# Patient Record
Sex: FEMALE | Race: WHITE | NOT HISPANIC OR LATINO | Employment: OTHER | URBAN - METROPOLITAN AREA
[De-identification: names, ages, dates, MRNs, and addresses within clinical notes are randomized per-mention and may not be internally consistent; named-entity substitution may affect disease eponyms.]

---

## 2017-01-03 ENCOUNTER — APPOINTMENT (OUTPATIENT)
Dept: PHYSICAL THERAPY | Facility: CLINIC | Age: 77
End: 2017-01-03
Payer: MEDICARE

## 2017-01-05 ENCOUNTER — APPOINTMENT (OUTPATIENT)
Dept: PHYSICAL THERAPY | Facility: CLINIC | Age: 77
End: 2017-01-05
Payer: MEDICARE

## 2017-01-10 ENCOUNTER — APPOINTMENT (OUTPATIENT)
Dept: PHYSICAL THERAPY | Facility: CLINIC | Age: 77
End: 2017-01-10
Payer: MEDICARE

## 2017-01-12 ENCOUNTER — APPOINTMENT (OUTPATIENT)
Dept: PHYSICAL THERAPY | Facility: CLINIC | Age: 77
End: 2017-01-12
Payer: MEDICARE

## 2017-01-12 PROCEDURE — 97110 THERAPEUTIC EXERCISES: CPT

## 2017-01-12 PROCEDURE — 97535 SELF CARE MNGMENT TRAINING: CPT

## 2017-01-12 PROCEDURE — 97140 MANUAL THERAPY 1/> REGIONS: CPT

## 2017-01-17 ENCOUNTER — APPOINTMENT (OUTPATIENT)
Dept: PHYSICAL THERAPY | Facility: CLINIC | Age: 77
End: 2017-01-17
Payer: MEDICARE

## 2017-01-17 PROCEDURE — 97014 ELECTRIC STIMULATION THERAPY: CPT

## 2017-01-17 PROCEDURE — 97140 MANUAL THERAPY 1/> REGIONS: CPT

## 2017-01-17 PROCEDURE — 97110 THERAPEUTIC EXERCISES: CPT

## 2017-01-19 ENCOUNTER — APPOINTMENT (OUTPATIENT)
Dept: PHYSICAL THERAPY | Facility: CLINIC | Age: 77
End: 2017-01-19
Payer: MEDICARE

## 2017-01-19 PROCEDURE — 97164 PT RE-EVAL EST PLAN CARE: CPT

## 2017-01-19 PROCEDURE — G8990 OTHER PT/OT CURRENT STATUS: HCPCS

## 2017-01-19 PROCEDURE — G8991 OTHER PT/OT GOAL STATUS: HCPCS

## 2017-01-19 PROCEDURE — 97140 MANUAL THERAPY 1/> REGIONS: CPT

## 2017-01-19 PROCEDURE — 97110 THERAPEUTIC EXERCISES: CPT

## 2017-01-19 PROCEDURE — 97014 ELECTRIC STIMULATION THERAPY: CPT

## 2017-01-24 ENCOUNTER — APPOINTMENT (OUTPATIENT)
Dept: PHYSICAL THERAPY | Facility: CLINIC | Age: 77
End: 2017-01-24
Payer: MEDICARE

## 2017-01-24 PROCEDURE — 97110 THERAPEUTIC EXERCISES: CPT

## 2017-01-24 PROCEDURE — 97140 MANUAL THERAPY 1/> REGIONS: CPT

## 2017-01-26 ENCOUNTER — APPOINTMENT (OUTPATIENT)
Dept: PHYSICAL THERAPY | Facility: CLINIC | Age: 77
End: 2017-01-26
Payer: MEDICARE

## 2017-01-26 PROCEDURE — 97140 MANUAL THERAPY 1/> REGIONS: CPT

## 2017-01-26 PROCEDURE — 97110 THERAPEUTIC EXERCISES: CPT

## 2017-01-31 ENCOUNTER — APPOINTMENT (OUTPATIENT)
Dept: PHYSICAL THERAPY | Facility: CLINIC | Age: 77
End: 2017-01-31
Payer: MEDICARE

## 2017-01-31 PROCEDURE — 97110 THERAPEUTIC EXERCISES: CPT

## 2017-01-31 PROCEDURE — 97140 MANUAL THERAPY 1/> REGIONS: CPT

## 2017-02-02 ENCOUNTER — APPOINTMENT (OUTPATIENT)
Dept: PHYSICAL THERAPY | Facility: CLINIC | Age: 77
End: 2017-02-02
Payer: MEDICARE

## 2017-02-02 PROCEDURE — 97110 THERAPEUTIC EXERCISES: CPT

## 2017-02-02 PROCEDURE — 97140 MANUAL THERAPY 1/> REGIONS: CPT

## 2017-02-02 PROCEDURE — 97014 ELECTRIC STIMULATION THERAPY: CPT

## 2017-02-07 ENCOUNTER — APPOINTMENT (OUTPATIENT)
Dept: PHYSICAL THERAPY | Facility: CLINIC | Age: 77
End: 2017-02-07
Payer: MEDICARE

## 2017-02-07 PROCEDURE — 97140 MANUAL THERAPY 1/> REGIONS: CPT

## 2017-02-07 PROCEDURE — 97014 ELECTRIC STIMULATION THERAPY: CPT

## 2017-02-07 PROCEDURE — 97110 THERAPEUTIC EXERCISES: CPT

## 2017-02-09 ENCOUNTER — APPOINTMENT (OUTPATIENT)
Dept: PHYSICAL THERAPY | Facility: CLINIC | Age: 77
End: 2017-02-09
Payer: MEDICARE

## 2017-02-10 ENCOUNTER — APPOINTMENT (OUTPATIENT)
Dept: PHYSICAL THERAPY | Facility: CLINIC | Age: 77
End: 2017-02-10
Payer: MEDICARE

## 2017-02-10 PROCEDURE — 97014 ELECTRIC STIMULATION THERAPY: CPT

## 2017-02-10 PROCEDURE — 97110 THERAPEUTIC EXERCISES: CPT

## 2017-02-10 PROCEDURE — 97140 MANUAL THERAPY 1/> REGIONS: CPT

## 2017-02-14 ENCOUNTER — APPOINTMENT (OUTPATIENT)
Dept: PHYSICAL THERAPY | Facility: CLINIC | Age: 77
End: 2017-02-14
Payer: MEDICARE

## 2017-02-14 PROCEDURE — 97110 THERAPEUTIC EXERCISES: CPT

## 2017-02-14 PROCEDURE — 97140 MANUAL THERAPY 1/> REGIONS: CPT

## 2017-02-16 ENCOUNTER — APPOINTMENT (OUTPATIENT)
Dept: PHYSICAL THERAPY | Facility: CLINIC | Age: 77
End: 2017-02-16
Payer: MEDICARE

## 2017-02-16 PROCEDURE — 97110 THERAPEUTIC EXERCISES: CPT

## 2017-02-16 PROCEDURE — 97140 MANUAL THERAPY 1/> REGIONS: CPT

## 2017-02-16 PROCEDURE — 97014 ELECTRIC STIMULATION THERAPY: CPT

## 2017-02-21 ENCOUNTER — APPOINTMENT (OUTPATIENT)
Dept: PHYSICAL THERAPY | Facility: CLINIC | Age: 77
End: 2017-02-21
Payer: MEDICARE

## 2017-02-23 ENCOUNTER — APPOINTMENT (OUTPATIENT)
Dept: PHYSICAL THERAPY | Facility: CLINIC | Age: 77
End: 2017-02-23
Payer: MEDICARE

## 2017-02-27 ENCOUNTER — APPOINTMENT (OUTPATIENT)
Dept: PHYSICAL THERAPY | Facility: CLINIC | Age: 77
End: 2017-02-27
Payer: MEDICARE

## 2017-02-27 PROCEDURE — G8991 OTHER PT/OT GOAL STATUS: HCPCS

## 2017-02-27 PROCEDURE — 97535 SELF CARE MNGMENT TRAINING: CPT

## 2017-02-27 PROCEDURE — 97110 THERAPEUTIC EXERCISES: CPT

## 2017-02-27 PROCEDURE — G8990 OTHER PT/OT CURRENT STATUS: HCPCS

## 2017-02-27 PROCEDURE — 97164 PT RE-EVAL EST PLAN CARE: CPT

## 2017-04-15 ENCOUNTER — HOSPITAL ENCOUNTER (OUTPATIENT)
Facility: HOSPITAL | Age: 77
Discharge: NURSING, REHAB OR LONG-TERM CARE | End: 2017-05-03
Attending: PHYSICAL MEDICINE & REHABILITATION | Admitting: PHYSICAL MEDICINE & REHABILITATION

## 2017-04-17 LAB
ALBUMIN SERPL BCP-MCNC: 2.1 G/DL (ref 3.5–5)
ALP SERPL-CCNC: 110 U/L (ref 46–116)
ALT SERPL W P-5'-P-CCNC: 25 U/L (ref 12–78)
ANION GAP SERPL CALCULATED.3IONS-SCNC: 9 MMOL/L (ref 4–13)
AST SERPL W P-5'-P-CCNC: 35 U/L (ref 5–45)
BASOPHILS # BLD AUTO: 0.04 THOUSANDS/ΜL (ref 0–0.1)
BASOPHILS NFR BLD AUTO: 1 % (ref 0–1)
BILIRUB SERPL-MCNC: 0.8 MG/DL (ref 0.2–1)
BUN SERPL-MCNC: 23 MG/DL (ref 5–25)
CALCIUM SERPL-MCNC: 8.2 MG/DL (ref 8.3–10.1)
CHLORIDE SERPL-SCNC: 108 MMOL/L (ref 100–108)
CO2 SERPL-SCNC: 23 MMOL/L (ref 21–32)
CREAT SERPL-MCNC: 1.35 MG/DL (ref 0.6–1.3)
EOSINOPHIL # BLD AUTO: 0.28 THOUSAND/ΜL (ref 0–0.61)
EOSINOPHIL NFR BLD AUTO: 4 % (ref 0–6)
ERYTHROCYTE [DISTWIDTH] IN BLOOD BY AUTOMATED COUNT: 15.5 % (ref 11.6–15.1)
GFR SERPL CREATININE-BSD FRML MDRD: 38.1 ML/MIN/1.73SQ M
GLUCOSE SERPL-MCNC: 105 MG/DL (ref 65–140)
HCT VFR BLD AUTO: 24.2 % (ref 34.8–46.1)
HGB BLD-MCNC: 7.9 G/DL (ref 11.5–15.4)
LYMPHOCYTES # BLD AUTO: 1.29 THOUSANDS/ΜL (ref 0.6–4.47)
LYMPHOCYTES NFR BLD AUTO: 18 % (ref 14–44)
MCH RBC QN AUTO: 31.9 PG (ref 26.8–34.3)
MCHC RBC AUTO-ENTMCNC: 32.6 G/DL (ref 31.4–37.4)
MCV RBC AUTO: 98 FL (ref 82–98)
MONOCYTES # BLD AUTO: 0.6 THOUSAND/ΜL (ref 0.17–1.22)
MONOCYTES NFR BLD AUTO: 8 % (ref 4–12)
NEUTROPHILS # BLD AUTO: 4.95 THOUSANDS/ΜL (ref 1.85–7.62)
NEUTS SEG NFR BLD AUTO: 69 % (ref 43–75)
NRBC BLD AUTO-RTO: 0 /100 WBCS
PLATELET # BLD AUTO: 307 THOUSANDS/UL (ref 149–390)
PMV BLD AUTO: 10.4 FL (ref 8.9–12.7)
POTASSIUM SERPL-SCNC: 3.9 MMOL/L (ref 3.5–5.3)
PREALB SERPL-MCNC: 9.4 MG/DL (ref 18–40)
PROT SERPL-MCNC: 5.3 G/DL (ref 6.4–8.2)
RBC # BLD AUTO: 2.48 MILLION/UL (ref 3.81–5.12)
SODIUM SERPL-SCNC: 140 MMOL/L (ref 136–145)
WBC # BLD AUTO: 7.22 THOUSAND/UL (ref 4.31–10.16)

## 2017-04-17 PROCEDURE — 80053 COMPREHEN METABOLIC PANEL: CPT | Performed by: PHYSICAL MEDICINE & REHABILITATION

## 2017-04-17 PROCEDURE — 85025 COMPLETE CBC W/AUTO DIFF WBC: CPT | Performed by: PHYSICAL MEDICINE & REHABILITATION

## 2017-04-17 PROCEDURE — 84134 ASSAY OF PREALBUMIN: CPT | Performed by: PHYSICAL MEDICINE & REHABILITATION

## 2017-04-18 LAB
BASOPHILS # BLD AUTO: 0.05 THOUSANDS/ΜL (ref 0–0.1)
BASOPHILS NFR BLD AUTO: 1 % (ref 0–1)
EOSINOPHIL # BLD AUTO: 0.34 THOUSAND/ΜL (ref 0–0.61)
EOSINOPHIL NFR BLD AUTO: 4 % (ref 0–6)
ERYTHROCYTE [DISTWIDTH] IN BLOOD BY AUTOMATED COUNT: 15.9 % (ref 11.6–15.1)
HCT VFR BLD AUTO: 27 % (ref 34.8–46.1)
HGB BLD-MCNC: 8.7 G/DL (ref 11.5–15.4)
LYMPHOCYTES # BLD AUTO: 2.09 THOUSANDS/ΜL (ref 0.6–4.47)
LYMPHOCYTES NFR BLD AUTO: 25 % (ref 14–44)
MCH RBC QN AUTO: 31.6 PG (ref 26.8–34.3)
MCHC RBC AUTO-ENTMCNC: 32.2 G/DL (ref 31.4–37.4)
MCV RBC AUTO: 98 FL (ref 82–98)
MONOCYTES # BLD AUTO: 0.56 THOUSAND/ΜL (ref 0.17–1.22)
MONOCYTES NFR BLD AUTO: 7 % (ref 4–12)
NEUTROPHILS # BLD AUTO: 5.25 THOUSANDS/ΜL (ref 1.85–7.62)
NEUTS SEG NFR BLD AUTO: 63 % (ref 43–75)
NRBC BLD AUTO-RTO: 0 /100 WBCS
PLATELET # BLD AUTO: 387 THOUSANDS/UL (ref 149–390)
PMV BLD AUTO: 10.1 FL (ref 8.9–12.7)
RBC # BLD AUTO: 2.75 MILLION/UL (ref 3.81–5.12)
WBC # BLD AUTO: 8.39 THOUSAND/UL (ref 4.31–10.16)

## 2017-04-18 PROCEDURE — 85025 COMPLETE CBC W/AUTO DIFF WBC: CPT | Performed by: NURSE PRACTITIONER

## 2017-04-21 LAB
ANION GAP SERPL CALCULATED.3IONS-SCNC: 9 MMOL/L (ref 4–13)
BASOPHILS # BLD AUTO: 0.04 THOUSANDS/ΜL (ref 0–0.1)
BASOPHILS NFR BLD AUTO: 1 % (ref 0–1)
BUN SERPL-MCNC: 26 MG/DL (ref 5–25)
CALCIUM SERPL-MCNC: 8.2 MG/DL (ref 8.3–10.1)
CHLORIDE SERPL-SCNC: 107 MMOL/L (ref 100–108)
CO2 SERPL-SCNC: 25 MMOL/L (ref 21–32)
CREAT SERPL-MCNC: 1.3 MG/DL (ref 0.6–1.3)
EOSINOPHIL # BLD AUTO: 0.31 THOUSAND/ΜL (ref 0–0.61)
EOSINOPHIL NFR BLD AUTO: 4 % (ref 0–6)
ERYTHROCYTE [DISTWIDTH] IN BLOOD BY AUTOMATED COUNT: 16.4 % (ref 11.6–15.1)
GFR SERPL CREATININE-BSD FRML MDRD: 39.8 ML/MIN/1.73SQ M
GLUCOSE SERPL-MCNC: 98 MG/DL (ref 65–140)
HCT VFR BLD AUTO: 25.4 % (ref 34.8–46.1)
HEMOCCULT STL QL: NEGATIVE
HGB BLD-MCNC: 8 G/DL (ref 11.5–15.4)
LYMPHOCYTES # BLD AUTO: 1.23 THOUSANDS/ΜL (ref 0.6–4.47)
LYMPHOCYTES NFR BLD AUTO: 17 % (ref 14–44)
MCH RBC QN AUTO: 31.3 PG (ref 26.8–34.3)
MCHC RBC AUTO-ENTMCNC: 31.5 G/DL (ref 31.4–37.4)
MCV RBC AUTO: 99 FL (ref 82–98)
MONOCYTES # BLD AUTO: 0.43 THOUSAND/ΜL (ref 0.17–1.22)
MONOCYTES NFR BLD AUTO: 6 % (ref 4–12)
NEUTROPHILS # BLD AUTO: 5.31 THOUSANDS/ΜL (ref 1.85–7.62)
NEUTS SEG NFR BLD AUTO: 72 % (ref 43–75)
NRBC BLD AUTO-RTO: 0 /100 WBCS
PLATELET # BLD AUTO: 468 THOUSANDS/UL (ref 149–390)
PMV BLD AUTO: 9.9 FL (ref 8.9–12.7)
POTASSIUM SERPL-SCNC: 3.9 MMOL/L (ref 3.5–5.3)
RBC # BLD AUTO: 2.56 MILLION/UL (ref 3.81–5.12)
SODIUM SERPL-SCNC: 141 MMOL/L (ref 136–145)
WBC # BLD AUTO: 7.39 THOUSAND/UL (ref 4.31–10.16)

## 2017-04-21 PROCEDURE — 82272 OCCULT BLD FECES 1-3 TESTS: CPT | Performed by: PHYSICAL MEDICINE & REHABILITATION

## 2017-04-21 PROCEDURE — 80048 BASIC METABOLIC PNL TOTAL CA: CPT | Performed by: PHYSICAL MEDICINE & REHABILITATION

## 2017-04-21 PROCEDURE — 85025 COMPLETE CBC W/AUTO DIFF WBC: CPT | Performed by: PHYSICAL MEDICINE & REHABILITATION

## 2017-04-23 LAB — HEMOCCULT STL QL: NEGATIVE

## 2017-04-23 PROCEDURE — 82272 OCCULT BLD FECES 1-3 TESTS: CPT | Performed by: PHYSICAL MEDICINE & REHABILITATION

## 2017-04-24 LAB
ANION GAP SERPL CALCULATED.3IONS-SCNC: 7 MMOL/L (ref 4–13)
BASOPHILS # BLD AUTO: 0.05 THOUSANDS/ΜL (ref 0–0.1)
BASOPHILS NFR BLD AUTO: 1 % (ref 0–1)
BUN SERPL-MCNC: 23 MG/DL (ref 5–25)
CALCIUM SERPL-MCNC: 8.4 MG/DL (ref 8.3–10.1)
CHLORIDE SERPL-SCNC: 108 MMOL/L (ref 100–108)
CO2 SERPL-SCNC: 27 MMOL/L (ref 21–32)
CREAT SERPL-MCNC: 1.32 MG/DL (ref 0.6–1.3)
EOSINOPHIL # BLD AUTO: 0.28 THOUSAND/ΜL (ref 0–0.61)
EOSINOPHIL NFR BLD AUTO: 5 % (ref 0–6)
ERYTHROCYTE [DISTWIDTH] IN BLOOD BY AUTOMATED COUNT: 16.5 % (ref 11.6–15.1)
GFR SERPL CREATININE-BSD FRML MDRD: 39.1 ML/MIN/1.73SQ M
GLUCOSE SERPL-MCNC: 91 MG/DL (ref 65–140)
HCT VFR BLD AUTO: 28 % (ref 34.8–46.1)
HGB BLD-MCNC: 8.8 G/DL (ref 11.5–15.4)
LYMPHOCYTES # BLD AUTO: 1.61 THOUSANDS/ΜL (ref 0.6–4.47)
LYMPHOCYTES NFR BLD AUTO: 27 % (ref 14–44)
MCH RBC QN AUTO: 31.9 PG (ref 26.8–34.3)
MCHC RBC AUTO-ENTMCNC: 31.4 G/DL (ref 31.4–37.4)
MCV RBC AUTO: 101 FL (ref 82–98)
MONOCYTES # BLD AUTO: 0.25 THOUSAND/ΜL (ref 0.17–1.22)
MONOCYTES NFR BLD AUTO: 4 % (ref 4–12)
NEUTROPHILS # BLD AUTO: 3.65 THOUSANDS/ΜL (ref 1.85–7.62)
NEUTS SEG NFR BLD AUTO: 62 % (ref 43–75)
NRBC BLD AUTO-RTO: 0 /100 WBCS
PLATELET # BLD AUTO: 552 THOUSANDS/UL (ref 149–390)
PMV BLD AUTO: 9.7 FL (ref 8.9–12.7)
POTASSIUM SERPL-SCNC: 4.2 MMOL/L (ref 3.5–5.3)
RBC # BLD AUTO: 2.76 MILLION/UL (ref 3.81–5.12)
SODIUM SERPL-SCNC: 142 MMOL/L (ref 136–145)
WBC # BLD AUTO: 5.87 THOUSAND/UL (ref 4.31–10.16)

## 2017-04-24 PROCEDURE — 80048 BASIC METABOLIC PNL TOTAL CA: CPT | Performed by: PHYSICAL MEDICINE & REHABILITATION

## 2017-04-24 PROCEDURE — 85025 COMPLETE CBC W/AUTO DIFF WBC: CPT | Performed by: PHYSICAL MEDICINE & REHABILITATION

## 2017-04-27 LAB
ANION GAP SERPL CALCULATED.3IONS-SCNC: 7 MMOL/L (ref 4–13)
BASOPHILS # BLD AUTO: 0.05 THOUSANDS/ΜL (ref 0–0.1)
BASOPHILS NFR BLD AUTO: 1 % (ref 0–1)
BUN SERPL-MCNC: 20 MG/DL (ref 5–25)
CALCIUM SERPL-MCNC: 8.2 MG/DL (ref 8.3–10.1)
CHLORIDE SERPL-SCNC: 107 MMOL/L (ref 100–108)
CO2 SERPL-SCNC: 28 MMOL/L (ref 21–32)
CREAT SERPL-MCNC: 1.3 MG/DL (ref 0.6–1.3)
EOSINOPHIL # BLD AUTO: 0.27 THOUSAND/ΜL (ref 0–0.61)
EOSINOPHIL NFR BLD AUTO: 5 % (ref 0–6)
ERYTHROCYTE [DISTWIDTH] IN BLOOD BY AUTOMATED COUNT: 16.2 % (ref 11.6–15.1)
GFR SERPL CREATININE-BSD FRML MDRD: 39.8 ML/MIN/1.73SQ M
GLUCOSE SERPL-MCNC: 89 MG/DL (ref 65–140)
HCT VFR BLD AUTO: 27.6 % (ref 34.8–46.1)
HGB BLD-MCNC: 8.6 G/DL (ref 11.5–15.4)
LYMPHOCYTES # BLD AUTO: 1.2 THOUSANDS/ΜL (ref 0.6–4.47)
LYMPHOCYTES NFR BLD AUTO: 22 % (ref 14–44)
MCH RBC QN AUTO: 31.4 PG (ref 26.8–34.3)
MCHC RBC AUTO-ENTMCNC: 31.2 G/DL (ref 31.4–37.4)
MCV RBC AUTO: 101 FL (ref 82–98)
MONOCYTES # BLD AUTO: 0.39 THOUSAND/ΜL (ref 0.17–1.22)
MONOCYTES NFR BLD AUTO: 7 % (ref 4–12)
NEUTROPHILS # BLD AUTO: 3.44 THOUSANDS/ΜL (ref 1.85–7.62)
NEUTS SEG NFR BLD AUTO: 64 % (ref 43–75)
NRBC BLD AUTO-RTO: 0 /100 WBCS
PLATELET # BLD AUTO: 486 THOUSANDS/UL (ref 149–390)
PMV BLD AUTO: 9.8 FL (ref 8.9–12.7)
POTASSIUM SERPL-SCNC: 4.2 MMOL/L (ref 3.5–5.3)
RBC # BLD AUTO: 2.74 MILLION/UL (ref 3.81–5.12)
SODIUM SERPL-SCNC: 142 MMOL/L (ref 136–145)
WBC # BLD AUTO: 5.37 THOUSAND/UL (ref 4.31–10.16)

## 2017-04-27 PROCEDURE — 85025 COMPLETE CBC W/AUTO DIFF WBC: CPT | Performed by: PHYSICAL MEDICINE & REHABILITATION

## 2017-04-27 PROCEDURE — 80048 BASIC METABOLIC PNL TOTAL CA: CPT | Performed by: PHYSICAL MEDICINE & REHABILITATION

## 2017-05-01 LAB
ANION GAP SERPL CALCULATED.3IONS-SCNC: 8 MMOL/L (ref 4–13)
BASOPHILS # BLD AUTO: 0.04 THOUSANDS/ΜL (ref 0–0.1)
BASOPHILS NFR BLD AUTO: 1 % (ref 0–1)
BUN SERPL-MCNC: 21 MG/DL (ref 5–25)
CALCIUM SERPL-MCNC: 8.4 MG/DL (ref 8.3–10.1)
CHLORIDE SERPL-SCNC: 108 MMOL/L (ref 100–108)
CO2 SERPL-SCNC: 27 MMOL/L (ref 21–32)
CREAT SERPL-MCNC: 1.31 MG/DL (ref 0.6–1.3)
EOSINOPHIL # BLD AUTO: 0.23 THOUSAND/ΜL (ref 0–0.61)
EOSINOPHIL NFR BLD AUTO: 4 % (ref 0–6)
ERYTHROCYTE [DISTWIDTH] IN BLOOD BY AUTOMATED COUNT: 15.9 % (ref 11.6–15.1)
GFR SERPL CREATININE-BSD FRML MDRD: 39.5 ML/MIN/1.73SQ M
GLUCOSE P FAST SERPL-MCNC: 98 MG/DL (ref 65–99)
GLUCOSE SERPL-MCNC: 98 MG/DL (ref 65–140)
HCT VFR BLD AUTO: 29.7 % (ref 34.8–46.1)
HGB BLD-MCNC: 9.4 G/DL (ref 11.5–15.4)
LYMPHOCYTES # BLD AUTO: 1.05 THOUSANDS/ΜL (ref 0.6–4.47)
LYMPHOCYTES NFR BLD AUTO: 20 % (ref 14–44)
MCH RBC QN AUTO: 31.6 PG (ref 26.8–34.3)
MCHC RBC AUTO-ENTMCNC: 31.6 G/DL (ref 31.4–37.4)
MCV RBC AUTO: 100 FL (ref 82–98)
MONOCYTES # BLD AUTO: 0.34 THOUSAND/ΜL (ref 0.17–1.22)
MONOCYTES NFR BLD AUTO: 7 % (ref 4–12)
NEUTROPHILS # BLD AUTO: 3.51 THOUSANDS/ΜL (ref 1.85–7.62)
NEUTS SEG NFR BLD AUTO: 68 % (ref 43–75)
NRBC BLD AUTO-RTO: 0 /100 WBCS
PLATELET # BLD AUTO: 372 THOUSANDS/UL (ref 149–390)
PMV BLD AUTO: 9.8 FL (ref 8.9–12.7)
POTASSIUM SERPL-SCNC: 3.8 MMOL/L (ref 3.5–5.3)
RBC # BLD AUTO: 2.97 MILLION/UL (ref 3.81–5.12)
SODIUM SERPL-SCNC: 143 MMOL/L (ref 136–145)
WBC # BLD AUTO: 5.2 THOUSAND/UL (ref 4.31–10.16)

## 2017-05-01 PROCEDURE — 85025 COMPLETE CBC W/AUTO DIFF WBC: CPT | Performed by: INTERNAL MEDICINE

## 2017-05-01 PROCEDURE — 80048 BASIC METABOLIC PNL TOTAL CA: CPT | Performed by: INTERNAL MEDICINE

## 2017-08-31 ENCOUNTER — APPOINTMENT (OUTPATIENT)
Dept: PHYSICAL THERAPY | Facility: CLINIC | Age: 77
End: 2017-08-31
Payer: MEDICARE

## 2017-08-31 PROCEDURE — 97110 THERAPEUTIC EXERCISES: CPT

## 2017-08-31 PROCEDURE — 97161 PT EVAL LOW COMPLEX 20 MIN: CPT

## 2017-08-31 PROCEDURE — G8991 OTHER PT/OT GOAL STATUS: HCPCS

## 2017-08-31 PROCEDURE — G8990 OTHER PT/OT CURRENT STATUS: HCPCS

## 2017-09-05 ENCOUNTER — APPOINTMENT (OUTPATIENT)
Dept: PHYSICAL THERAPY | Facility: CLINIC | Age: 77
End: 2017-09-05
Payer: MEDICARE

## 2017-09-07 ENCOUNTER — APPOINTMENT (OUTPATIENT)
Dept: PHYSICAL THERAPY | Facility: CLINIC | Age: 77
End: 2017-09-07
Payer: MEDICARE

## 2017-09-07 PROCEDURE — 97110 THERAPEUTIC EXERCISES: CPT

## 2017-09-11 ENCOUNTER — APPOINTMENT (OUTPATIENT)
Dept: PHYSICAL THERAPY | Facility: CLINIC | Age: 77
End: 2017-09-11
Payer: MEDICARE

## 2017-09-11 PROCEDURE — 97150 GROUP THERAPEUTIC PROCEDURES: CPT

## 2017-09-11 PROCEDURE — 97140 MANUAL THERAPY 1/> REGIONS: CPT

## 2017-09-18 ENCOUNTER — APPOINTMENT (OUTPATIENT)
Dept: PHYSICAL THERAPY | Facility: CLINIC | Age: 77
End: 2017-09-18
Payer: MEDICARE

## 2017-09-18 PROCEDURE — 97140 MANUAL THERAPY 1/> REGIONS: CPT

## 2017-09-18 PROCEDURE — 97110 THERAPEUTIC EXERCISES: CPT

## 2017-09-21 ENCOUNTER — APPOINTMENT (OUTPATIENT)
Dept: PHYSICAL THERAPY | Facility: CLINIC | Age: 77
End: 2017-09-21
Payer: MEDICARE

## 2017-09-21 PROCEDURE — 97110 THERAPEUTIC EXERCISES: CPT

## 2017-09-21 PROCEDURE — 97140 MANUAL THERAPY 1/> REGIONS: CPT

## 2017-09-26 ENCOUNTER — APPOINTMENT (OUTPATIENT)
Dept: PHYSICAL THERAPY | Facility: CLINIC | Age: 77
End: 2017-09-26
Payer: MEDICARE

## 2017-09-26 PROCEDURE — 97110 THERAPEUTIC EXERCISES: CPT

## 2017-09-26 PROCEDURE — 97140 MANUAL THERAPY 1/> REGIONS: CPT

## 2017-09-28 ENCOUNTER — APPOINTMENT (OUTPATIENT)
Dept: PHYSICAL THERAPY | Facility: CLINIC | Age: 77
End: 2017-09-28
Payer: MEDICARE

## 2017-09-28 PROCEDURE — 97110 THERAPEUTIC EXERCISES: CPT

## 2017-09-28 PROCEDURE — 97140 MANUAL THERAPY 1/> REGIONS: CPT

## 2017-10-05 ENCOUNTER — APPOINTMENT (OUTPATIENT)
Dept: PHYSICAL THERAPY | Facility: CLINIC | Age: 77
End: 2017-10-05
Payer: MEDICARE

## 2017-10-05 PROCEDURE — 97110 THERAPEUTIC EXERCISES: CPT

## 2017-10-05 PROCEDURE — 97140 MANUAL THERAPY 1/> REGIONS: CPT

## 2017-10-12 ENCOUNTER — APPOINTMENT (OUTPATIENT)
Dept: PHYSICAL THERAPY | Facility: CLINIC | Age: 77
End: 2017-10-12
Payer: MEDICARE

## 2017-10-19 ENCOUNTER — APPOINTMENT (OUTPATIENT)
Dept: PHYSICAL THERAPY | Facility: CLINIC | Age: 77
End: 2017-10-19
Payer: MEDICARE

## 2017-10-19 PROCEDURE — G8991 OTHER PT/OT GOAL STATUS: HCPCS

## 2017-10-19 PROCEDURE — 97164 PT RE-EVAL EST PLAN CARE: CPT

## 2017-10-19 PROCEDURE — 97110 THERAPEUTIC EXERCISES: CPT

## 2017-10-19 PROCEDURE — 97140 MANUAL THERAPY 1/> REGIONS: CPT

## 2017-10-19 PROCEDURE — G8990 OTHER PT/OT CURRENT STATUS: HCPCS

## 2017-10-24 ENCOUNTER — APPOINTMENT (OUTPATIENT)
Dept: PHYSICAL THERAPY | Facility: CLINIC | Age: 77
End: 2017-10-24
Payer: MEDICARE

## 2017-10-24 PROCEDURE — 97140 MANUAL THERAPY 1/> REGIONS: CPT

## 2017-10-24 PROCEDURE — 97110 THERAPEUTIC EXERCISES: CPT

## 2017-10-26 ENCOUNTER — APPOINTMENT (OUTPATIENT)
Dept: PHYSICAL THERAPY | Facility: CLINIC | Age: 77
End: 2017-10-26
Payer: MEDICARE

## 2017-10-26 PROCEDURE — 97140 MANUAL THERAPY 1/> REGIONS: CPT

## 2017-10-26 PROCEDURE — 97110 THERAPEUTIC EXERCISES: CPT

## 2019-05-02 ENCOUNTER — APPOINTMENT (OUTPATIENT)
Dept: RADIOLOGY | Facility: CLINIC | Age: 79
End: 2019-05-02
Payer: MEDICARE

## 2019-05-02 PROCEDURE — 72100 X-RAY EXAM L-S SPINE 2/3 VWS: CPT

## 2019-05-02 PROCEDURE — 72040 X-RAY EXAM NECK SPINE 2-3 VW: CPT

## 2019-05-02 PROCEDURE — 73060 X-RAY EXAM OF HUMERUS: CPT | Mod: RT

## 2019-08-01 ENCOUNTER — APPOINTMENT (OUTPATIENT)
Dept: RADIOLOGY | Facility: CLINIC | Age: 79
End: 2019-08-01
Payer: MEDICARE

## 2019-08-01 PROCEDURE — 73030 X-RAY EXAM OF SHOULDER: CPT | Mod: RT

## 2019-08-23 ENCOUNTER — APPOINTMENT (OUTPATIENT)
Dept: ULTRASOUND IMAGING | Facility: CLINIC | Age: 79
End: 2019-08-23
Payer: MEDICARE

## 2019-08-23 PROCEDURE — 76641 ULTRASOUND BREAST COMPLETE: CPT | Mod: LT

## 2020-07-04 ENCOUNTER — EMERGENCY (EMERGENCY)
Facility: HOSPITAL | Age: 80
LOS: 1 days | End: 2020-07-04
Admitting: EMERGENCY MEDICINE
Payer: MEDICARE

## 2020-07-04 PROCEDURE — 99283 EMERGENCY DEPT VISIT LOW MDM: CPT

## 2020-07-04 PROCEDURE — 71046 X-RAY EXAM CHEST 2 VIEWS: CPT | Mod: 26

## 2020-08-07 ENCOUNTER — APPOINTMENT (OUTPATIENT)
Dept: RADIOLOGY | Facility: CLINIC | Age: 80
End: 2020-08-07

## 2021-06-29 ENCOUNTER — APPOINTMENT (OUTPATIENT)
Dept: OPHTHALMOLOGY | Facility: CLINIC | Age: 81
End: 2021-06-29

## 2021-08-31 ENCOUNTER — INPATIENT (INPATIENT)
Facility: HOSPITAL | Age: 81
LOS: 2 days | Discharge: NURSING, REHAB OR LONG-TERM CARE | End: 2021-09-03
Attending: STUDENT IN AN ORGANIZED HEALTH CARE EDUCATION/TRAINING PROGRAM

## 2021-08-31 ENCOUNTER — OUTPATIENT (OUTPATIENT)
Dept: OUTPATIENT SERVICES | Facility: HOSPITAL | Age: 81
LOS: 1 days | End: 2021-08-31

## 2021-09-01 ENCOUNTER — OUTPATIENT (OUTPATIENT)
Dept: OUTPATIENT SERVICES | Facility: HOSPITAL | Age: 81
LOS: 1 days | End: 2021-09-01

## 2021-09-02 ENCOUNTER — OUTPATIENT (OUTPATIENT)
Dept: OUTPATIENT SERVICES | Facility: HOSPITAL | Age: 81
LOS: 1 days | End: 2021-09-02

## 2021-09-03 ENCOUNTER — INPATIENT (INPATIENT)
Facility: HOSPITAL | Age: 81
LOS: 25 days | Discharge: HOME | End: 2021-09-29
Attending: INTERNAL MEDICINE | Admitting: INTERNAL MEDICINE

## 2021-09-03 ENCOUNTER — OUTPATIENT (OUTPATIENT)
Dept: OUTPATIENT SERVICES | Facility: HOSPITAL | Age: 81
LOS: 1 days | End: 2021-09-03

## 2021-09-04 ENCOUNTER — OUTPATIENT (OUTPATIENT)
Dept: OUTPATIENT SERVICES | Facility: HOSPITAL | Age: 81
LOS: 1 days | End: 2021-09-04

## 2021-09-08 ENCOUNTER — OUTPATIENT (OUTPATIENT)
Dept: OUTPATIENT SERVICES | Facility: HOSPITAL | Age: 81
LOS: 1 days | End: 2021-09-08

## 2022-01-21 ENCOUNTER — TRANSCRIPTION ENCOUNTER (OUTPATIENT)
Age: 82
End: 2022-01-21

## 2022-04-28 ENCOUNTER — APPOINTMENT (OUTPATIENT)
Dept: ORTHOPEDIC SURGERY | Facility: CLINIC | Age: 82
End: 2022-04-28
Payer: MEDICARE

## 2022-04-28 PROCEDURE — 20611 DRAIN/INJ JOINT/BURSA W/US: CPT | Mod: 50

## 2022-04-28 NOTE — PROCEDURE
[Large Joint Injection] : Large joint injection [Bilateral] : bilaterally of the [Knee] : knee [Pain] : pain [Inflammation] : inflammation [X-ray evidence of Osteoarthritis on this or prior visit] : x-ray evidence of Osteoarthritis on this or prior visit [Alcohol] : alcohol [Betadine] : betadine [Ethyl Chloride sprayed topically] : ethyl chloride sprayed topically [Sterile technique used] : sterile technique used [Orthovisc] : Orthovisc [#1] : series #1 [] : Patient tolerated procedure well [Call if redness, pain or fever occur] : call if redness, pain or fever occur [Apply ice for 15min out of every hour for the next 12-24 hours as tolerated] : apply ice for 15 minutes out of every hour for the next 12-24 hours as tolerated [Previous OTC use and PT nontherapeutic] : patient has tried OTC's including aspirin, Ibuprofen, Aleve, etc or prescription NSAIDS, and/or exercises at home and/or physical therapy without satisfactory response [Patient had decreased mobility in the joint] : patient had decreased mobility in the joint [Risks, benefits, alternatives discussed / Verbal consent obtained] : the risks benefits, and alternatives have been discussed, and verbal consent was obtained [Altered anatomic landmarks d/t erosive arthritis] : altered anatomic landmarks d/t erosive arthritis [All ultrasound images have been permanently captured and stored accordingly in our picture archiving and communication system] : All ultrasound images have been permanently captured and stored accordingly in our picture archiving and communication system [Visualization of the needle and placement of injection was performed without complication] : visualization of the needle and placement of injection was performed without complication

## 2022-05-05 ENCOUNTER — APPOINTMENT (OUTPATIENT)
Dept: ORTHOPEDIC SURGERY | Facility: CLINIC | Age: 82
End: 2022-05-05
Payer: MEDICARE

## 2022-05-05 PROCEDURE — 20611 DRAIN/INJ JOINT/BURSA W/US: CPT | Mod: 50

## 2022-05-05 NOTE — PROCEDURE
[Large Joint Injection] : Large joint injection [Bilateral] : bilaterally of the [Knee] : knee [Pain] : pain [Inflammation] : inflammation [X-ray evidence of Osteoarthritis on this or prior visit] : x-ray evidence of Osteoarthritis on this or prior visit [Alcohol] : alcohol [Betadine] : betadine [Ethyl Chloride sprayed topically] : ethyl chloride sprayed topically [Sterile technique used] : sterile technique used [Orthovisc] : Orthovisc [] : Patient tolerated procedure well [Call if redness, pain or fever occur] : call if redness, pain or fever occur [Apply ice for 15min out of every hour for the next 12-24 hours as tolerated] : apply ice for 15 minutes out of every hour for the next 12-24 hours as tolerated [Previous OTC use and PT nontherapeutic] : patient has tried OTC's including aspirin, Ibuprofen, Aleve, etc or prescription NSAIDS, and/or exercises at home and/or physical therapy without satisfactory response [Patient had decreased mobility in the joint] : patient had decreased mobility in the joint [Risks, benefits, alternatives discussed / Verbal consent obtained] : the risks benefits, and alternatives have been discussed, and verbal consent was obtained [Altered anatomic landmarks d/t erosive arthritis] : altered anatomic landmarks d/t erosive arthritis [All ultrasound images have been permanently captured and stored accordingly in our picture archiving and communication system] : All ultrasound images have been permanently captured and stored accordingly in our picture archiving and communication system [Visualization of the needle and placement of injection was performed without complication] : visualization of the needle and placement of injection was performed without complication [#3] : series #3

## 2022-06-02 ENCOUNTER — APPOINTMENT (OUTPATIENT)
Dept: ORTHOPEDIC SURGERY | Facility: CLINIC | Age: 82
End: 2022-06-02

## 2022-06-09 ENCOUNTER — APPOINTMENT (OUTPATIENT)
Dept: ORTHOPEDIC SURGERY | Facility: CLINIC | Age: 82
End: 2022-06-09

## 2022-09-05 ENCOUNTER — OUTPATIENT (OUTPATIENT)
Dept: OUTPATIENT SERVICES | Facility: HOSPITAL | Age: 82
LOS: 1 days | End: 2022-09-05

## 2022-09-05 ENCOUNTER — INPATIENT (INPATIENT)
Facility: HOSPITAL | Age: 82
LOS: 2 days | Discharge: NURSING, REHAB OR LONG-TERM CARE | End: 2022-09-08
Attending: STUDENT IN AN ORGANIZED HEALTH CARE EDUCATION/TRAINING PROGRAM

## 2022-09-05 PROCEDURE — 72192 CT PELVIS W/O DYE: CPT | Mod: 26,MA

## 2022-09-05 PROCEDURE — 99285 EMERGENCY DEPT VISIT HI MDM: CPT

## 2022-09-05 PROCEDURE — 93970 EXTREMITY STUDY: CPT | Mod: 26

## 2022-09-05 PROCEDURE — 93010 ELECTROCARDIOGRAM REPORT: CPT

## 2022-09-05 PROCEDURE — 72131 CT LUMBAR SPINE W/O DYE: CPT | Mod: 26,MA

## 2022-09-06 ENCOUNTER — OUTPATIENT (OUTPATIENT)
Dept: OUTPATIENT SERVICES | Facility: HOSPITAL | Age: 82
LOS: 1 days | End: 2022-09-06

## 2022-09-06 PROCEDURE — 76775 US EXAM ABDO BACK WALL LIM: CPT | Mod: 26

## 2022-09-07 ENCOUNTER — OUTPATIENT (OUTPATIENT)
Dept: OUTPATIENT SERVICES | Facility: HOSPITAL | Age: 82
LOS: 1 days | End: 2022-09-07

## 2022-09-08 ENCOUNTER — OUTPATIENT (OUTPATIENT)
Dept: OUTPATIENT SERVICES | Facility: HOSPITAL | Age: 82
LOS: 1 days | End: 2022-09-08

## 2022-09-09 ENCOUNTER — OUTPATIENT (OUTPATIENT)
Dept: OUTPATIENT SERVICES | Facility: HOSPITAL | Age: 82
LOS: 1 days | End: 2022-09-09

## 2022-09-09 DIAGNOSIS — Z29.9 ENCOUNTER FOR PROPHYLACTIC MEASURES, UNSPECIFIED: ICD-10-CM

## 2022-09-12 ENCOUNTER — OUTPATIENT (OUTPATIENT)
Dept: OUTPATIENT SERVICES | Facility: HOSPITAL | Age: 82
LOS: 1 days | End: 2022-09-12

## 2022-09-12 DIAGNOSIS — R11.2 NAUSEA WITH VOMITING, UNSPECIFIED: ICD-10-CM
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
## 2022-09-14 DIAGNOSIS — M54.9 DORSALGIA, UNSPECIFIED: ICD-10-CM

## 2022-09-14 DIAGNOSIS — S72.112A DISPLACED FRACTURE OF GREATER TROCHANTER OF LEFT FEMUR, INITIAL ENCOUNTER FOR CLOSED FRACTURE: ICD-10-CM
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
## 2022-09-14 DIAGNOSIS — N18.30 CHRONIC KIDNEY DISEASE, STAGE 3 UNSPECIFIED: ICD-10-CM

## 2022-09-14 DIAGNOSIS — M48.061 SPINAL STENOSIS, LUMBAR REGION WITHOUT NEUROGENIC CLAUDICATION: ICD-10-CM
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
## 2022-09-14 DIAGNOSIS — N17.9 ACUTE KIDNEY FAILURE, UNSPECIFIED: ICD-10-CM
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
## 2022-09-14 DIAGNOSIS — R26.81 UNSTEADINESS ON FEET: ICD-10-CM

## 2022-09-14 DIAGNOSIS — R60.0 LOCALIZED EDEMA: ICD-10-CM
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
## 2022-09-14 DIAGNOSIS — E66.01 MORBID (SEVERE) OBESITY DUE TO EXCESS CALORIES: ICD-10-CM

## 2022-09-14 DIAGNOSIS — Z90.13 ACQUIRED ABSENCE OF BILATERAL BREASTS AND NIPPLES: ICD-10-CM

## 2022-09-14 DIAGNOSIS — M06.9 RHEUMATOID ARTHRITIS, UNSPECIFIED: ICD-10-CM

## 2022-09-14 DIAGNOSIS — Z79.01 LONG TERM (CURRENT) USE OF ANTICOAGULANTS: ICD-10-CM

## 2022-09-14 DIAGNOSIS — Z83.3 FAMILY HISTORY OF DIABETES MELLITUS: ICD-10-CM

## 2022-09-14 DIAGNOSIS — Z85.3 PERSONAL HISTORY OF MALIGNANT NEOPLASM OF BREAST: ICD-10-CM

## 2022-09-14 DIAGNOSIS — I48.20 CHRONIC ATRIAL FIBRILLATION, UNSPECIFIED: ICD-10-CM

## 2022-09-14 DIAGNOSIS — M97.02XA PERIPROSTHETIC FRACTURE AROUND INTERNAL PROSTHETIC LEFT HIP JOINT, INITIAL ENCOUNTER: ICD-10-CM

## 2022-09-14 DIAGNOSIS — Z20.822 CONTACT WITH AND (SUSPECTED) EXPOSURE TO COVID-19: ICD-10-CM
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
## 2022-10-03 DIAGNOSIS — I48.91 UNSPECIFIED ATRIAL FIBRILLATION: ICD-10-CM

## 2022-10-03 DIAGNOSIS — N18.9 CHRONIC KIDNEY DISEASE, UNSPECIFIED: ICD-10-CM

## 2022-10-03 DIAGNOSIS — R26.81 UNSTEADINESS ON FEET: ICD-10-CM
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
## 2022-10-11 DIAGNOSIS — I48.91 UNSPECIFIED ATRIAL FIBRILLATION: ICD-10-CM

## 2022-10-11 DIAGNOSIS — N18.9 CHRONIC KIDNEY DISEASE, UNSPECIFIED: ICD-10-CM

## 2022-10-11 DIAGNOSIS — R26.81 UNSTEADINESS ON FEET: ICD-10-CM
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
## 2022-10-24 DIAGNOSIS — R26.81 UNSTEADINESS ON FEET: ICD-10-CM

## 2022-10-24 DIAGNOSIS — I48.91 UNSPECIFIED ATRIAL FIBRILLATION: ICD-10-CM

## 2022-10-24 DIAGNOSIS — N18.9 CHRONIC KIDNEY DISEASE, UNSPECIFIED: ICD-10-CM

## 2022-11-22 ENCOUNTER — APPOINTMENT (OUTPATIENT)
Dept: CARDIOLOGY | Facility: CLINIC | Age: 82
End: 2022-11-22

## 2022-11-22 ENCOUNTER — NON-APPOINTMENT (OUTPATIENT)
Age: 82
End: 2022-11-22

## 2022-11-22 ENCOUNTER — APPOINTMENT (OUTPATIENT)
Dept: CARDIOLOGY | Facility: CLINIC | Age: 82
End: 2022-11-22
Payer: MEDICARE

## 2022-11-22 VITALS
HEART RATE: 105 BPM | BODY MASS INDEX: 40.91 KG/M2 | SYSTOLIC BLOOD PRESSURE: 138 MMHG | HEIGHT: 62.5 IN | WEIGHT: 228 LBS | DIASTOLIC BLOOD PRESSURE: 84 MMHG | OXYGEN SATURATION: 97 %

## 2022-11-22 VITALS — SYSTOLIC BLOOD PRESSURE: 130 MMHG | DIASTOLIC BLOOD PRESSURE: 82 MMHG

## 2022-11-22 DIAGNOSIS — M81.0 AGE-RELATED OSTEOPOROSIS W/OUT CURRENT PATHOLOGICAL FRACTURE: ICD-10-CM

## 2022-11-22 DIAGNOSIS — Z72.3 LACK OF PHYSICAL EXERCISE: ICD-10-CM

## 2022-11-22 DIAGNOSIS — Z78.9 OTHER SPECIFIED HEALTH STATUS: ICD-10-CM

## 2022-11-22 DIAGNOSIS — Z85.3 PERSONAL HISTORY OF MALIGNANT NEOPLASM OF BREAST: ICD-10-CM

## 2022-11-22 DIAGNOSIS — Z87.891 PERSONAL HISTORY OF NICOTINE DEPENDENCE: ICD-10-CM

## 2022-11-22 DIAGNOSIS — Z83.3 FAMILY HISTORY OF DIABETES MELLITUS: ICD-10-CM

## 2022-11-22 PROCEDURE — 93000 ELECTROCARDIOGRAM COMPLETE: CPT

## 2022-11-22 PROCEDURE — 93306 TTE W/DOPPLER COMPLETE: CPT

## 2022-11-22 PROCEDURE — 99205 OFFICE O/P NEW HI 60 MIN: CPT

## 2022-11-22 RX ORDER — SULFAMETHOXAZOLE AND TRIMETHOPRIM 800; 160 MG/1; MG/1
800-160 TABLET ORAL
Qty: 6 | Refills: 0 | Status: DISCONTINUED | COMMUNITY
Start: 2022-05-25 | End: 2022-11-22

## 2022-11-22 RX ORDER — OFLOXACIN OTIC 3 MG/ML
0.3 SOLUTION AURICULAR (OTIC)
Qty: 10 | Refills: 0 | Status: DISCONTINUED | COMMUNITY
Start: 2022-05-26 | End: 2022-11-22

## 2022-11-22 RX ORDER — NYSTATIN 100000 1/G
100000 POWDER TOPICAL
Qty: 60 | Refills: 0 | Status: DISCONTINUED | COMMUNITY
Start: 2022-05-23 | End: 2022-11-22

## 2022-11-22 RX ORDER — TRAMADOL HYDROCHLORIDE 50 MG/1
50 TABLET, COATED ORAL
Refills: 0 | Status: ACTIVE | COMMUNITY
Start: 2022-11-22

## 2022-11-22 RX ORDER — DENOSUMAB 60 MG/ML
60 INJECTION SUBCUTANEOUS
Qty: 1 | Refills: 0 | Status: DISCONTINUED | COMMUNITY
Start: 2022-08-11 | End: 2022-11-22

## 2022-11-22 NOTE — REASON FOR VISIT
[Cardiac Failure] : cardiac failure [Arrhythmia/ECG Abnorrmalities] : arrhythmia/ECG abnormalities [FreeTextEntry3] : Dr. Riddle [FreeTextEntry1] : I saw this 82-year-old woman in cardiac consultation on  11/22/22\par Last medical problem was 20 years ago when she had bilateral mastectomy.  She has had atrial fibrillation with anticoagulation for more than 10 years.  She is wheelchair-bound.  She now has 2+ pedal edema bilaterally which despite Lasix 40 mg daily has not dissipated.  She has mild increase in her shortness of breath.

## 2022-11-22 NOTE — HISTORY OF PRESENT ILLNESS
[FreeTextEntry1] : She has no chest pain\par She has mild shortness of breath\par She has no palpitations\par She has no syncope\par She is neurologically intact\par She has 2+ bilateral edema\par She has no skin rashes\par

## 2022-11-22 NOTE — DISCUSSION/SUMMARY
[FreeTextEntry1] : 1) Continue Xarelto\par 2) Increase lasix to 80mg alt. with 40mg\par 3) Cardiac echo to evaluate murmurs and LV function as well as right sided pressures

## 2022-12-06 ENCOUNTER — RX CHANGE (OUTPATIENT)
Age: 82
End: 2022-12-06

## 2022-12-06 ENCOUNTER — APPOINTMENT (OUTPATIENT)
Dept: CARDIOLOGY | Facility: CLINIC | Age: 82
End: 2022-12-06

## 2022-12-06 VITALS
HEART RATE: 54 BPM | DIASTOLIC BLOOD PRESSURE: 70 MMHG | WEIGHT: 223 LBS | OXYGEN SATURATION: 99 % | TEMPERATURE: 97.1 F | BODY MASS INDEX: 40.01 KG/M2 | HEIGHT: 62.5 IN | SYSTOLIC BLOOD PRESSURE: 124 MMHG

## 2022-12-06 PROCEDURE — 99214 OFFICE O/P EST MOD 30 MIN: CPT

## 2022-12-12 NOTE — PHYSICAL EXAM
[Well Developed] : well developed [Well Nourished] : well nourished [No Acute Distress] : no acute distress [Normal Venous Pressure] : normal venous pressure [No Carotid Bruit] : no carotid bruit [No Rub] : no rub [No Gallop] : no gallop [Clear Lung Fields] : clear lung fields [No Skin Lesions] : no skin lesions [Moves all extremities] : moves all extremities [Normal] : alert and oriented, normal memory [Alert and Oriented] : alert and oriented [de-identified] : Afib. 2-3/6 DARY [de-identified] : 2-3+ BLE pitting edema [de-identified] : ALEIDA ceja

## 2022-12-12 NOTE — REASON FOR VISIT
[Cardiac Failure] : cardiac failure [Arrhythmia/ECG Abnorrmalities] : arrhythmia/ECG abnormalities [FreeTextEntry3] : Dr. Riddle [FreeTextEntry1] : Cookie Williamson is an 82 y.o female with PMH of Khoi Mastectomy 20 yrs ago, Aortic Stenosis, Afib on Xarelto\par \par She was seen for an initial consult on 11/22/2022. Previously was followed by another cardiology group.\par Today she presents for ongoing BLE 2+ pedal edema and SOB. She is down 5 lbs since last OV. Her Lasix was increased to 40mg alt 80mg. She feels that over the past 2 months her leg swelling has been getting worse, and over the past month she has noticed an increase in her SOB. She feels MOORE with talking, walking, and when she is tossing and turning in bed. She is in a W/C today in office. \par \par She denies chest pain, pressure, palpitations, lightheadedness, dizziness, near syncope or syncope. \par

## 2022-12-12 NOTE — DISCUSSION/SUMMARY
[FreeTextEntry1] : STEFANI PEÑA is a 82 year old F who presents today with the above history and the following active issues: \par \par BLE Edema/SOB:\par - add Metolazone x 3 days 30 min prior to Lasix. Cont Lasix 40mg/80mg MWF\par -BMP labs to be set up for home draw\par Previous Creat 1.4 on 9/22\par \par AFIB\par -Continue Xarelto\par \par Aortic Stenosis \par -mod-severe on echo. Aortic valve area 1.16\par - will request records from previous cardiology\par \par Follow up with Dr Deshpande next week- pt will be having transportation issues next week for a couple of months. Will try to set up visiting nurse services for BP checks. \par \par F/U after testing to review results.\par Discussed red flag symptoms, which would warrant sooner or emergent medical evaluation.\par Any questions and concerns were addressed and resolved. \par \par Sincerely,\par \par Makenna Forde ANP-C\par Patients history, testing, and plan reviewed with supervising MD: Dr. Magnus Ceja MD, [facility], [facility]

## 2022-12-12 NOTE — CARDIOLOGY SUMMARY
[de-identified] : 11/22/2022: EF 50-55% Afib with diastolic dysfunction. RV appears enlarged with reduced systolic function. Mod-severe AS. Mod AR.  Valve area 1.16. Mod MR. Pulm Pressures 40mmHG. mild WA.

## 2022-12-13 ENCOUNTER — APPOINTMENT (OUTPATIENT)
Dept: CARDIOLOGY | Facility: CLINIC | Age: 82
End: 2022-12-13

## 2022-12-27 ENCOUNTER — NON-APPOINTMENT (OUTPATIENT)
Age: 82
End: 2022-12-27

## 2023-01-05 ENCOUNTER — NON-APPOINTMENT (OUTPATIENT)
Age: 83
End: 2023-01-05

## 2023-01-30 ENCOUNTER — NON-APPOINTMENT (OUTPATIENT)
Age: 83
End: 2023-01-30

## 2023-02-01 ENCOUNTER — NON-APPOINTMENT (OUTPATIENT)
Age: 83
End: 2023-02-01

## 2023-02-03 ENCOUNTER — APPOINTMENT (OUTPATIENT)
Dept: CARDIOLOGY | Facility: CLINIC | Age: 83
End: 2023-02-03
Payer: MEDICARE

## 2023-02-03 VITALS
DIASTOLIC BLOOD PRESSURE: 78 MMHG | OXYGEN SATURATION: 98 % | BODY MASS INDEX: 40.37 KG/M2 | HEART RATE: 51 BPM | TEMPERATURE: 96.9 F | HEIGHT: 62.5 IN | SYSTOLIC BLOOD PRESSURE: 126 MMHG | WEIGHT: 225 LBS

## 2023-02-03 PROCEDURE — 99215 OFFICE O/P EST HI 40 MIN: CPT

## 2023-02-03 NOTE — DISCUSSION/SUMMARY
[FreeTextEntry1] : STEFANI PEÑA is a 83 year old F who presents today with the above history and the following active issues: \par \par BLE Edema/SOB:\par Not improved with Lasix 40 mg daily.  Denies PND orthopnea.  Notes 1 episode of atypical chest pain. Denies missing any doses of her Xarelto.  Low risk of PE.  Will need repeat renal evaluation with history of hyperkalemia.  She is being sent to ED for further evaluation.\par \par \par \par AFIB\par -Continue Xarelto\par \par Aortic Stenosis \par -mod-severe on echo. Aortic valve area 1.16\par \par Right lower extremity foot pain:\par Questionable ischemic origin.  Will need arterial scans.  Patient has no transportation.  Will be transported emergently to ED for further evaluation.

## 2023-02-03 NOTE — REASON FOR VISIT
[Cardiac Failure] : cardiac failure [Arrhythmia/ECG Abnorrmalities] : arrhythmia/ECG abnormalities [FreeTextEntry3] : Dr. Riddle [FreeTextEntry1] : Cookie Williamson is an 82 y.o female with PMH of Khoi Mastectomy 20 yrs ago, Aortic Stenosis, Afib on Xarelto.\par \par Presents today with complaints of bilateral lower extremity edema despite diuretic use.  There is dyspnea on exertion.  No PND nor orthopnea.  Admits to significant bilateral lower extremity edema.  Also endorses lower extremity pain right greater than left with minimal walking.  Described as a burning sensation.  Physical exam reveals +3 lower extremity edema.  Unable to palpate pedal pulses due to edema.  Toes on right foot are dark and cold.  Small area of ecchymosis on the dorsum of right foot.  Painful to palpation.  States this has been ongoing for 1-1/2 weeks.\par \par Patient states she has not missed any of her doses of Xarelto.\par \par She admits to 1 episode of chest pain following a leg cramp that she had.  Resolved on its own in a couple of minutes.\par \par She denies palpitations, lightheadedness, dizziness, near syncope or syncope.

## 2023-02-03 NOTE — CARDIOLOGY SUMMARY
[de-identified] : 11/22/2022: EF 50-55% Afib with diastolic dysfunction. RV appears enlarged with reduced systolic function. Mod-severe AS. Mod AR.  Valve area 1.16. Mod MR. Pulm Pressures 40mmHG. mild HI.

## 2023-02-03 NOTE — PHYSICAL EXAM
[Well Developed] : well developed [Well Nourished] : well nourished [No Acute Distress] : no acute distress [No Rub] : no rub [No Gallop] : no gallop [Clear Lung Fields] : clear lung fields [No Skin Lesions] : no skin lesions [Moves all extremities] : moves all extremities [Alert and Oriented] : alert and oriented [de-identified] : Afib. 2-3/6 DARY [de-identified] : Walks with walker. [de-identified] : 2-3+ BLE pitting edema.  Ecchymosis dorsum of rt foot.  Toes dark ? ecchymotic.

## 2023-02-09 ENCOUNTER — NON-APPOINTMENT (OUTPATIENT)
Age: 83
End: 2023-02-09

## 2023-02-21 ENCOUNTER — APPOINTMENT (OUTPATIENT)
Dept: CARDIOLOGY | Facility: CLINIC | Age: 83
End: 2023-02-21

## 2023-03-06 ENCOUNTER — NON-APPOINTMENT (OUTPATIENT)
Age: 83
End: 2023-03-06

## 2023-03-09 ENCOUNTER — APPOINTMENT (OUTPATIENT)
Dept: CARDIOLOGY | Facility: CLINIC | Age: 83
End: 2023-03-09
Payer: MEDICARE

## 2023-03-09 VITALS
HEART RATE: 76 BPM | WEIGHT: 225 LBS | DIASTOLIC BLOOD PRESSURE: 62 MMHG | SYSTOLIC BLOOD PRESSURE: 100 MMHG | OXYGEN SATURATION: 96 % | HEIGHT: 62 IN | TEMPERATURE: 97 F | BODY MASS INDEX: 41.41 KG/M2

## 2023-03-09 PROCEDURE — 99215 OFFICE O/P EST HI 40 MIN: CPT

## 2023-03-09 NOTE — PHYSICAL EXAM
[Well Developed] : well developed [Well Nourished] : well nourished [No Acute Distress] : no acute distress [Normal Conjunctiva] : normal conjunctiva [Normal Venous Pressure] : normal venous pressure [No Carotid Bruit] : no carotid bruit [No Rub] : no rub [No Gallop] : no gallop [Clear Lung Fields] : clear lung fields [Good Air Entry] : good air entry [No Respiratory Distress] : no respiratory distress  [Soft] : abdomen soft [Non Tender] : non-tender [No Masses/organomegaly] : no masses/organomegaly [Normal Bowel Sounds] : normal bowel sounds [Normal Gait] : normal gait [No Edema] : no edema [No Cyanosis] : no cyanosis [No Clubbing] : no clubbing [No Varicosities] : no varicosities [No Rash] : no rash [No Skin Lesions] : no skin lesions [Moves all extremities] : moves all extremities [No Focal Deficits] : no focal deficits [Normal Speech] : normal speech [Normal] : alert and oriented, normal memory [Alert and Oriented] : alert and oriented [Normal memory] : normal memory [de-identified] : Afib. 2-3/6 DARY

## 2023-03-09 NOTE — REASON FOR VISIT
[Cardiac Failure] : cardiac failure [Arrhythmia/ECG Abnorrmalities] : arrhythmia/ECG abnormalities [FreeTextEntry3] : Dr. Riddle [FreeTextEntry1] : I saw this 83-year-old woman in f/u cardiac consultation on  03/09/23\par Last medical problem was 20 years ago when she had bilateral mastectomy.  She has had atrial fibrillation with anticoagulation for more than 10 years.  She is wheelchair-bound.  She now has 2+ pedal edema bilaterally which despite Lasix 40 mg daily has not dissipated.  She has moderate  increase in her shortness of breath.\par Her echo showed moderate to severe low gradient aortic stenosis and mild to moderate aortic insufficiency.  There was also moderate mitral regurgitation\par She recently had cardiac cath which revealed nonobstructive coronary disease and normal right-sided pressures\par She continues to become breathless on minimal to moderate activity

## 2023-03-09 NOTE — HISTORY OF PRESENT ILLNESS
[FreeTextEntry1] : She has no chest pain\par She has mild to moderate  shortness of breath\par She has no palpitations\par She has no syncope\par She is neurologically intact\par She has 2+ bilateral edema\par She has no skin rashes\par

## 2023-03-09 NOTE — DISCUSSION/SUMMARY
[FreeTextEntry1] : 1) Continue Xarelto\par 2) Increase lasix to with 40mg daily\par 3) she will try and manage for now but if symptoms get worse she will come back sooner otherwise I will see her again in 2 to 3 months.

## 2023-03-10 RX ORDER — METOLAZONE 2.5 MG/1
2.5 TABLET ORAL
Qty: 20 | Refills: 0 | Status: DISCONTINUED | COMMUNITY
Start: 2022-12-06 | End: 2023-03-10

## 2023-03-29 ENCOUNTER — NON-APPOINTMENT (OUTPATIENT)
Age: 83
End: 2023-03-29

## 2023-03-29 RX ORDER — ATORVASTATIN CALCIUM 40 MG/1
40 TABLET, FILM COATED ORAL
Qty: 90 | Refills: 3 | Status: ACTIVE | COMMUNITY
Start: 2023-03-29

## 2023-03-31 ENCOUNTER — NON-APPOINTMENT (OUTPATIENT)
Age: 83
End: 2023-03-31

## 2023-04-13 ENCOUNTER — APPOINTMENT (OUTPATIENT)
Dept: CARDIOLOGY | Facility: CLINIC | Age: 83
End: 2023-04-13
Payer: MEDICARE

## 2023-04-13 VITALS
HEIGHT: 62 IN | OXYGEN SATURATION: 99 % | HEART RATE: 87 BPM | DIASTOLIC BLOOD PRESSURE: 72 MMHG | WEIGHT: 223 LBS | BODY MASS INDEX: 41.04 KG/M2 | SYSTOLIC BLOOD PRESSURE: 126 MMHG

## 2023-04-13 DIAGNOSIS — M17.0 BILATERAL PRIMARY OSTEOARTHRITIS OF KNEE: ICD-10-CM

## 2023-04-13 PROCEDURE — 99215 OFFICE O/P EST HI 40 MIN: CPT

## 2023-04-13 NOTE — REASON FOR VISIT
[Cardiac Failure] : cardiac failure [Arrhythmia/ECG Abnorrmalities] : arrhythmia/ECG abnormalities [FreeTextEntry3] : Dr. Riddle [FreeTextEntry1] : I saw this 83-year-old woman in f/u cardiac consultation on  04/13/23\par Last medical problem was 20 years ago when she had bilateral mastectomy.  She has had atrial fibrillation with anticoagulation for more than 10 years.  She is wheelchair-bound.  She now has 2+ pedal edema bilaterally which despite Lasix 40 mg daily has not dissipated.  She has moderate  increase in her shortness of breath.\par Her echo showed moderate to severe low gradient aortic stenosis and mild to moderate aortic insufficiency.  There was also moderate mitral regurgitation\par She recently had cardiac cath which revealed nonobstructive coronary disease and normal right-sided pressures\par She continues to become breathless on minimal to moderate activity\par However she is significantly overweight and has difficulty walking because of joint problems and uses a walker.

## 2023-04-13 NOTE — PHYSICAL EXAM
[Well Developed] : well developed [Well Nourished] : well nourished [No Acute Distress] : no acute distress [Normal Conjunctiva] : normal conjunctiva [Normal Venous Pressure] : normal venous pressure [No Carotid Bruit] : no carotid bruit [No Rub] : no rub [No Gallop] : no gallop [Clear Lung Fields] : clear lung fields [Good Air Entry] : good air entry [No Respiratory Distress] : no respiratory distress  [Soft] : abdomen soft [Non Tender] : non-tender [No Masses/organomegaly] : no masses/organomegaly [Normal Bowel Sounds] : normal bowel sounds [Normal Gait] : normal gait [No Edema] : no edema [No Cyanosis] : no cyanosis [No Clubbing] : no clubbing [No Varicosities] : no varicosities [No Rash] : no rash [No Skin Lesions] : no skin lesions [Moves all extremities] : moves all extremities [No Focal Deficits] : no focal deficits [Normal Speech] : normal speech [Normal] : alert and oriented, normal memory [Alert and Oriented] : alert and oriented [Normal memory] : normal memory [de-identified] : Afib. 2-3/6 DARY

## 2023-04-13 NOTE — DISCUSSION/SUMMARY
[FreeTextEntry1] : 1) Continue Xarelto\par 2) Increase lasix to with 40mg daily\par 3) she will try and manage for now but if symptoms get worse she will come back sooner otherwise I will see her again in 2 to 3 months.\par Cleared for knee surgery

## 2023-05-10 ENCOUNTER — NON-APPOINTMENT (OUTPATIENT)
Age: 83
End: 2023-05-10

## 2023-05-12 ENCOUNTER — APPOINTMENT (OUTPATIENT)
Dept: CARDIOLOGY | Facility: CLINIC | Age: 83
End: 2023-05-12

## 2023-07-25 ENCOUNTER — APPOINTMENT (OUTPATIENT)
Dept: CARDIOLOGY | Facility: CLINIC | Age: 83
End: 2023-07-25
Payer: MEDICARE

## 2023-07-25 VITALS
BODY MASS INDEX: 39.2 KG/M2 | DIASTOLIC BLOOD PRESSURE: 78 MMHG | HEIGHT: 62 IN | WEIGHT: 213 LBS | SYSTOLIC BLOOD PRESSURE: 124 MMHG | HEART RATE: 57 BPM | OXYGEN SATURATION: 95 %

## 2023-07-25 DIAGNOSIS — Z00.00 ENCOUNTER FOR GENERAL ADULT MEDICAL EXAMINATION W/OUT ABNORMAL FINDINGS: ICD-10-CM

## 2023-07-25 PROCEDURE — 99215 OFFICE O/P EST HI 40 MIN: CPT

## 2023-07-25 NOTE — REASON FOR VISIT
[Cardiac Failure] : cardiac failure [Arrhythmia/ECG Abnorrmalities] : arrhythmia/ECG abnormalities [FreeTextEntry3] : Dr. Riddle [FreeTextEntry1] : I saw this 83-year-old woman in f/u cardiac consultation on  07/25/23\par Last medical problem was 20 years ago when she had bilateral mastectomy.  She has had atrial fibrillation with anticoagulation for more than 10 years.  She is wheelchair-bound.  She now has 2+ pedal edema bilaterally which despite Lasix 40 mg daily has not dissipated.  She has moderate  increase in her shortness of breath.\par Her echo showed moderate to severe low gradient aortic stenosis and mild to moderate aortic insufficiency.  There was also moderate mitral regurgitation\par She recently had cardiac cath which revealed nonobstructive coronary disease and normal right-sided pressures\par She continues to become breathless on minimal to moderate activity\par However she is significantly overweight and has difficulty walking because of joint problems and uses a walker.\par 2 months ago she had an acute abdomen and required bowel surgery for bowel obstruction.\par

## 2023-07-25 NOTE — PHYSICAL EXAM
[Well Developed] : well developed [Well Nourished] : well nourished [No Acute Distress] : no acute distress [Normal Conjunctiva] : normal conjunctiva [Normal Venous Pressure] : normal venous pressure [No Carotid Bruit] : no carotid bruit [No Rub] : no rub [No Gallop] : no gallop [Clear Lung Fields] : clear lung fields [Good Air Entry] : good air entry [No Respiratory Distress] : no respiratory distress  [Soft] : abdomen soft [Non Tender] : non-tender [No Masses/organomegaly] : no masses/organomegaly [Normal Bowel Sounds] : normal bowel sounds [Normal Gait] : normal gait [No Edema] : no edema [No Cyanosis] : no cyanosis [No Clubbing] : no clubbing [No Varicosities] : no varicosities [No Rash] : no rash [No Skin Lesions] : no skin lesions [Moves all extremities] : moves all extremities [No Focal Deficits] : no focal deficits [Normal Speech] : normal speech [Normal] : alert and oriented, normal memory [Alert and Oriented] : alert and oriented [Normal memory] : normal memory [de-identified] : Afib. 2-3/6 DARY

## 2023-09-18 ENCOUNTER — APPOINTMENT (OUTPATIENT)
Dept: CARDIOLOGY | Facility: CLINIC | Age: 83
End: 2023-09-18
Payer: MEDICARE

## 2023-09-18 PROCEDURE — 93306 TTE W/DOPPLER COMPLETE: CPT

## 2023-09-26 ENCOUNTER — APPOINTMENT (OUTPATIENT)
Dept: CARDIOLOGY | Facility: CLINIC | Age: 83
End: 2023-09-26

## 2023-10-05 ENCOUNTER — APPOINTMENT (OUTPATIENT)
Dept: CARDIOLOGY | Facility: CLINIC | Age: 83
End: 2023-10-05
Payer: MEDICARE

## 2023-10-05 ENCOUNTER — NON-APPOINTMENT (OUTPATIENT)
Age: 83
End: 2023-10-05

## 2023-10-05 VITALS
HEIGHT: 72 IN | BODY MASS INDEX: 29.8 KG/M2 | OXYGEN SATURATION: 98 % | HEART RATE: 79 BPM | SYSTOLIC BLOOD PRESSURE: 124 MMHG | DIASTOLIC BLOOD PRESSURE: 82 MMHG | WEIGHT: 220 LBS

## 2023-10-05 PROCEDURE — 99214 OFFICE O/P EST MOD 30 MIN: CPT

## 2023-10-05 PROCEDURE — 93000 ELECTROCARDIOGRAM COMPLETE: CPT

## 2023-10-05 RX ORDER — FUROSEMIDE 40 MG/1
40 TABLET ORAL
Qty: 90 | Refills: 3 | Status: DISCONTINUED | COMMUNITY
Start: 2022-11-22 | End: 2023-10-05

## 2023-10-12 ENCOUNTER — RESULT REVIEW (OUTPATIENT)
Age: 83
End: 2023-10-12

## 2023-11-02 ENCOUNTER — APPOINTMENT (OUTPATIENT)
Dept: CARDIOLOGY | Facility: CLINIC | Age: 83
End: 2023-11-02

## 2023-11-28 ENCOUNTER — APPOINTMENT (OUTPATIENT)
Dept: GASTROENTEROLOGY | Facility: CLINIC | Age: 83
End: 2023-11-28

## 2023-12-28 ENCOUNTER — RESULT REVIEW (OUTPATIENT)
Age: 83
End: 2023-12-28

## 2023-12-28 ENCOUNTER — LABORATORY RESULT (OUTPATIENT)
Age: 83
End: 2023-12-28

## 2023-12-28 ENCOUNTER — APPOINTMENT (OUTPATIENT)
Age: 83
End: 2023-12-28
Payer: MEDICARE

## 2023-12-28 ENCOUNTER — OUTPATIENT (OUTPATIENT)
Dept: OUTPATIENT SERVICES | Facility: HOSPITAL | Age: 83
LOS: 1 days | End: 2023-12-28
Payer: MEDICARE

## 2023-12-28 ENCOUNTER — NON-APPOINTMENT (OUTPATIENT)
Age: 83
End: 2023-12-28

## 2023-12-28 DIAGNOSIS — M06.9 RHEUMATOID ARTHRITIS, UNSPECIFIED: ICD-10-CM

## 2023-12-28 DIAGNOSIS — D64.9 ANEMIA, UNSPECIFIED: ICD-10-CM

## 2023-12-28 DIAGNOSIS — R79.89 OTHER SPECIFIED ABNORMAL FINDINGS OF BLOOD CHEMISTRY: ICD-10-CM

## 2023-12-28 LAB
BASOPHILS # BLD AUTO: 0.05 K/UL — SIGNIFICANT CHANGE UP (ref 0–0.2)
BASOPHILS # BLD AUTO: 0.05 K/UL — SIGNIFICANT CHANGE UP (ref 0–0.2)
BASOPHILS NFR BLD AUTO: 0.8 % — SIGNIFICANT CHANGE UP (ref 0–2)
BASOPHILS NFR BLD AUTO: 0.8 % — SIGNIFICANT CHANGE UP (ref 0–2)
EOSINOPHIL # BLD AUTO: 0.26 K/UL — SIGNIFICANT CHANGE UP (ref 0–0.5)
EOSINOPHIL # BLD AUTO: 0.26 K/UL — SIGNIFICANT CHANGE UP (ref 0–0.5)
EOSINOPHIL NFR BLD AUTO: 4.2 % — SIGNIFICANT CHANGE UP (ref 0–6)
EOSINOPHIL NFR BLD AUTO: 4.2 % — SIGNIFICANT CHANGE UP (ref 0–6)
HCT VFR BLD CALC: 36 % — SIGNIFICANT CHANGE UP (ref 34.5–45)
HCT VFR BLD CALC: 36 % — SIGNIFICANT CHANGE UP (ref 34.5–45)
HGB BLD-MCNC: 11.7 G/DL — SIGNIFICANT CHANGE UP (ref 11.5–15.5)
HGB BLD-MCNC: 11.7 G/DL — SIGNIFICANT CHANGE UP (ref 11.5–15.5)
IMM GRANULOCYTES NFR BLD AUTO: 0.3 % — SIGNIFICANT CHANGE UP (ref 0–0.9)
IMM GRANULOCYTES NFR BLD AUTO: 0.3 % — SIGNIFICANT CHANGE UP (ref 0–0.9)
LYMPHOCYTES # BLD AUTO: 1.18 K/UL — SIGNIFICANT CHANGE UP (ref 1–3.3)
LYMPHOCYTES # BLD AUTO: 1.18 K/UL — SIGNIFICANT CHANGE UP (ref 1–3.3)
LYMPHOCYTES # BLD AUTO: 19 % — SIGNIFICANT CHANGE UP (ref 13–44)
LYMPHOCYTES # BLD AUTO: 19 % — SIGNIFICANT CHANGE UP (ref 13–44)
MCHC RBC-ENTMCNC: 29 PG — SIGNIFICANT CHANGE UP (ref 27–34)
MCHC RBC-ENTMCNC: 29 PG — SIGNIFICANT CHANGE UP (ref 27–34)
MCHC RBC-ENTMCNC: 32.5 GM/DL — SIGNIFICANT CHANGE UP (ref 32–36)
MCHC RBC-ENTMCNC: 32.5 GM/DL — SIGNIFICANT CHANGE UP (ref 32–36)
MCV RBC AUTO: 89.1 FL — SIGNIFICANT CHANGE UP (ref 80–100)
MCV RBC AUTO: 89.1 FL — SIGNIFICANT CHANGE UP (ref 80–100)
MONOCYTES # BLD AUTO: 0.33 K/UL — SIGNIFICANT CHANGE UP (ref 0–0.9)
MONOCYTES # BLD AUTO: 0.33 K/UL — SIGNIFICANT CHANGE UP (ref 0–0.9)
MONOCYTES NFR BLD AUTO: 5.3 % — SIGNIFICANT CHANGE UP (ref 2–14)
MONOCYTES NFR BLD AUTO: 5.3 % — SIGNIFICANT CHANGE UP (ref 2–14)
NEUTROPHILS # BLD AUTO: 4.36 K/UL — SIGNIFICANT CHANGE UP (ref 1.8–7.4)
NEUTROPHILS # BLD AUTO: 4.36 K/UL — SIGNIFICANT CHANGE UP (ref 1.8–7.4)
NEUTROPHILS NFR BLD AUTO: 70.4 % — SIGNIFICANT CHANGE UP (ref 43–77)
NEUTROPHILS NFR BLD AUTO: 70.4 % — SIGNIFICANT CHANGE UP (ref 43–77)
NRBC # BLD: 0 /100 WBCS — SIGNIFICANT CHANGE UP (ref 0–0)
NRBC # BLD: 0 /100 WBCS — SIGNIFICANT CHANGE UP (ref 0–0)
PLATELET # BLD AUTO: 200 K/UL — SIGNIFICANT CHANGE UP (ref 150–400)
PLATELET # BLD AUTO: 200 K/UL — SIGNIFICANT CHANGE UP (ref 150–400)
RBC # BLD: 4.04 M/UL — SIGNIFICANT CHANGE UP (ref 3.8–5.2)
RBC # BLD: 4.04 M/UL — SIGNIFICANT CHANGE UP (ref 3.8–5.2)
RBC # FLD: 19.6 % — HIGH (ref 10.3–14.5)
RBC # FLD: 19.6 % — HIGH (ref 10.3–14.5)
WBC # BLD: 6.2 K/UL — SIGNIFICANT CHANGE UP (ref 3.8–10.5)
WBC # BLD: 6.2 K/UL — SIGNIFICANT CHANGE UP (ref 3.8–10.5)
WBC # FLD AUTO: 6.2 K/UL — SIGNIFICANT CHANGE UP (ref 3.8–10.5)
WBC # FLD AUTO: 6.2 K/UL — SIGNIFICANT CHANGE UP (ref 3.8–10.5)

## 2023-12-28 PROCEDURE — 85027 COMPLETE CBC AUTOMATED: CPT

## 2023-12-28 PROCEDURE — 99204 OFFICE O/P NEW MOD 45 MIN: CPT

## 2023-12-28 NOTE — HISTORY OF PRESENT ILLNESS
Goal Outcome Evaluation:  Plan of Care Reviewed With: patient  Patient Agreement with Plan of Care: agrees  Consent Given to Review Plan with: son  Progress: no change  Outcome Evaluation: Therapist met with Patient to review care plan, social history, and aftercare recommendations; Patient agreeable.      Problem: Adult Behavioral Health Plan of Care  Goal: Plan of Care Review  Outcome: Ongoing, Progressing  Flowsheets (Taken 6/5/2023 1137)  Consent Given to Review Plan with: son  Progress: no change  Plan of Care Reviewed With: patient  Patient Agreement with Plan of Care: agrees  Outcome Evaluation:   Therapist met with Patient to review care plan, social history, and aftercare recommendations   Patient agreeable.  Goal: Patient-Specific Goal (Individualization)  Outcome: Ongoing, Progressing  Flowsheets  Taken 6/5/2023 1137  Patient-Specific Goals (Include Timeframe): Identify 2-3 copng skills, address relapse prevention measures, complete aftercare plans, and deny SI/HI prior to discharge.  Individualized Care Needs: Therapist to offer 1-4 therapy sessions, aftercare planning, safety planning, family education, group therapy, and brief CBT/MI interventions.  Anxieties, Fears or Concerns: none voiced  Taken 6/5/2023 1126  Patient Personal Strengths:   resourceful   resilient   self-reliant  Patient Vulnerabilities: lacks insight into illness  Goal: Optimized Coping Skills in Response to Life Stressors  Outcome: Ongoing, Progressing  Flowsheets (Taken 6/5/2023 1137)  Optimized Coping Skills in Response to Life Stressors: making progress toward outcome  Intervention: Promote Effective Coping Strategies  Flowsheets (Taken 6/5/2023 1137)  Supportive Measures:   active listening utilized   counseling provided   goal-setting facilitated   verbalization of feelings encouraged  Goal: Develops/Participates in Therapeutic [place] to Support Successful Transition  Outcome: Ongoing, Progressing  Flowsheets (Taken 6/5/2023  1137)  Develops/Participates in Therapeutic [place] to Support Successful Transition: making progress toward outcome  Intervention: Foster Therapeutic [place]  Flowsheets (Taken 6/5/2023 1137)  Trust Relationship/Rapport:   care explained   reassurance provided   choices provided   thoughts/feelings acknowledged   emotional support provided   empathic listening provided   questions answered   questions encouraged  Intervention: Mutually Develop Transition Plan  Flowsheets  Taken 6/5/2023 1137 by Olivia Funes MSW  Outpatient/Agency/Support Group Needs:   outpatient counseling   outpatient medication management  Discharge Coordination/Progress:   Therapist met with Patient to complete discharge needs assessment   Patient agreeable.  Transition Support: follow-up care discussed  Anticipated Discharge Disposition: home with family  Transportation Concerns: no car  Current Discharge Risk: psychiatric illness  Concerns to be Addressed:   coping/stress   mental health  Readmission Within the Last 30 Days: previous discharge plan unsuccessful  Patient/Family Anticipated Services at Transition: outpatient care  Patient's Choice of Community Agency(s): [facility]  Patient/Family Anticipates Transition to: home with family  Offered/Gave Vendor List: no  Taken 6/3/2023 7917 by Melissa Adrian RN  Transportation Anticipated: family or friend will provide     DATA: Therapist met individually with patient this date to introduce role and to discuss hospitalization expectations. Patient agreeable.     Patient signed consent for her son, Luigi. This therapist called and spoke with him today. He states that Patient continued to hear voices when she left the hospital. He states that on her way home she thought that there were four other people in her sons car that were not there. He states that this is distressing to Patient and he is hopeful that she will get relief soon. Safety planning was completed with Luigi. He was encouraged to  make the home as safe as possible by securing any weapons or harmful objects. He is agreeable to do so.     Patient plans to start  IOP with [facility] when leaving here.      Clinical Maneuvering/Intervention:     Therapist assisted patient in processing above session content; acknowledged and normalized patient’s thoughts, feelings, and concerns.  Discussed the therapist/patient relationship and explain the parameters and limitations of relative confidentiality.  Also discussed the importance of active participation, and honesty to the treatment process.  Encouraged the patient to discuss/vent their feelings, frustrations, and fears concerning their ongoing medical issues and validated their feelings.     Discussed the importance of finding enjoyable activities and coping skills that the patient can engage in a regular basis. Discussed healthy coping skills such as distraction, self love, grounding, thought challenges/reframing, etc.  Provided patient with list of healthy coping skills this date. Discussed the importance of medication compliance.  Praised the patient for seeking help and spent the majority of the session building rapport.       Allowed patient to freely discuss issues without interruption or judgment. Provided safe, confidential environment to facilitate the development of positive therapeutic relationship and encourage open, honest communication.      Therapist addressed discharge safety planning this date. Assisted patient in identifying risk factors which would indicate the need for higher level of care after discharge;  including thoughts to harm self or others and/or self-harming behavior. Encouraged patient to call 911, or present to the nearest emergency room should any of these events occur. Discussed crisis intervention services and means to access.  Encouraged securing any objects of harm.       Therapist completed integrated summary, treatment plan, and initiated social history this date.   Therapist is strongly encouraging family involvement in treatment.       ASSESSMENT: Anna Moore is a 53 year old  female living in [facility]. Patient has a 10th grade education and is currently unemployed. Patient was recently discharged [facility] the [facility] on 5/[facility]/23, then readmitted on 6/3/23. Patient reports that she continued to hear voices and be paranoid when she returned home. Because of this she felt the need to return to the hospital. Patient denies any other stressors of problems. However she gets mixed support from her family and loved ones. She states that her son Is very supportive, and she plans to return home with him at discharge. She is hopeful that her medication are just taking a while to become affective. Patient was calm and cooperative during assessment.      PLAN:       Patient to remain hospitalized this date.     Treatment team will focus efforts on stabilizing patient's acute symptoms while providing education on healthy coping and crisis management to reduce hospitalizations.   Patient requires daily psychiatrist evaluation and 24/7 nursing supervision to promote patient  safety.     Therapist will offer 1-4 individual sessions, 1 therapy group daily, family education, and appropriate referral.    Therapist recommends intensive outpatient program.       [de-identified] : Pt was seen initially on 10/10/23 at [facility] for anemia. She has a PMH of HTN, HLD, afib (on Xarelto), CAD abd CKD3. She was noted to have a HGB of 7.2 with decreased iron indices. She reports that since her surgical resection for bowel obstruction in May 2023 she has had anemia. She did have an endoscopy and colonoscopy during that admission with Dr. Leung which did not reveal any active bleeding. She did receive a transfusion of PRBC's as well as Venofer x 5 doses. Her most recent HGB on 11/22/23 was 10.8, HCT= 35.1. BUN= 40, creatinine= 1.71  PMH: CAD, Afib, RA, Breast cancer s/p mastectomy and chemotherapy (in [place])  PSH: Khoi. mastectomy, SBR, [place] in May 2023

## 2023-12-28 NOTE — CONSULT LETTER
[Dear  ___] : Dear  [unfilled], [Consult Letter:] : I had the pleasure of evaluating your patient, [unfilled]. [Please see my note below.] : Please see my note below. [Consult Closing:] : Thank you very much for allowing me to participate in the care of this patient.  If you have any questions, please do not hesitate to contact me. [Sincerely,] : Sincerely, [DrCourtney  ___] : Dr. LOW [FreeTextEntry3] : Stephanie Joel, SANDER, ANP-c

## 2023-12-28 NOTE — REASON FOR VISIT
[Initial Consultation] : an initial consultation for [Blood Count Assessment] : blood count assessment [Formal Caregiver] : formal caregiver

## 2023-12-28 NOTE — RESULTS/DATA
[FreeTextEntry1] : STEFANI PEÑA  is a 83 year old female who presents for initial outpatient evaluation of anemia. She was seen in [facility] for hematology consult on 10/10/23.

## 2023-12-28 NOTE — REVIEW OF SYSTEMS
[Fatigue] : fatigue [Vision Problems] : vision problems [Lower Ext Edema] : lower extremity edema [Shortness Of Breath] : shortness of breath [SOB on Exertion] : shortness of breath during exertion [Diarrhea: Grade 0] : Diarrhea: Grade 0 [Joint Stiffness] : joint stiffness [Muscle Weakness] : muscle weakness [Difficulty Walking] : difficulty walking [Easy Bruising] : a tendency for easy bruising [Fever] : no fever [Chills] : no chills [Night Sweats] : no night sweats [Chest Pain] : no chest pain [Palpitations] : no palpitations [Wheezing] : no wheezing [Cough] : no cough [Abdominal Pain] : no abdominal pain [Vomiting] : no vomiting [Constipation] : no constipation [Dysmenorrhea/Abn Vaginal Bleeding] : no dysmenorrhea/abnormal vaginal bleeding [Skin Rash] : no skin rash [Skin Wound] : no skin wound [Confused] : no confusion [Dizziness] : no dizziness [Insomnia] : no insomnia [Anxiety] : no anxiety [Easy Bleeding] : no tendency for easy bleeding [Swollen Glands] : no swollen glands [FreeTextEntry3] : wears glasses [de-identified] : generalized weakness

## 2023-12-28 NOTE — PHYSICAL EXAM
[Capable of only limited self care, confined to bed or chair more than 50% of waking hours] : Status 3- Capable of only limited self care, confined to bed or chair more than 50% of waking hours [Normal] : affect appropriate [de-identified] : Gr 3/6 DARY, irreg. HR [de-identified] : presents in a motorized scooter

## 2023-12-29 LAB
ALBUMIN SERPL ELPH-MCNC: 4.4 G/DL
ALP BLD-CCNC: 115 U/L
ALT SERPL-CCNC: 8 U/L
ANA PAT FLD IF-IMP: ABNORMAL
ANA SER IF-ACNC: ABNORMAL
ANION GAP SERPL CALC-SCNC: 15 MMOL/L
AST SERPL-CCNC: 21 U/L
BILIRUB SERPL-MCNC: 0.6 MG/DL
BUN SERPL-MCNC: 70 MG/DL
CALCIUM SERPL-MCNC: 9.5 MG/DL
CHLORIDE SERPL-SCNC: 102 MMOL/L
CO2 SERPL-SCNC: 25 MMOL/L
CREAT SERPL-MCNC: 2.05 MG/DL
CRP SERPL-MCNC: <3 MG/L
EGFR: 24 ML/MIN/1.73M2
ERYTHROCYTE [SEDIMENTATION RATE] IN BLOOD BY WESTERGREN METHOD: 76 MM/HR
FERRITIN SERPL-MCNC: 191 NG/ML
GLUCOSE SERPL-MCNC: 99 MG/DL
HAPTOGLOB SERPL-MCNC: 178 MG/DL
IRON SATN MFR SERPL: 29 %
IRON SERPL-MCNC: 86 UG/DL
LDH SERPL-CCNC: 224 U/L
POTASSIUM SERPL-SCNC: 3.8 MMOL/L
PROT SERPL-MCNC: 7 G/DL
RBC # BLD: 3.98 M/UL
RETICS # AUTO: 1.1 %
RETICS AGGREG/RBC NFR: 42.2 K/UL
RHEUMATOID FACT SER QL: 13 IU/ML
SODIUM SERPL-SCNC: 142 MMOL/L
TIBC SERPL-MCNC: 300 UG/DL
TRANSFERRIN SERPL-MCNC: 247 MG/DL
UIBC SERPL-MCNC: 214 UG/DL

## 2024-01-04 ENCOUNTER — APPOINTMENT (OUTPATIENT)
Dept: CARDIOLOGY | Facility: CLINIC | Age: 84
End: 2024-01-04

## 2024-01-11 ENCOUNTER — APPOINTMENT (OUTPATIENT)
Dept: CARDIOLOGY | Facility: CLINIC | Age: 84
End: 2024-01-11

## 2024-02-01 ENCOUNTER — APPOINTMENT (OUTPATIENT)
Age: 84
End: 2024-02-01

## 2024-02-08 ENCOUNTER — APPOINTMENT (OUTPATIENT)
Dept: CARDIOLOGY | Facility: CLINIC | Age: 84
End: 2024-02-08
Payer: MEDICARE

## 2024-02-08 VITALS
BODY MASS INDEX: 26.55 KG/M2 | DIASTOLIC BLOOD PRESSURE: 60 MMHG | HEART RATE: 72 BPM | WEIGHT: 196 LBS | HEIGHT: 72 IN | OXYGEN SATURATION: 97 % | SYSTOLIC BLOOD PRESSURE: 104 MMHG

## 2024-02-08 DIAGNOSIS — I35.2 NONRHEUMATIC AORTIC (VALVE) STENOSIS WITH INSUFFICIENCY: ICD-10-CM

## 2024-02-08 DIAGNOSIS — I27.20 PULMONARY HYPERTENSION, UNSPECIFIED: ICD-10-CM

## 2024-02-08 DIAGNOSIS — R06.02 SHORTNESS OF BREATH: ICD-10-CM

## 2024-02-08 DIAGNOSIS — I35.0 NONRHEUMATIC AORTIC (VALVE) STENOSIS: ICD-10-CM

## 2024-02-08 DIAGNOSIS — R60.0 LOCALIZED EDEMA: ICD-10-CM

## 2024-02-08 DIAGNOSIS — Z99.89 DEPENDENCE ON OTHER ENABLING MACHINES AND DEVICES: ICD-10-CM

## 2024-02-08 DIAGNOSIS — I48.91 UNSPECIFIED ATRIAL FIBRILLATION: ICD-10-CM

## 2024-02-08 DIAGNOSIS — I34.0 NONRHEUMATIC MITRAL (VALVE) INSUFFICIENCY: ICD-10-CM

## 2024-02-08 PROCEDURE — 99215 OFFICE O/P EST HI 40 MIN: CPT

## 2024-02-08 PROCEDURE — G2211 COMPLEX E/M VISIT ADD ON: CPT

## 2024-02-08 RX ORDER — TORSEMIDE 20 MG/1
20 TABLET ORAL
Qty: 90 | Refills: 1 | Status: DISCONTINUED | COMMUNITY
Start: 2023-10-05 | End: 2024-02-08

## 2024-02-08 NOTE — REASON FOR VISIT
[Cardiac Failure] : cardiac failure [Arrhythmia/ECG Abnorrmalities] : arrhythmia/ECG abnormalities [FreeTextEntry3] : Dr. Riddle [FreeTextEntry1] : I saw this 84-year-old woman in f/u cardiac consultation on  02/08/24 Last medical problem was 20 years ago when she had bilateral mastectomy.  She has had atrial fibrillation with anticoagulation for more than 10 years.  She is wheelchair-bound.  She now has 2+ pedal edema bilaterally which despite Lasix 40 mg daily has not dissipated.  She has moderate  increase in her shortness of breath. 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  There was also moderate mitral regurgitation She recently had cardiac cath which revealed nonobstructive coronary disease and normal right-sided pressures She continues to become breathless on minimal to moderate activity However she is significantly overweight and has difficulty walking because of joint problems and uses a walker. 2 months ago she had an acute abdomen and required bowel surgery for bowel obstruction. She comes in for routine follow-up visit and seems to be some confusion of her diuretic therapy.  What ever she was on we have narrowed it down to her to take furosemide 20 daily and Aldactone 25 daily.

## 2024-02-08 NOTE — PHYSICAL EXAM
[Well Developed] : well developed [Well Nourished] : well nourished [No Acute Distress] : no acute distress [Normal Conjunctiva] : normal conjunctiva [Normal Venous Pressure] : normal venous pressure [No Carotid Bruit] : no carotid bruit [No Rub] : no rub [No Gallop] : no gallop [Clear Lung Fields] : clear lung fields [Good Air Entry] : good air entry [No Respiratory Distress] : no respiratory distress  [Soft] : abdomen soft [Non Tender] : non-tender [No Masses/organomegaly] : no masses/organomegaly [Normal Bowel Sounds] : normal bowel sounds [Normal Gait] : normal gait [No Edema] : no edema [No Cyanosis] : no cyanosis [No Clubbing] : no clubbing [No Varicosities] : no varicosities [No Rash] : no rash [No Skin Lesions] : no skin lesions [Moves all extremities] : moves all extremities [No Focal Deficits] : no focal deficits [Normal Speech] : normal speech [Normal] : alert and oriented, normal memory [Alert and Oriented] : alert and oriented [Normal memory] : normal memory [de-identified] : Afib. 2-3/6 DARY

## 2024-02-08 NOTE — DISCUSSION/SUMMARY
[FreeTextEntry1] : 1) Continue Xarelto 2)  lasix  20mg daily + Aldactone 25mg QD 3) she will repeat the echo in 6 months to assess the aortic stenosis and the aortic regurgitation and then see me a week after repeating the echo.

## 2024-04-25 ENCOUNTER — APPOINTMENT (OUTPATIENT)
Dept: CARDIOLOGY | Facility: CLINIC | Age: 84
End: 2024-04-25

## 2024-05-02 ENCOUNTER — APPOINTMENT (OUTPATIENT)
Dept: CARDIOLOGY | Facility: CLINIC | Age: 84
End: 2024-05-02

## 2024-05-18 ENCOUNTER — RESULT REVIEW (OUTPATIENT)
Age: 84
End: 2024-05-18

## 2024-05-27 ENCOUNTER — TRANSCRIPTION ENCOUNTER (OUTPATIENT)
Age: 84
End: 2024-05-27

## 2024-05-28 ENCOUNTER — APPOINTMENT (OUTPATIENT)
Dept: CARDIOLOGY | Facility: CLINIC | Age: 84
End: 2024-05-28

## 2024-05-29 ENCOUNTER — TRANSCRIPTION ENCOUNTER (OUTPATIENT)
Age: 84
End: 2024-05-29

## 2024-06-04 ENCOUNTER — APPOINTMENT (OUTPATIENT)
Dept: CARDIOLOGY | Facility: CLINIC | Age: 84
End: 2024-06-04
Payer: MEDICARE

## 2024-06-04 VITALS
DIASTOLIC BLOOD PRESSURE: 60 MMHG | SYSTOLIC BLOOD PRESSURE: 88 MMHG | HEIGHT: 62 IN | BODY MASS INDEX: 31.28 KG/M2 | HEART RATE: 74 BPM | OXYGEN SATURATION: 99 % | WEIGHT: 170 LBS

## 2024-06-04 PROCEDURE — 93000 ELECTROCARDIOGRAM COMPLETE: CPT

## 2024-06-04 PROCEDURE — 99215 OFFICE O/P EST HI 40 MIN: CPT

## 2024-06-04 RX ORDER — METOPROLOL SUCCINATE 25 MG/1
25 TABLET, EXTENDED RELEASE ORAL DAILY
Qty: 30 | Refills: 5 | Status: ACTIVE | COMMUNITY
Start: 2024-06-04

## 2024-06-04 RX ORDER — METOPROLOL TARTRATE 25 MG/1
25 TABLET, FILM COATED ORAL TWICE DAILY
Qty: 180 | Refills: 3 | Status: DISCONTINUED | COMMUNITY
Start: 2023-03-29 | End: 2024-06-04

## 2024-06-04 RX ORDER — SPIRONOLACTONE 25 MG/1
25 TABLET ORAL DAILY
Qty: 90 | Refills: 3 | Status: DISCONTINUED | COMMUNITY
Start: 2023-03-21 | End: 2024-06-04

## 2024-06-04 RX ORDER — RIVAROXABAN 15 MG/1
15 TABLET, FILM COATED ORAL
Qty: 90 | Refills: 1 | Status: ACTIVE | COMMUNITY
Start: 2023-01-05

## 2024-06-04 RX ORDER — BUMETANIDE 1 MG/1
1 TABLET ORAL DAILY
Qty: 90 | Refills: 2 | Status: ACTIVE | COMMUNITY
Start: 2024-06-04

## 2024-06-04 RX ORDER — FUROSEMIDE 20 MG/1
20 TABLET ORAL DAILY
Qty: 60 | Refills: 3 | Status: DISCONTINUED | COMMUNITY
Start: 2024-02-08 | End: 2024-06-04

## 2024-06-04 NOTE — DISCUSSION/SUMMARY
[FreeTextEntry1] : 1) Continue Xarelto 2)  continue Bumex 1 mg QD check labs with BNP this week. 30 Education given regarding cardiomems. Pt will start using it when she is discharged from rehab.  Unable to add or uptitrate medications 2/2 hypotension.  [EKG obtained to assist in diagnosis and management of assessed problem(s)] : EKG obtained to assist in diagnosis and management of assessed problem(s)

## 2024-06-04 NOTE — PHYSICAL EXAM
[Well Developed] : well developed [Well Nourished] : well nourished [No Acute Distress] : no acute distress [Normal Conjunctiva] : normal conjunctiva [Normal Venous Pressure] : normal venous pressure [No Carotid Bruit] : no carotid bruit [No Rub] : no rub [No Gallop] : no gallop [Clear Lung Fields] : clear lung fields [Good Air Entry] : good air entry [No Respiratory Distress] : no respiratory distress  [Soft] : abdomen soft [Non Tender] : non-tender [No Masses/organomegaly] : no masses/organomegaly [Normal Bowel Sounds] : normal bowel sounds [No Cyanosis] : no cyanosis [No Clubbing] : no clubbing [No Varicosities] : no varicosities [No Rash] : no rash [No Skin Lesions] : no skin lesions [Moves all extremities] : moves all extremities [No Focal Deficits] : no focal deficits [Normal Speech] : normal speech [Normal] : alert and oriented, normal memory [Alert and Oriented] : alert and oriented [Normal memory] : normal memory [de-identified] : Afib. 3/6 DARY [de-identified] : in a wheelchair  [de-identified] : trace edema

## 2024-06-04 NOTE — HISTORY OF PRESENT ILLNESS
[FreeTextEntry1] : I saw this 84-year-old woman in f/u from hospitalization. She was admitted after c/o SOB and dizziness.  treated with IV lasix and a cardiomems was inserted PA 27/10 m10 PCW m10; CO 6.35 l/min. creat high 2.2 and 1.7 at discharge  EF 55-60% SHe remained in rate controlled AFIB. PT was discharged to SNF and is here for follow up. She states she is feeling better. She is in a wheelchair and can only ambulate a few feet. She was started on Bumex 1 mg, Xarelto decreased to 15 mg  Past history.  She has had atrial fibrillation with anticoagulation for more than 10 years.  She is wheelchair-bound.  She now has 2+ pedal edema bilaterally which despite Lasix 40 mg daily has not dissipated.  She has moderate  increase in her shortness of breath. 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  There was also moderate mitral regurgitation She recently had cardiac cath which revealed nonobstructive coronary disease and normal right-sided pressures She continues to become breathless on minimal to moderate activity However she is significantly overweight and has difficulty walking because of joint problems and uses a walker. 2 months ago she had an acute abdomen and required bowel surgery for bowel obstruction. She comes in for routine follow-up visit and seems to be some confusion of her diuretic therapy.  What ever she was on we have narrowed it down to her to take furosemide 20 daily and Aldactone 25 daily.

## 2024-06-13 ENCOUNTER — APPOINTMENT (OUTPATIENT)
Dept: CARDIOLOGY | Facility: CLINIC | Age: 84
End: 2024-06-13

## 2024-07-11 ENCOUNTER — RESULT REVIEW (OUTPATIENT)
Age: 84
End: 2024-07-11

## 2024-07-11 ENCOUNTER — APPOINTMENT (OUTPATIENT)
Dept: CARDIOLOGY | Facility: CLINIC | Age: 84
End: 2024-07-11

## 2024-07-24 ENCOUNTER — APPOINTMENT (OUTPATIENT)
Dept: CARDIOLOGY | Facility: CLINIC | Age: 84
End: 2024-07-24

## 2024-08-20 ENCOUNTER — APPOINTMENT (OUTPATIENT)
Dept: CARDIOLOGY | Facility: CLINIC | Age: 84
End: 2024-08-20

## 2024-10-24 ENCOUNTER — NON-APPOINTMENT (OUTPATIENT)
Age: 84
End: 2024-10-24

## 2024-10-31 ENCOUNTER — APPOINTMENT (OUTPATIENT)
Dept: CARDIOLOGY | Facility: CLINIC | Age: 84
End: 2024-10-31

## 2024-11-27 ENCOUNTER — APPOINTMENT (OUTPATIENT)
Dept: CARDIOLOGY | Facility: CLINIC | Age: 84
End: 2024-11-27

## 2024-12-04 ENCOUNTER — NON-APPOINTMENT (OUTPATIENT)
Age: 84
End: 2024-12-04

## 2024-12-04 ENCOUNTER — APPOINTMENT (OUTPATIENT)
Dept: CARDIOLOGY | Facility: CLINIC | Age: 84
End: 2024-12-04
Payer: MEDICARE

## 2024-12-04 VITALS
BODY MASS INDEX: 36.62 KG/M2 | DIASTOLIC BLOOD PRESSURE: 58 MMHG | OXYGEN SATURATION: 97 % | WEIGHT: 199 LBS | HEIGHT: 62 IN | HEART RATE: 75 BPM | SYSTOLIC BLOOD PRESSURE: 106 MMHG

## 2024-12-04 DIAGNOSIS — I35.2 NONRHEUMATIC AORTIC (VALVE) STENOSIS WITH INSUFFICIENCY: ICD-10-CM

## 2024-12-04 DIAGNOSIS — I27.20 PULMONARY HYPERTENSION, UNSPECIFIED: ICD-10-CM

## 2024-12-04 DIAGNOSIS — I34.0 NONRHEUMATIC MITRAL (VALVE) INSUFFICIENCY: ICD-10-CM

## 2024-12-04 DIAGNOSIS — I48.91 UNSPECIFIED ATRIAL FIBRILLATION: ICD-10-CM

## 2024-12-04 DIAGNOSIS — R06.02 SHORTNESS OF BREATH: ICD-10-CM

## 2024-12-04 DIAGNOSIS — R60.0 LOCALIZED EDEMA: ICD-10-CM

## 2024-12-04 PROCEDURE — 93000 ELECTROCARDIOGRAM COMPLETE: CPT

## 2024-12-04 PROCEDURE — 99215 OFFICE O/P EST HI 40 MIN: CPT

## 2025-01-14 NOTE — HISTORY OF PRESENT ILLNESS
[FreeTextEntry1] : She has no chest pain\par  She has mild to moderate  shortness of breath\par  She has no palpitations\par  She has no syncope\par  She is neurologically intact\par  She has 2+ bilateral edema\par  She has no skin rashes\par  
stated

## 2025-05-17 ENCOUNTER — APPOINTMENT (EMERGENCY)
Dept: RADIOLOGY | Facility: HOSPITAL | Age: 85
DRG: 291 | End: 2025-05-17
Payer: COMMERCIAL

## 2025-05-17 ENCOUNTER — HOSPITAL ENCOUNTER (INPATIENT)
Facility: HOSPITAL | Age: 85
LOS: 5 days | Discharge: NURSING, REHAB OR LONG-TERM CARE | DRG: 291 | End: 2025-05-22
Attending: EMERGENCY MEDICINE | Admitting: INTERNAL MEDICINE
Payer: COMMERCIAL

## 2025-05-17 DIAGNOSIS — R05.9 COUGH: ICD-10-CM

## 2025-05-17 DIAGNOSIS — K21.9 GASTROESOPHAGEAL REFLUX DISEASE WITHOUT ESOPHAGITIS: ICD-10-CM

## 2025-05-17 DIAGNOSIS — N18.4 STAGE 4 CHRONIC KIDNEY DISEASE (HCC): ICD-10-CM

## 2025-05-17 DIAGNOSIS — I50.33 ACUTE ON CHRONIC DIASTOLIC CONGESTIVE HEART FAILURE (HCC): Primary | ICD-10-CM

## 2025-05-17 DIAGNOSIS — D64.9 ANEMIA: ICD-10-CM

## 2025-05-17 DIAGNOSIS — I50.9 CHF EXACERBATION (HCC): ICD-10-CM

## 2025-05-17 DIAGNOSIS — R13.11 ORAL PHASE DYSPHAGIA: ICD-10-CM

## 2025-05-17 PROBLEM — M06.9 RHEUMATOID ARTHRITIS (HCC): Status: ACTIVE | Noted: 2025-05-17

## 2025-05-17 PROBLEM — I48.0 PAROXYSMAL ATRIAL FIBRILLATION (HCC): Status: ACTIVE | Noted: 2025-05-17

## 2025-05-17 LAB
2HR DELTA HS TROPONIN: -10 NG/L
ABO GROUP BLD: NORMAL
ABO GROUP BLD: NORMAL
ALBUMIN SERPL BCG-MCNC: 4 G/DL (ref 3.5–5)
ALP SERPL-CCNC: 63 U/L (ref 34–104)
ALT SERPL W P-5'-P-CCNC: 20 U/L (ref 7–52)
ANION GAP SERPL CALCULATED.3IONS-SCNC: 10 MMOL/L (ref 4–13)
ANISOCYTOSIS BLD QL SMEAR: PRESENT
APTT PPP: 30 SECONDS (ref 23–34)
AST SERPL W P-5'-P-CCNC: 29 U/L (ref 13–39)
BACTERIA UR QL AUTO: ABNORMAL /HPF
BASOPHILS # BLD AUTO: 0.01 THOUSANDS/ÂΜL (ref 0–0.1)
BASOPHILS NFR BLD AUTO: 0 % (ref 0–1)
BILIRUB SERPL-MCNC: 0.63 MG/DL (ref 0.2–1)
BILIRUB UR QL STRIP: NEGATIVE
BLD GP AB SCN SERPL QL: NEGATIVE
BNP SERPL-MCNC: 233 PG/ML (ref 0–100)
BUN SERPL-MCNC: 47 MG/DL (ref 5–25)
CALCIUM SERPL-MCNC: 8.6 MG/DL (ref 8.4–10.2)
CARDIAC TROPONIN I PNL SERPL HS: 43 NG/L (ref ?–50)
CARDIAC TROPONIN I PNL SERPL HS: 53 NG/L (ref ?–50)
CHLORIDE SERPL-SCNC: 109 MMOL/L (ref 96–108)
CLARITY UR: CLEAR
CO2 SERPL-SCNC: 21 MMOL/L (ref 21–32)
COLOR UR: YELLOW
CREAT SERPL-MCNC: 1.95 MG/DL (ref 0.6–1.3)
EOSINOPHIL # BLD AUTO: 0.01 THOUSAND/ÂΜL (ref 0–0.61)
EOSINOPHIL NFR BLD AUTO: 0 % (ref 0–6)
ERYTHROCYTE [DISTWIDTH] IN BLOOD BY AUTOMATED COUNT: 23.7 % (ref 11.6–15.1)
FLUAV AG UPPER RESP QL IA.RAPID: NEGATIVE
FLUBV AG UPPER RESP QL IA.RAPID: NEGATIVE
GFR SERPL CREATININE-BSD FRML MDRD: 23 ML/MIN/1.73SQ M
GLUCOSE SERPL-MCNC: 178 MG/DL (ref 65–140)
GLUCOSE UR STRIP-MCNC: NEGATIVE MG/DL
HCT VFR BLD AUTO: 23.5 % (ref 34.8–46.1)
HGB BLD-MCNC: 6.6 G/DL (ref 11.5–15.4)
HGB UR QL STRIP.AUTO: ABNORMAL
IMM GRANULOCYTES # BLD AUTO: 0.03 THOUSAND/UL (ref 0–0.2)
IMM GRANULOCYTES NFR BLD AUTO: 0 % (ref 0–2)
INR PPP: 1.65 (ref 0.85–1.19)
KETONES UR STRIP-MCNC: NEGATIVE MG/DL
LACTATE SERPL-SCNC: 2.1 MMOL/L (ref 0.5–2)
LACTATE SERPL-SCNC: 2.3 MMOL/L (ref 0.5–2)
LEUKOCYTE ESTERASE UR QL STRIP: ABNORMAL
LYMPHOCYTES # BLD AUTO: 0.34 THOUSANDS/ÂΜL (ref 0.6–4.47)
LYMPHOCYTES NFR BLD AUTO: 4 % (ref 14–44)
MCH RBC QN AUTO: 28.4 PG (ref 26.8–34.3)
MCHC RBC AUTO-ENTMCNC: 28.1 G/DL (ref 31.4–37.4)
MCV RBC AUTO: 101 FL (ref 82–98)
MONOCYTES # BLD AUTO: 0.14 THOUSAND/ÂΜL (ref 0.17–1.22)
MONOCYTES NFR BLD AUTO: 2 % (ref 4–12)
NEUTROPHILS # BLD AUTO: 7.29 THOUSANDS/ÂΜL (ref 1.85–7.62)
NEUTS SEG NFR BLD AUTO: 94 % (ref 43–75)
NITRITE UR QL STRIP: NEGATIVE
NON-SQ EPI CELLS URNS QL MICRO: ABNORMAL /HPF
NRBC BLD AUTO-RTO: 0 /100 WBCS
PH UR STRIP.AUTO: 6 [PH]
PLATELET # BLD AUTO: 190 THOUSANDS/UL (ref 149–390)
PLATELET BLD QL SMEAR: ADEQUATE
PMV BLD AUTO: 10.9 FL (ref 8.9–12.7)
POIKILOCYTOSIS BLD QL SMEAR: PRESENT
POTASSIUM SERPL-SCNC: 4 MMOL/L (ref 3.5–5.3)
PROCALCITONIN SERPL-MCNC: 0.06 NG/ML
PROT SERPL-MCNC: 5.9 G/DL (ref 6.4–8.4)
PROT UR STRIP-MCNC: NEGATIVE MG/DL
PROTHROMBIN TIME: 20 SECONDS (ref 12.3–15)
RBC # BLD AUTO: 2.32 MILLION/UL (ref 3.81–5.12)
RBC #/AREA URNS AUTO: ABNORMAL /HPF
RBC MORPH BLD: PRESENT
RH BLD: POSITIVE
RH BLD: POSITIVE
SARS-COV+SARS-COV-2 AG RESP QL IA.RAPID: NEGATIVE
SODIUM SERPL-SCNC: 140 MMOL/L (ref 135–147)
SP GR UR STRIP.AUTO: 1.01
SPECIMEN EXPIRATION DATE: NORMAL
UROBILINOGEN UR QL STRIP.AUTO: 0.2 E.U./DL
WBC # BLD AUTO: 7.82 THOUSAND/UL (ref 4.31–10.16)
WBC #/AREA URNS AUTO: ABNORMAL /HPF

## 2025-05-17 PROCEDURE — 93005 ELECTROCARDIOGRAM TRACING: CPT

## 2025-05-17 PROCEDURE — 83880 ASSAY OF NATRIURETIC PEPTIDE: CPT | Performed by: INTERNAL MEDICINE

## 2025-05-17 PROCEDURE — 82728 ASSAY OF FERRITIN: CPT | Performed by: INTERNAL MEDICINE

## 2025-05-17 PROCEDURE — 83550 IRON BINDING TEST: CPT | Performed by: INTERNAL MEDICINE

## 2025-05-17 PROCEDURE — 87811 SARS-COV-2 COVID19 W/OPTIC: CPT | Performed by: EMERGENCY MEDICINE

## 2025-05-17 PROCEDURE — 87186 SC STD MICRODIL/AGAR DIL: CPT | Performed by: EMERGENCY MEDICINE

## 2025-05-17 PROCEDURE — 86920 COMPATIBILITY TEST SPIN: CPT

## 2025-05-17 PROCEDURE — 87081 CULTURE SCREEN ONLY: CPT | Performed by: INTERNAL MEDICINE

## 2025-05-17 PROCEDURE — P9016 RBC LEUKOCYTES REDUCED: HCPCS

## 2025-05-17 PROCEDURE — 83540 ASSAY OF IRON: CPT | Performed by: INTERNAL MEDICINE

## 2025-05-17 PROCEDURE — 99291 CRITICAL CARE FIRST HOUR: CPT | Performed by: EMERGENCY MEDICINE

## 2025-05-17 PROCEDURE — 99223 1ST HOSP IP/OBS HIGH 75: CPT | Performed by: INTERNAL MEDICINE

## 2025-05-17 PROCEDURE — 86900 BLOOD TYPING SEROLOGIC ABO: CPT | Performed by: EMERGENCY MEDICINE

## 2025-05-17 PROCEDURE — 81001 URINALYSIS AUTO W/SCOPE: CPT | Performed by: EMERGENCY MEDICINE

## 2025-05-17 PROCEDURE — 94640 AIRWAY INHALATION TREATMENT: CPT

## 2025-05-17 PROCEDURE — 83605 ASSAY OF LACTIC ACID: CPT | Performed by: INTERNAL MEDICINE

## 2025-05-17 PROCEDURE — 85730 THROMBOPLASTIN TIME PARTIAL: CPT | Performed by: EMERGENCY MEDICINE

## 2025-05-17 PROCEDURE — 99285 EMERGENCY DEPT VISIT HI MDM: CPT

## 2025-05-17 PROCEDURE — 84484 ASSAY OF TROPONIN QUANT: CPT | Performed by: INTERNAL MEDICINE

## 2025-05-17 PROCEDURE — 87086 URINE CULTURE/COLONY COUNT: CPT | Performed by: EMERGENCY MEDICINE

## 2025-05-17 PROCEDURE — 80053 COMPREHEN METABOLIC PANEL: CPT | Performed by: EMERGENCY MEDICINE

## 2025-05-17 PROCEDURE — 87804 INFLUENZA ASSAY W/OPTIC: CPT | Performed by: EMERGENCY MEDICINE

## 2025-05-17 PROCEDURE — 85610 PROTHROMBIN TIME: CPT | Performed by: EMERGENCY MEDICINE

## 2025-05-17 PROCEDURE — 86850 RBC ANTIBODY SCREEN: CPT | Performed by: EMERGENCY MEDICINE

## 2025-05-17 PROCEDURE — 86901 BLOOD TYPING SEROLOGIC RH(D): CPT | Performed by: EMERGENCY MEDICINE

## 2025-05-17 PROCEDURE — 83605 ASSAY OF LACTIC ACID: CPT | Performed by: EMERGENCY MEDICINE

## 2025-05-17 PROCEDURE — 87040 BLOOD CULTURE FOR BACTERIA: CPT | Performed by: EMERGENCY MEDICINE

## 2025-05-17 PROCEDURE — 87077 CULTURE AEROBIC IDENTIFY: CPT | Performed by: EMERGENCY MEDICINE

## 2025-05-17 PROCEDURE — 36415 COLL VENOUS BLD VENIPUNCTURE: CPT | Performed by: EMERGENCY MEDICINE

## 2025-05-17 PROCEDURE — 84145 PROCALCITONIN (PCT): CPT | Performed by: EMERGENCY MEDICINE

## 2025-05-17 PROCEDURE — 36430 TRANSFUSION BLD/BLD COMPNT: CPT

## 2025-05-17 PROCEDURE — 96374 THER/PROPH/DIAG INJ IV PUSH: CPT

## 2025-05-17 PROCEDURE — 30233N1 TRANSFUSION OF NONAUTOLOGOUS RED BLOOD CELLS INTO PERIPHERAL VEIN, PERCUTANEOUS APPROACH: ICD-10-PCS | Performed by: HOSPITALIST

## 2025-05-17 PROCEDURE — 87147 CULTURE TYPE IMMUNOLOGIC: CPT | Performed by: INTERNAL MEDICINE

## 2025-05-17 PROCEDURE — 71045 X-RAY EXAM CHEST 1 VIEW: CPT

## 2025-05-17 PROCEDURE — 85025 COMPLETE CBC W/AUTO DIFF WBC: CPT | Performed by: EMERGENCY MEDICINE

## 2025-05-17 RX ORDER — FUROSEMIDE 10 MG/ML
50 INJECTION INTRAMUSCULAR; INTRAVENOUS 2 TIMES DAILY
Status: DISCONTINUED | OUTPATIENT
Start: 2025-05-18 | End: 2025-05-18

## 2025-05-17 RX ORDER — BUMETANIDE 0.25 MG/ML
1 INJECTION, SOLUTION INTRAMUSCULAR; INTRAVENOUS ONCE
Status: COMPLETED | OUTPATIENT
Start: 2025-05-17 | End: 2025-05-17

## 2025-05-17 RX ORDER — PANTOPRAZOLE SODIUM 40 MG/1
40 TABLET, DELAYED RELEASE ORAL
Status: DISCONTINUED | OUTPATIENT
Start: 2025-05-18 | End: 2025-05-22 | Stop reason: HOSPADM

## 2025-05-17 RX ORDER — BUDESONIDE AND FORMOTEROL FUMARATE DIHYDRATE 160; 4.5 UG/1; UG/1
2 AEROSOL RESPIRATORY (INHALATION) 2 TIMES DAILY
COMMUNITY

## 2025-05-17 RX ORDER — TOCILIZUMAB 180 MG/ML
162 INJECTION, SOLUTION SUBCUTANEOUS WEEKLY
COMMUNITY

## 2025-05-17 RX ORDER — ALLOPURINOL 100 MG/1
100 TABLET ORAL DAILY
COMMUNITY

## 2025-05-17 RX ORDER — ALBUTEROL SULFATE 0.83 MG/ML
2.5 SOLUTION RESPIRATORY (INHALATION) EVERY 4 HOURS PRN
COMMUNITY

## 2025-05-17 RX ORDER — OMEGA-3-ACID ETHYL ESTERS 1 G/1
2 CAPSULE, LIQUID FILLED ORAL DAILY
COMMUNITY

## 2025-05-17 RX ORDER — ATORVASTATIN CALCIUM 40 MG/1
40 TABLET, FILM COATED ORAL
Status: DISCONTINUED | OUTPATIENT
Start: 2025-05-17 | End: 2025-05-22 | Stop reason: HOSPADM

## 2025-05-17 RX ORDER — METOPROLOL SUCCINATE 25 MG/1
25 TABLET, EXTENDED RELEASE ORAL DAILY
Status: DISCONTINUED | OUTPATIENT
Start: 2025-05-18 | End: 2025-05-22 | Stop reason: HOSPADM

## 2025-05-17 RX ORDER — IPRATROPIUM BROMIDE AND ALBUTEROL SULFATE .5; 3 MG/3ML; MG/3ML
3 SOLUTION RESPIRATORY (INHALATION) 4 TIMES DAILY
COMMUNITY

## 2025-05-17 RX ORDER — CETIRIZINE HYDROCHLORIDE 10 MG/1
10 TABLET, CHEWABLE ORAL DAILY
COMMUNITY

## 2025-05-17 RX ORDER — METOPROLOL SUCCINATE 25 MG/1
25 TABLET, EXTENDED RELEASE ORAL DAILY
COMMUNITY

## 2025-05-17 RX ORDER — METOPROLOL TARTRATE 25 MG/1
25 TABLET, FILM COATED ORAL EVERY 12 HOURS SCHEDULED
Status: DISCONTINUED | OUTPATIENT
Start: 2025-05-17 | End: 2025-05-17

## 2025-05-17 RX ORDER — GUAIFENESIN/DEXTROMETHORPHAN 100-10MG/5
10 SYRUP ORAL 3 TIMES DAILY PRN
COMMUNITY
End: 2025-05-22

## 2025-05-17 RX ORDER — ONDANSETRON 2 MG/ML
4 INJECTION INTRAMUSCULAR; INTRAVENOUS EVERY 6 HOURS PRN
Status: DISCONTINUED | OUTPATIENT
Start: 2025-05-17 | End: 2025-05-22 | Stop reason: HOSPADM

## 2025-05-17 RX ORDER — AZITHROMYCIN 250 MG/1
500 TABLET, FILM COATED ORAL EVERY 24 HOURS
COMMUNITY
End: 2025-05-22

## 2025-05-17 RX ORDER — METOPROLOL SUCCINATE 25 MG/1
25 TABLET, EXTENDED RELEASE ORAL DAILY
Status: DISCONTINUED | OUTPATIENT
Start: 2025-05-18 | End: 2025-05-17 | Stop reason: SDUPTHER

## 2025-05-17 RX ORDER — DOXYCYCLINE 100 MG/1
100 CAPSULE ORAL EVERY 12 HOURS SCHEDULED
Status: DISCONTINUED | OUTPATIENT
Start: 2025-05-17 | End: 2025-05-19

## 2025-05-17 RX ORDER — PREDNISONE 20 MG/1
40 TABLET ORAL DAILY
COMMUNITY
End: 2025-05-22

## 2025-05-17 RX ORDER — PANTOPRAZOLE SODIUM 40 MG/1
40 TABLET, DELAYED RELEASE ORAL DAILY
COMMUNITY

## 2025-05-17 RX ORDER — FLUTICASONE PROPIONATE 50 MCG
1 SPRAY, SUSPENSION (ML) NASAL 2 TIMES DAILY
COMMUNITY

## 2025-05-17 RX ORDER — FOLIC ACID 1 MG/1
1 TABLET ORAL DAILY
COMMUNITY

## 2025-05-17 RX ORDER — BUMETANIDE 1 MG/1
0.5 TABLET ORAL DAILY
Status: DISCONTINUED | OUTPATIENT
Start: 2025-05-18 | End: 2025-05-17 | Stop reason: SDUPTHER

## 2025-05-17 RX ORDER — SENNOSIDES 8.6 MG
650 CAPSULE ORAL EVERY 6 HOURS PRN
COMMUNITY

## 2025-05-17 RX ORDER — ATORVASTATIN CALCIUM 40 MG/1
40 TABLET, FILM COATED ORAL
Status: DISCONTINUED | OUTPATIENT
Start: 2025-05-17 | End: 2025-05-17 | Stop reason: SDUPTHER

## 2025-05-17 RX ORDER — BISACODYL 5 MG/1
5 TABLET, DELAYED RELEASE ORAL DAILY PRN
COMMUNITY

## 2025-05-17 RX ORDER — FUROSEMIDE 10 MG/ML
40 INJECTION INTRAMUSCULAR; INTRAVENOUS 2 TIMES DAILY
Status: DISCONTINUED | OUTPATIENT
Start: 2025-05-17 | End: 2025-05-17

## 2025-05-17 RX ORDER — ECHINACEA PURPUREA EXTRACT 125 MG
1 TABLET ORAL AS NEEDED
COMMUNITY

## 2025-05-17 RX ORDER — LEVOFLOXACIN 5 MG/ML
750 INJECTION, SOLUTION INTRAVENOUS ONCE
Status: DISCONTINUED | OUTPATIENT
Start: 2025-05-17 | End: 2025-05-17

## 2025-05-17 RX ORDER — IPRATROPIUM BROMIDE AND ALBUTEROL SULFATE 2.5; .5 MG/3ML; MG/3ML
3 SOLUTION RESPIRATORY (INHALATION) ONCE
Status: COMPLETED | OUTPATIENT
Start: 2025-05-17 | End: 2025-05-17

## 2025-05-17 RX ORDER — ALLOPURINOL 100 MG/1
100 TABLET ORAL DAILY
Status: DISCONTINUED | OUTPATIENT
Start: 2025-05-18 | End: 2025-05-22 | Stop reason: HOSPADM

## 2025-05-17 RX ORDER — ATORVASTATIN CALCIUM 40 MG/1
40 TABLET, FILM COATED ORAL DAILY
COMMUNITY

## 2025-05-17 RX ADMIN — ATORVASTATIN CALCIUM 40 MG: 40 TABLET, FILM COATED ORAL at 20:44

## 2025-05-17 RX ADMIN — DOXYCYCLINE 100 MG: 100 CAPSULE ORAL at 20:44

## 2025-05-17 RX ADMIN — BUMETANIDE 1 MG: 0.25 INJECTION INTRAMUSCULAR; INTRAVENOUS at 16:44

## 2025-05-17 RX ADMIN — IPRATROPIUM BROMIDE AND ALBUTEROL SULFATE 3 ML: 2.5; .5 SOLUTION RESPIRATORY (INHALATION) at 17:12

## 2025-05-17 RX ADMIN — RIVAROXABAN 15 MG: 15 TABLET, FILM COATED ORAL at 20:44

## 2025-05-17 NOTE — ASSESSMENT & PLAN NOTE
Lab Results   Component Value Date    EGFR 23 05/17/2025    EGFR 39.5 05/01/2017    EGFR 39.8 04/27/2017    CREATININE 1.95 (H) 05/17/2025    CREATININE 1.31 (H) 05/01/2017    CREATININE 1.30 04/27/2017   Unclear baseline as patient has not had BMP since 2017  Creatinine 1.95 on presentation  Possibly secondary to cardiorenal syndrome in the setting of significant volume overload  IV diuresis as above  Nephrology consultation appreciated  Trend BMP  Avoid nephrotoxic agents and hypotension  Strict intake and output  Daily standing weights

## 2025-05-17 NOTE — ASSESSMENT & PLAN NOTE
Patient presents with shortness of breath and significant lower extremity edema  Chest x-ray with significant cardiomegaly and pulmonary vascular congestion  Physical exam with significant lower extremity pitting edema  NT-proBNP and troponin I pending upon admission  Initiate Lasix 50 mg IV 2 times daily  Check 2D echocardiogram  Monitor on telemetry  Follow-up troponin I however POA not interested in cardiac catheterization or ischemic evaluation  Cardiology consultation appreciated  Continue home beta-blocker  Currently level 3 DNR/DNI  May need goals of care discussions if patient does not improve clinically

## 2025-05-17 NOTE — ASSESSMENT & PLAN NOTE
Hemoglobin 6.6 on presentation  No signs of active bleeding  Patient does have history of iron deficiency anemia  Likely anemia of chronic kidney disease superimposed with dilution in the setting of significant volume overload  Transfuse 2 units of packed red blood cells  Check iron panel  Trend CBC  Monitor for signs of active bleeding

## 2025-05-17 NOTE — ED ATTENDING ATTESTATION
5/17/2025  I, Anthony Amaro DO, saw and evaluated the patient. I have discussed the patient with the resident/non-physician practitioner and agree with the resident's/non-physician practitioner's findings, Plan of Care, and MDM as documented in the resident's/non-physician practitioner's note, except where noted. All available labs and Radiology studies were reviewed.  I was present for key portions of any procedure(s) performed by the resident/non-physician practitioner and I was immediately available to provide assistance.       At this point I agree with the current assessment done in the Emergency Department.  I have conducted an independent evaluation of this patient a history and physical is as follows:  Patient with rales bilaterally, bilateral edema.  Exam consistent with CHF exacerbation.  Patient with persistent cough, slight uncomfortable.  Patient requires IV diuresis to prevent respiratory failure and collapse.  Additionally patient found to be acute on chronically anemic, will transfuse as she has evidence of endorgan damage and CHF exacerbation.    Mild lactic acidosis likely related to respiratory and not due to infection    ED Course  ED Course as of 05/17/25 1804   Sat May 17, 2025   1615 Critically low hemoglobin; will transfuse. Patient at risk hypoxia, worsening heart failure   1633 Will require admission for anemia, CHF exacerbation   1740 Patient acutely volume overloaded which is her primary diagnosis.  Incidentally found to have possible urinary tract infection.  Vital signs likely abnormal due to CHF exacerbation however did not meet SIRS criteria and there is evidence of infection.  I do not believe her SIRS criteria is related to her infection.  Patient clinically is not septic.  Will withhold any fluid outside of medications/flushes as she is in acute CHF exacerbation and this would be actively harmful to the patient   1803 Critical Care Time Statement: Upon my evaluation, this  patient had a high probability of imminent or life-threatening deterioration due to anemia, respiratory failure, anemia which required my direct attention, intervention, and personal management.  I spent a total of 43 minutes directly providing critical care services, including interpretation of complex medical databases, evaluating for the presence of life-threatening injuries or illnesses, management of organ system failure(s) , complex medical decision making (to support/prevent further life-threatening deterioration)., interpretation of hemodynamic data, and blood transfusion. This time is exclusive of procedures, teaching, treating other patients, family meetings, and any prior time recorded by providers other than myself.             Critical Care Time  Procedures

## 2025-05-17 NOTE — PLAN OF CARE
Problem: PAIN - ADULT  Goal: Verbalizes/displays adequate comfort level or baseline comfort level  Description: Interventions:  - Encourage patient to monitor pain and request assistance  - Assess pain using appropriate pain scale  - Administer analgesics as ordered based on type and severity of pain and evaluate response  - Implement non-pharmacological measures as appropriate and evaluate response  - Consider cultural and social influences on pain and pain management  - Notify physician/advanced practitioner if interventions unsuccessful or patient reports new pain  - Educate patient/family on pain management process including their role and importance of  reporting pain   - Provide non-pharmacologic/complimentary pain relief interventions  Outcome: Progressing     Problem: INFECTION - ADULT  Goal: Absence of fever/infection during neutropenic period  Description: INTERVENTIONS:  - Monitor WBC  - Perform strict hand hygiene  - Limit to healthy visitors only  - No plants, dried, fresh or silk flowers with asif in patient room  Outcome: Progressing     Problem: SAFETY ADULT  Goal: Maintain or return to baseline ADL function  Description: INTERVENTIONS:  -  Assess patient's ability to carry out ADLs; assess patient's baseline for ADL function and identify physical deficits which impact ability to perform ADLs (bathing, care of mouth/teeth, toileting, grooming, dressing, etc.)  - Assess/evaluate cause of self-care deficits   - Assess range of motion  - Assess patient's mobility; develop plan if impaired  - Assess patient's need for assistive devices and provide as appropriate  - Encourage maximum independence but intervene and supervise when necessary  - Involve family in performance of ADLs  - Assess for home care needs following discharge   - Consider OT consult to assist with ADL evaluation and planning for discharge  - Provide patient education as appropriate  - Monitor functional capacity and physical  performance, use of AM PAC & JH-HLM   - Monitor gait, balance and fatigue with ambulation    Outcome: Progressing     Problem: Knowledge Deficit  Goal: Patient/family/caregiver demonstrates understanding of disease process, treatment plan, medications, and discharge instructions  Description: Complete learning assessment and assess knowledge base.  Interventions:  - Provide teaching at level of understanding  - Provide teaching via preferred learning methods  Outcome: Progressing

## 2025-05-17 NOTE — H&P
[facility]    Medicine Progress Note - Hospitalist Service    Date of Admission:  1/21/2023    Assessment & Plan   Blanco Osborne is a 59 year-old male admitted on 1/21/2023 as a transfer from [facility] for evaluation of loculated pleural effusion. He has extensive PMH including h/o liver transplant 2016 due to alcohol abuse, pAF on chronic anticoagulation, history of diabetes mellitus type 2, CVA, hypertension, CHRISTIAN on BiPAP.  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  He was recovering in [facility] and was transferred to [facility] for consideration of chest tube placement and possible intrapleural lysis for worsening effusion.    Acute metabolic encephalopathy with delirium, multifactorial  Hepatic encephalopathy  Chronic liver disease, history of liver transplant 2016  End-stage renal disease (ESRD), on hemodialysis (HD)  History of seizure, on Keppra and Vimpat  Waxing and waning mentation, coinciding with lactulose being held at [facility]  Earlier in hospital stay, remained confused and had pulled out tracheostomy tube, PICC line and pulled his dialysis catheter.  Lines replaced.  Palliative care consult 1/26, see note.  * Psychiatry consulted 2/2, see note, follow-up requested.    On/off fluctuating mentation, recently improving 2/17, 2/18    Continue to reorient as needed; maintain normal day/night, sleep wake cycles; minimize sedating/altering medications as able    Continue Lactulose to 10 mg BID, monitor for symptoms to have 2-3 Bms    Zyprexa 5 mg at bedtime scheduled.  Melatonin 3 mg p.o. at bedtime scheduled    Olanzapine BID as needed for severe agitation or anxiety; monitor for side effets    Mittens/sitter as needed; minimize use of restraints as able    Ongoing  H&P - Hospitalist   Name: Riri Cuellar 85 y.o. female I MRN: [id number]  Unit/Bed#: -01 I Date of Admission: 5/17/2025   Date of Service: 5/17/2025 I Hospital Day: 0     Assessment & Plan  Acute on chronic diastolic congestive heart failure (HCC)  Patient presents with shortness of breath and significant lower extremity edema  Chest x-ray with significant cardiomegaly and pulmonary vascular congestion  Physical exam with significant lower extremity pitting edema  NT-proBNP and troponin I pending upon admission  Initiate Lasix 50 mg IV 2 times daily  Check 2D echocardiogram  Monitor on telemetry  Follow-up troponin I however POA not interested in cardiac catheterization or ischemic evaluation  Cardiology consultation appreciated  Continue home beta-blocker  Currently level 3 DNR/DNI  May need goals of care discussions if patient does not improve clinically  Anemia  Hemoglobin 6.6 on presentation  No signs of active bleeding  Patient does have history of iron deficiency anemia  Likely anemia of chronic kidney disease superimposed with dilution in the setting of significant volume overload  Transfuse 2 units of packed red blood cells  Check iron panel  Trend CBC  Monitor for signs of active bleeding  Stage 4 chronic kidney disease (HCC)  Lab Results   Component Value Date    EGFR 23 05/17/2025    EGFR 39.5 05/01/2017    EGFR 39.8 04/27/2017    CREATININE 1.95 (H) 05/17/2025    CREATININE 1.31 (H) 05/01/2017    CREATININE 1.30 04/27/2017   Unclear baseline as patient has not had BMP since 2017  Creatinine 1.95 on presentation  Possibly secondary to cardiorenal syndrome in the setting of significant volume overload  IV diuresis as above  Nephrology consultation appreciated  Trend BMP  Avoid nephrotoxic agents and hypotension  Strict intake and output  Daily standing weights  Paroxysmal atrial fibrillation (HCC)  Heart rates well-controlled  Continue home beta-blocker for rate control and Xarelto for CVA  hemodialysis, as per nephrology, on M-W-F dialysis schedule; ongoing nephrology consult follow-up appreciated    Dialysis labs as per nephrology    Vitamin D levelwas ordered per family request; nephrology managing and treating    Psychiatry consult follow-up requested, last visit 2/2, help appreciated    2/20/23Off restraints.Mentation has improved     Bilateral pleural effusions   Acute respiratory failure requiring tracheostomy  - resolved    Pneumonia  - resolved   ARDS  - resolved    Right pneumothorax - resolved   *Initial event was parainfluenza and strep pneumo pneumonia back in November 2022. Treated for ARDS. Had failed extubation x2 and tracheostomy performed on previous hospitalization. *Had additional complications of bilateral pneumothoraces as well as hydropneumothorax- pleural fluid grew candida parapsilosis  *Completed 4-week antifungal treatment. [facility] from [facility] ventilator.  *Recently there were concern for worsening effusion while at [facility] and had thoracentesis 01/13 which returned exudative without growth on cultures. CT chest/abdomen/pelvis on 01/18 demonstrated worsening effusions and concerns for infected parapneumonic effusions with possible SBP.  Multiple pulmonologist at [facility] reviewed CT scan recommended transfer to [facility] for consideration of chest tube placement and possible intrapleural lysis  *Thoracic surgery (Dr. Jackson) consulted during admission   *CT chest 1/22, small effusions on imaging and so chest tube was not inserted.   ID consulted, see note 2/10.  *Patient pulled out his tracheostomy tube on the night 2/1-2/2 and is tolerating well without increased shortness of breath persistent cough or worsening hypoxia  * Chest x-ray 2/3 with cardiomegaly, chronic small bilateral pleural effusions, no pulmonary edema; right internal jugular dialysis catheter in place    Monitor respiratory status, recently stable on room air    Encourage incentive  prophylaxis  Monitor heart rates  Gastroesophageal reflux disease without esophagitis  Continue home PPI  Rheumatoid arthritis (HCC)  History of RA on methotrexate  Outpatient follow-up with Rheumatology    VTE Pharmacologic Prophylaxis: VTE Score: 6 High Risk (Score >/= 5) - Pharmacological DVT Prophylaxis Ordered: rivaroxaban (Xarelto). Sequential Compression Devices Ordered.  Code Status: Level 3 - DNAR and DNI  Discussion with family: Patient declined call to .     Anticipated Length of Stay: Patient will be admitted on an inpatient basis with an anticipated length of stay of greater than 2 midnights secondary to acute on chronic congestive heart failure exacerbation.    History of Present Illness   Chief Complaint: Shortness of breath and lower extremity edema    Riri Cuellar is a 85 y.o. female with a PMH of paroxysmal atrial fibrillation on Xarelto, GERD, RA who presents with shortness of breath and lower extremity edema.  Upon chart review it appears that the patient has not had labs or imaging since 2017.  She states that over the course of the past few days she has become significantly short of breath and states that her legs are beginning to swell.  In the ED, the patient was found to be hemodynamically stable and saturating well on room air.  Hemoglobin 6.6.  No signs of active bleeding.  ED provider ordered 2 units of packed red blood cells.  Chest x-ray with significant cardiomegaly and pulmonary vascular congestion.  NT-proBNP and troponin I pending upon admission.    Review of Systems   Constitutional:  Negative for chills and fever.   HENT:  Negative for ear pain and sore throat.    Eyes:  Negative for pain and visual disturbance.   Respiratory:  Positive for shortness of breath. Negative for cough.    Cardiovascular:  Positive for leg swelling. Negative for chest pain and palpitations.   Gastrointestinal:  Negative for abdominal pain and vomiting.   Genitourinary:  Negative for  "spirometry as able    Wound care previously consulted for tracheostomy site care, monitor for signs and symptoms of infection  2/20/23 Stable on RA     S/p liver transplant 2016   Chronic immunosuppression, on tacrolimus  Cirrhosis with ascites  Subacute bacterial peritonitis (SBP), resolved  *Main manifestations of this are hepatic encephalopathy and ascites.  Hepatologist at [place] felt that most likely reason for the cirrhosis was metabolic syndrome or alcohol intake.  There was no evidence of rejection on biopsy and there was some evidence of regeneration. Paracentesis done on 12/22/2022 showed lactobacillus indicating SBP, treated with Unasyn and Augmentin, finished 2-week course 1/12/2023.  Requires large-volume paracentesis periodically for recurring ascites.    *Paracentesis 1/13/2023 yielded about 7500 cc. Cultures NGTD.  Most recent paracentesis on 1/24 with 4.8 L fluid removal    Continue intermittent paracentesis as needed if develops symptomatic ascites    Monitor for signs and symptoms of recurrent spontaneous bacterial peritonitis     Further treatment for recurrent ascites with TIPS deferred to his Liver Transplant team and/or Hepatology, he has an appointment on 4/21/2023 with Hepatology, Dr. Simms    Continue Tacrolimus 1 mg BID.    Continue rifaximin 550 mg BID    Continue lactulose as ordered, titrate as needed to have 2-3 bms    GI consult as needed in hospital for new acute issues, otherwise as outpatient    Goals of care   As per prior rounding provider, \"on 1/25 nursing had mentioned that patient had refused to undergo dialysis and want to stop care, palliative care was consulted.  Patient and his spouse denied wanting to stop care and wanted ongoing restorative care including full code\"    Monitor, Full Code status     Diarrhea, improved, on lactulose for chronic liver disease  - C difficile negative on 1/31. Secondary to lactulose.  - discontinued rectal tube (2/12) as stools more " dysuria and hematuria.   Musculoskeletal:  Negative for arthralgias and back pain.   Skin:  Negative for color change and rash.   Neurological:  Negative for seizures and syncope.   All other systems reviewed and are negative.      Historical Information   History reviewed. No pertinent past medical history.  History reviewed. No pertinent surgical history.  Social History     Tobacco Use    Smoking status: Never    Smokeless tobacco: Never   Substance and Sexual Activity    Alcohol use: Never    Drug use: Never    Sexual activity: Not on file     E-Cigarette/Vaping     E-Cigarette/Vaping Substances     Meds/Allergies   I have reviewed home medications using recent Epic encounter.  Prior to Admission medications    Medication Sig Start Date End Date Taking? Authorizing Provider   albuterol (2.5 mg/3 mL) 0.083 % nebulizer solution Take 2.5 mg by nebulization every 4 (four) hours as needed for wheezing or shortness of breath   Yes Historical Provider, MD   allopurinol (ZYLOPRIM) 100 mg tablet Take 100 mg by mouth daily   Yes Historical Provider, MD   atorvastatin (LIPITOR) 40 mg tablet Take 40 mg by mouth daily   Yes Historical Provider, MD   azithromycin (ZITHROMAX) 250 mg tablet Take 500 mg by mouth every 24 hours Starting 5/17/25  for 4 day course   Yes Historical Provider, MD   budesonide-formoterol (SYMBICORT) 160-4.5 mcg/act inhaler Inhale 2 puffs 2 (two) times a day Rinse mouth after use.   Yes Historical Provider, MD   cetirizine (ZyrTEC) 10 MG chewable tablet Chew 10 mg before bedtime.   Yes Historical Provider, MD   Cholecalciferol (VITAMIN D3) 1,000 units tablet Take 1,000 Units by mouth daily   Yes Historical Provider, MD   dextromethorphan-guaiFENesin (ROBITUSSIN DM)  mg/5 mL syrup Take 10 mL by mouth 3 (three) times a day as needed for cough   Yes Historical Provider, MD   fluticasone (FLONASE) 50 mcg/act nasal spray 1 spray into each nostril in the morning and 1 spray before bedtime.   Yes  Historical Provider, MD   folic acid (FOLVITE) 1 mg tablet Take 1 mg by mouth daily   Yes Historical Provider, MD   ipratropium-albuterol, FOR EMS ONLY, (DUO-NEB) 0.5-2.5 mg/3 mL nebulizer solution Use 3 mL 4 (four) times a day   Yes Historical Provider, MD   metoprolol succinate (TOPROL-XL) 25 mg 24 hr tablet Take 25 mg by mouth daily   Yes Historical Provider, MD   omega-3-acid ethyl esters (LOVAZA) 1 g capsule Take 2 g by mouth in the morning.   Yes Historical Provider, MD   pantoprazole (PROTONIX) 40 mg tablet Take 40 mg by mouth in the morning.   Yes Historical Provider, MD   predniSONE 20 mg tablet Take 40 mg by mouth in the morning. Taper started on 5/14/25.  40 mg for 4 days, 20 mg for 4 days, 10 mg for 2 days.  Given for cough.   Yes Historical Provider, MD   rivaroxaban (XARELTO) 15 mg tablet Take 15 mg by mouth in the evening. Take before meals   Yes Historical Provider, MD   sodium chloride (OCEAN) 0.65 % nasal spray 1 spray into each nostril as needed for congestion   Yes Historical Provider, MD   Tocilizumab (Actemra ACTPen) 162 MG/0.9ML SOAJ Inject 162 mg under the skin once a week On mondays   Yes Historical Provider, MD   acetaminophen (TYLENOL) 650 mg CR tablet Take 650 mg by mouth every 6 (six) hours as needed for mild pain (fever)    Historical Provider, MD   bisacodyl 5 mg EC tablet Take 5 mg by mouth daily as needed for constipation    Historical Provider, MD     Allergies   Allergen Reactions    Penicillins Anaphylaxis       Objective :  Temp:  [97.4 °F (36.3 °C)-98.3 °F (36.8 °C)] 97.7 °F (36.5 °C)  HR:  [] 103  BP: ()/(53-73) 99/73  Resp:  [17-29] 20  SpO2:  [95 %-100 %] 99 %  O2 Device: None (Room air)    Physical Exam  Vitals and nursing note reviewed.   Constitutional:       General: She is not in acute distress.     Appearance: She is well-developed.   HENT:      Head: Normocephalic and atraumatic.     Eyes:      Conjunctiva/sclera: Conjunctivae normal.       Cardiovascular:  formed    Continue lactulose with goal of 2 to 3 soft bowel movement in 24 hours     Paroxysmal atrial fibrillation  Chronic anticoagulation, apixaban  Remains in sinus rhythm, on anticoagulation with apixaban indefinitely for stroke prophylaxis.      Monitor rhythm    Continue apixaban anticoagulation    Monitor hemoglobin, see below     Acute anemia  Pt has required intermittent transfusions for on-going anemia.  Anemia most likely secondary to critical illness/chronic disease, chronic renal disease.  Baseline hemoglobin around 7-8    Transfuse packed red blood cells to keep hemoglobin > 7    Hemoglobin 8.1 on 2/17    Epoetin lfa-epbx use as per nephrology    Will rechecl Hb tomorrow     History PEA arrest   PEA arrest prior to his previous admission and 1 episode of PEA associated with desaturation on 12/20.  No structural cardiac disease.     Stable, continue cardiac Monitoring     Chronic Hypotension  In the past has developed baseline hypotension with cirrhosis and prolonged critical illness.  On hemodialysis.    Continue midodrine, as per nephrology      History of seizure   After hypoxic event, in the context of baseline multifactorial encephalopathy secondary to his ongoing critical illness and prior cardiac arrests and prior strokes and cirrhosis with hepatic encephalopathy. MRI of head of 12/20/22 reveals no PRES or leptomeningeal enhancement but scattered.  HHV6+ in CSF is regarded by neurology as unlikely to be a pathogen and Gancyclovir was stopped.   No recent seizure activity.    Continue levetiracetam, lacosamide     Seizure precautions    Outpatient follow-up with neurology, consult inpatient if needed      Diabetes mellitus type 2  *Hemoglobin A1c on November 2022 was 4.7  *By report, on Lantus insulin in the past.  Blood sugar checks and sliding scale insulin previously discontinued as has been stable without insulin needs    Monitor with periodic blood sugar checks as needed     Severe  "     Rate and Rhythm: Normal rate and regular rhythm.      Heart sounds: No murmur heard.  Pulmonary:      Effort: Pulmonary effort is normal. No respiratory distress.      Breath sounds: Rales present.   Abdominal:      Palpations: Abdomen is soft.      Tenderness: There is no abdominal tenderness.     Musculoskeletal:         General: No swelling.      Cervical back: Neck supple.      Right lower leg: Edema present.      Left lower leg: Edema present.     Skin:     General: Skin is warm and dry.      Capillary Refill: Capillary refill takes less than 2 seconds.     Neurological:      Mental Status: She is alert.     Psychiatric:         Mood and Affect: Mood normal.          Lines/Drains:  Lines/Drains/Airways       Active Status       Name Placement date Placement time Site Days    External Urinary Catheter 05/17/25  1653  -- less than 1                          Lab Results: I have reviewed the following results:  Results from last 7 days   Lab Units 05/17/25  1551   WBC Thousand/uL 7.82   HEMOGLOBIN g/dL 6.6*   HEMATOCRIT % 23.5*   PLATELETS Thousands/uL 190   SEGS PCT % 94*   LYMPHO PCT % 4*   MONO PCT % 2*   EOS PCT % 0     Results from last 7 days   Lab Units 05/17/25  1551   SODIUM mmol/L 140   POTASSIUM mmol/L 4.0   CHLORIDE mmol/L 109*   CO2 mmol/L 21   BUN mg/dL 47*   CREATININE mg/dL 1.95*   ANION GAP mmol/L 10   CALCIUM mg/dL 8.6   ALBUMIN g/dL 4.0   TOTAL BILIRUBIN mg/dL 0.63   ALK PHOS U/L 63   ALT U/L 20   AST U/L 29   GLUCOSE RANDOM mg/dL 178*     Results from last 7 days   Lab Units 05/17/25  1551   INR  1.65*         No results found for: \"HGBA1C\"  Results from last 7 days   Lab Units 05/17/25  1551   LACTIC ACID mmol/L 2.1*   PROCALCITONIN ng/ml 0.06       Imaging Results Review: I reviewed radiology reports from this admission including: chest xray.  Other Study Results Review: No additional pertinent studies reviewed.    Administrative Statements   I have spent a total time of 65 minutes in " malnutrition, protein and calorie type  Moderate dysphagia  G-tube feedings    Continue with tube feeds via PEG tube    IR replaced the PEG tube on 2/8/2023, monitor    Nutritionist consulted and following for tube feeds    SLP following as well    Oral diet as per speech and language therapy (SLP)     2/20 Nutrition following for tube feeds     Anxiety  Fluoxetine was discontinued as per psychiatry recommendation on 2/2 (see consult note for details)     Stable, monitor; comfort and reassurance offered during visit    Psychiatry follow-up     Disposition     Estimated length of stay several days    Anticipated discharge to transitional care unit (TCU), long-term care, or [facility]; social work consulted, help appreciated; previously at [facility] and by report not able to return there (note 2/10)     Diet: Room Service  Soft & Bite Sized Diet (level 6) Liquidized/Moderately Thick (level 3) (By tsp only); No Straws  Adult Formula Bolus Feeding: Novasource Renal; Route: Gastrostomy; 3 times daily; Volume per Bolus: 240; mL(s); EVERYDAY please bolus at 80 mL/hr x3 hrs at 6pm. Add additional bolus feedings x2 at 80 mL/hr x 3 hrs as back up bolus feeding after br...  Snacks/Supplements Adult: Other; Gelatein+ with lunch and magic cup with dinner (RD); With Meals    DVT Prophylaxis: DOAC  Nixon Catheter: Not present  Lines: PRESENT      CVC Double Lumen Right External jugular Tunneled-Site Assessment: WDL      Cardiac Monitoring: None  Code Status: Full Code      Clinically Significant Risk Factors              # Hypoalbuminemia: Lowest albumin = 1.9 g/dL at 1/23/2023  6:38 AM, will monitor as appropriate            # Severe Malnutrition: based on nutrition assessment        Disposition Plan      Expected Discharge Date: 02/21/2023, 12:00 PM    Destination: inpatient rehabilitation facility  Discharge Comments: admitted on 1-21 from [facility], Dialysis pt MWF. Will need placement          Ashkan Hernandez,  MD  Hospitalist Service  [facility]  Securely message with Matth[employer] (more info)  Text page via [employer] Paging/Directory   ______________________________________________________________________    Interval History   Improved mentation  Has intermitted abdominal pain    Physical Exam   Vital Signs: Temp: 98.3  F (36.8  C) Temp src: Oral BP: 105/66 Pulse: 84   Resp: 18 SpO2: 96 % O2 Device: None (Room air)    Weight: 193 lbs 1.97 oz    Physical Exam  Cardiovascular:      Rate and Rhythm: Normal rate and regular rhythm.      Heart sounds: Normal heart sounds.   Pulmonary:      Effort: Pulmonary effort is normal. No respiratory distress.   Abdominal:      General: There is distension.      Palpations: Abdomen is soft.   Musculoskeletal:      Right lower leg: No edema.      Left lower leg: No edema.   Neurological:      Mental Status: He is alert.       Medical Decision Making             Data     I have personally reviewed the following data over the past 24 hrs:    N/A  \   N/A   / N/A     134 (L) 95 (L) 55.0 (H) /  90   5.0 29 4.58 (H) \       Imaging results reviewed over the past 24 hrs:   No results found for this or any previous visit (from the past 24 hour(s)).   Creatinine   Date Value Ref Range Status   02/20/2023 4.58 (H) 0.67 - 1.17 mg/dL Final   04/20/2020 1.06 0.66 - 1.25 mg/dL Final     Recent Labs   Lab 02/17/23  0808   HGB 8.1*      caring for this patient on the day of the visit/encounter including Diagnostic results, Prognosis, Risks and benefits of tx options, Instructions for management, Patient and family education, Importance of tx compliance, Risk factor reductions, Impressions, Counseling / Coordination of care, Documenting in the medical record, Reviewing/placing orders in the medical record (including tests, medications, and/or procedures), Obtaining or reviewing history  , and Communicating with other healthcare professionals .    ** Please Note: This note has been constructed using a voice recognition system. **

## 2025-05-17 NOTE — ED PROVIDER NOTES
Time reflects when diagnosis was documented in both MDM as applicable and the Disposition within this note       Time User Action Codes Description Comment    5/17/2025  4:52 PM Shirley, Juan A Add [R05.9] Cough     5/17/2025  5:38 PM Shirley, Juan A Add [D64.9] Anemia     5/17/2025  5:38 PM Shirley, Juan A Add [I50.9] CHF exacerbation (HCC)     5/17/2025  5:48 PM Yorty, Juan A Add [I50.33] Acute on chronic diastolic congestive heart failure (HCC)     5/17/2025  5:51 PM Yorty, Juan A Modify [R05.9] Cough     5/17/2025  5:51 PM Yorty, Juan A Modify [D64.9] Anemia     5/17/2025  5:51 PM Yorty, Juan A Modify [I50.9] CHF exacerbation (HCC)     5/17/2025  5:51 PM Yorty, Juan A Modify [I50.33] Acute on chronic diastolic congestive heart failure (HCC)     5/17/2025  6:19 PM Yorty, Juan A Add [N18.4] Stage 4 chronic kidney disease (HCC)           ED Disposition       ED Disposition   Admit    Condition   Stable    Date/Time   Sat May 17, 2025  5:38 PM    Comment   Case was discussed with Dr. Baum and the patient's admission status was agreed to be Admission Status: inpatient status to the service of Dr. Fowler   .               Assessment & Plan       Medical Decision Making   85 y.o.  female with a history significant for A-fib on Eliquis, hypertension, hyperlipidemia, aortic stenosis, systolic heart failure, CKD, presents to ED with complaint worsening cough and epigastric pain over the course of the past 3 weeks. Patient reports that epigastric pain worsens during coughing fits.  On evaluation patient is well appearing, with unstable vital signs. Exam significant for rhonchorous breath sounds, tender epigastrium, diffusely swollen bilateral lower extremities.      Differential Diagnosis: Includes but is not limited to acute on chronic CHF exacerbation, acute viral syndrome, pulmonary edema,    Evaluation and Management: (see ED course for additioinal details)  - Labs: Cbc, cmp, bnp, procal, trop,  flu/covid/rsv, lactic, blood cultures, ua  - Imaging: CXR  - Interventions: 2 units prbc    Clinical impression is most consistent with acute CHF exacerbation  Plan and Disposition:   - Admission to medicine for treatment of CHF exacerbation  - Reviewed diagnosis, treatment plan, and expectant course  - Verbal and written instructions given for outpatient follow up   - Discussed reasons to Return to ED.      Amount and/or Complexity of Data Reviewed  Labs: ordered. Decision-making details documented in ED Course.  Radiology: ordered and independent interpretation performed.    Risk  Prescription drug management.  Decision regarding hospitalization.        ED Course as of 05/17/25 1840   Sat May 17, 2025   1623 Patient requested us to reach out to daughter to update her regarding care. Spoke with daughter camila who is now on her way to the campus.    1626 LACTIC ACID(!): 2.1   1752 Bacteria, UA(!): Innumerable  Ordered 750 levaquin for coverage         Medications   ondansetron (ZOFRAN) injection 4 mg (has no administration in time range)   furosemide (LASIX) injection 40 mg (has no administration in time range)   rivaroxaban (XARELTO) tablet 15 mg (has no administration in time range)   pantoprazole (PROTONIX) EC tablet 40 mg (has no administration in time range)   metoprolol succinate (TOPROL-XL) 24 hr tablet 25 mg (has no administration in time range)   atorvastatin (LIPITOR) tablet 40 mg (has no administration in time range)   bumetanide (BUMEX) injection 1 mg (1 mg Intravenous Given 5/17/25 1644)   ipratropium-albuterol (DUO-NEB) 0.5-2.5 mg/3 mL inhalation solution 3 mL (3 mL Nebulization Given 5/17/25 1712)       ED Risk Strat Scores      HEART Risk Score      Flowsheet Row Most Recent Value   Heart Score Risk Calculator    History 0 Filed at: 05/17/2025 1838   ECG 1 Filed at: 05/17/2025 1838   Age 2 Filed at: 05/17/2025 1838   Risk Factors 2 Filed at: 05/17/2025 1838   Troponin --   HEART Score --                       No data recorded        SBIRT 20yo+      Flowsheet Row Most Recent Value   Initial Alcohol Screen: US AUDIT-C     1. How often do you have a drink containing alcohol? 0 Filed at: 05/17/2025 8548   2. How many drinks containing alcohol do you have on a typical day you are drinking?  0 Filed at: 05/17/2025 1542   3a. Male UNDER 65: How often do you have five or more drinks on one occasion? 0 Filed at: 05/17/2025 1302   3b. FEMALE Any Age, or MALE 65+: How often do you have 4 or more drinks on one occassion? 0 Filed at: 05/17/2025 1542   Audit-C Score 0 Filed at: 05/17/2025 1542   PAT: How many times in the past year have you...    Used an illegal drug or used a prescription medication for non-medical reasons? Never Filed at: 05/17/2025 7142                            History of Present Illness       Chief Complaint   Patient presents with    Shortness of Breath     Pt arrives via EMS coming from nursing home c/o of SOB; recently dx with pneumonia       History reviewed. No pertinent past medical history.   History reviewed. No pertinent surgical history.   History reviewed. No pertinent family history.   Social History[1]   E-Cigarette/Vaping      E-Cigarette/Vaping Substances      I have reviewed and agree with the history as documented.     Patient is a 85-year-old female with past medical history of aortic stenosis, A-fib, GERD, endocarditis, iron-deficiency anemia, hyperlipidemia CKD, ASCVD, systolic heart failure, presenting with a 3-week history of cough, upper respiratory epigastric pain, after transport [facility] [facility] and treatment.  Per EMS description and review of unclear documentation patient was evaluated at facility by onsite provider who diagnosed patient with started her on course of azithromycin, steroids, and Symbicort.  On evaluation patient endorses persistent cough, shortness of breath, and epigastric pain that is worsened with coughing  episodes, denies fever, chills, nausea, vomiting, diarrhea.      Shortness of Breath  Associated symptoms: abdominal pain and cough    Associated symptoms: no fever, no headaches, no neck pain and no vomiting        Review of Systems   Constitutional:  Negative for chills, fatigue and fever.   Respiratory:  Positive for cough and shortness of breath. Negative for chest tightness.    Cardiovascular:  Negative for palpitations.   Gastrointestinal:  Positive for abdominal pain. Negative for diarrhea, nausea and vomiting.   Musculoskeletal:  Negative for myalgias and neck pain.   Neurological:  Negative for weakness, numbness and headaches.   All other systems reviewed and are negative.          Objective       ED Triage Vitals   Temperature Pulse Blood Pressure Respirations SpO2 Patient Position - Orthostatic VS   05/17/25 1543 05/17/25 1542 05/17/25 1544 05/17/25 1542 05/17/25 1543 05/17/25 1542   (!) 97.4 °F (36.3 °C) 71 121/54 22 98 % Sitting      Temp Source Heart Rate Source BP Location FiO2 (%) Pain Score    05/17/25 1543 05/17/25 1542 05/17/25 1542 -- 05/17/25 1542    Temporal Monitor Right arm  7      Vitals      Date and Time Temp Pulse SpO2 Resp BP Pain Score FACES Pain Rating User   05/17/25 1753 98.3 °F (36.8 °C) 94 95 % 26 114/73 -- --    05/17/25 1738 98.2 °F (36.8 °C) 95 99 % 17 122/57 -- --    05/17/25 1734 98.2 °F (36.8 °C) 90 100 % 20 122/57 -- -- AP   05/17/25 1726 -- 93 100 % 22 -- -- -- AP   05/17/25 1716 -- 98 100 % 29 -- -- --    05/17/25 1700 -- 96 99 % 22 121/53 -- -- AP   05/17/25 1647 -- 95 97 % 19 113/70 -- -- AP   05/17/25 1545 -- 96 -- -- -- -- -- TAB   05/17/25 1544 -- -- -- -- 121/54 -- -- TAB   05/17/25 1543 97.4 °F (36.3 °C) -- 98 % -- -- -- -- TAB   05/17/25 1542 -- 71 -- 22 -- 7 -- TAB            Physical Exam  Vitals and nursing note reviewed.   Constitutional:       General: She is not in acute distress.     Appearance: She is not ill-appearing.   HENT:      Head:  Normocephalic and atraumatic.     Eyes:      Pupils: Pupils are equal, round, and reactive to light.       Cardiovascular:      Rate and Rhythm: Tachycardia present. Rhythm irregular.   Pulmonary:      Effort: Pulmonary effort is normal.      Breath sounds: Rhonchi and rales present.   Abdominal:      General: Abdomen is flat.     Musculoskeletal:         General: No swelling or tenderness.     Skin:     General: Skin is warm and dry.     Neurological:      General: No focal deficit present.      Mental Status: Mental status is at baseline.     Psychiatric:         Mood and Affect: Mood normal.         Behavior: Behavior normal.         Results Reviewed       Procedure Component Value Units Date/Time    Urine Microscopic [[id number]]  (Abnormal) Collected: 05/17/25 1713    Lab Status: Final result Specimen: Urine, Clean Catch Updated: 05/17/25 1737     RBC, UA 0-1 /hpf      WBC, UA 30-50 /hpf      Epithelial Cells Occasional /hpf      Bacteria, UA Innumerable /hpf     Urine culture [[id number]] Collected: 05/17/25 1713    Lab Status: In process Specimen: Urine, Clean Catch Updated: 05/17/25 1737    UA w Reflex to Microscopic w Reflex to Culture [[id number]]  (Abnormal) Collected: 05/17/25 1713    Lab Status: Final result Specimen: Urine, Clean Catch Updated: 05/17/25 1721     Color, UA Yellow     Clarity, UA Clear     Specific Gravity, UA 1.015     pH, UA 6.0     Leukocytes, UA 1+     Nitrite, UA Negative     Protein, UA Negative mg/dl      Glucose, UA Negative mg/dl      Ketones, UA Negative mg/dl      Urobilinogen, UA 0.2 E.U./dl      Bilirubin, UA Negative     Occult Blood, UA Trace-Intact    Smear Review(Phlebs Do Not Order) [[id number]] Collected: 05/17/25 1551    Lab Status: Final result Specimen: Blood from Arm, Right Updated: 05/17/25 1647     RBC Morphology Present     Platelet Estimate Adequate     Anisocytosis Present     Poikilocytes Present    CBC and differential [[id number]]  (Abnormal) Collected: 05/17/25  1551    Lab Status: Final result Specimen: Blood from Arm, Right Updated: 05/17/25 1647     WBC 7.82 Thousand/uL      RBC 2.32 Million/uL      Hemoglobin 6.6 g/dL      Hematocrit 23.5 %       fL      MCH 28.4 pg      MCHC 28.1 g/dL      RDW 23.7 %      MPV 10.9 fL      Platelets 190 Thousands/uL      nRBC 0 /100 WBCs      Segmented % 94 %      Immature Grans % 0 %      Lymphocytes % 4 %      Monocytes % 2 %      Eosinophils Relative 0 %      Basophils Relative 0 %      Absolute Neutrophils 7.29 Thousands/µL      Absolute Immature Grans 0.03 Thousand/uL      Absolute Lymphocytes 0.34 Thousands/µL      Absolute Monocytes 0.14 Thousand/µL      Eosinophils Absolute 0.01 Thousand/µL      Basophils Absolute 0.01 Thousands/µL     Narrative:      This is an appended report.  These results have been appended to a previously verified report.    Blood culture #1 [[id number]] Collected: 05/17/25 1632    Lab Status: In process Specimen: Blood from Arm, Left Updated: 05/17/25 1638    FLU/COVID Rapid Antigen (30 min. TAT) - Preferred screening test in ED [[id number]]  (Normal) Collected: 05/17/25 1615    Lab Status: Final result Specimen: Nares from Nose Updated: 05/17/25 1636     SARS COV Rapid Antigen Negative     Influenza A Rapid Antigen Negative     Influenza B Rapid Antigen Negative    Narrative:      This test has been performed using the [employer] Peri 2 FLU+SARS Antigen test under the Emergency Use Authorization (EUA). This test has been validated by the  and verified by the performing laboratory. The Peri uses lateral flow immunofluorescent sandwich assay to detect SARS-COV, Influenza A and Influenza B Antigen.     The Quidel Peir 2 SARS Antigen test does not differentiate between SARS-CoV and SARS-CoV-2.     Negative results are presumptive and may be confirmed with a molecular assay, if necessary, for patient management. Negative results do not rule out SARS-CoV-2 or influenza infection and should not  be used as the sole basis for treatment or patient management decisions. A negative test result may occur if the level of antigen in a sample is below the limit of detection of this test.     Positive results are indicative of the presence of viral antigens, but do not rule out bacterial infection or co-infection with other viruses.     All test results should be used as an adjunct to clinical observations and other information available to the provider.    FOR PEDIATRIC PATIENTS - copy/paste COVID Guidelines URL to browser: [email or web address]    Procalcitonin [[id number]]  (Normal) Collected: 05/17/25 1551    Lab Status: Final result Specimen: Blood from Arm, Right Updated: 05/17/25 1633     Procalcitonin 0.06 ng/ml     Protime-INR [[id number]]  (Abnormal) Collected: 05/17/25 1551    Lab Status: Final result Specimen: Blood from Arm, Right Updated: 05/17/25 1623     Protime 20.0 seconds      INR 1.65    Narrative:      INR Therapeutic Range    Indication                                             INR Range      Atrial Fibrillation                                               2.0-3.0  Hypercoagulable State                                    2.0.2.3  Left Ventricular Asist Device                            2.0-3.0  Mechanical Heart Valve                                  -    Aortic(with afib, MI, embolism, HF, LA enlargement,    and/or coagulopathy)                                     2.0-3.0 (2.5-3.5)     Mitral                                                             2.5-3.5  Prosthetic/Bioprosthetic Heart Valve               2.0-3.0  Venous thromboembolism (VTE: VT, PE        2.0-3.0    APTT [[id number]]  (Normal) Collected: 05/17/25 1551    Lab Status: Final result Specimen: Blood from Arm, Right Updated: 05/17/25 1623     PTT 30 seconds     Lactic acid [[id number]]  (Abnormal) Collected: 05/17/25 1551    Lab Status: Final result Specimen: Blood from Arm,  Right Updated: 05/17/25 1622     LACTIC ACID 2.1 mmol/L     Narrative:      Result may be elevated if tourniquet was used during collection.    Lactic acid 2 Hours [[id number]]     Lab Status: No result Specimen: Blood     Comprehensive metabolic panel [[id number]]  (Abnormal) Collected: 05/17/25 1551    Lab Status: Final result Specimen: Blood from Arm, Right Updated: 05/17/25 1622     Sodium 140 mmol/L      Potassium 4.0 mmol/L      Chloride 109 mmol/L      CO2 21 mmol/L      ANION GAP 10 mmol/L      BUN 47 mg/dL      Creatinine 1.95 mg/dL      Glucose 178 mg/dL      Calcium 8.6 mg/dL      AST 29 U/L      ALT 20 U/L      Alkaline Phosphatase 63 U/L      Total Protein 5.9 g/dL      Albumin 4.0 g/dL      Total Bilirubin 0.63 mg/dL      eGFR 23 ml/min/1.73sq m     Narrative:      National Kidney Disease Foundation guidelines for Chronic Kidney Disease (CKD):     Stage 1 with normal or high GFR (GFR > 90 mL/min/1.73 square meters)    Stage 2 Mild CKD (GFR = 60-89 mL/min/1.73 square meters)    Stage 3A Moderate CKD (GFR = 45-59 mL/min/1.73 square meters)    Stage 3B Moderate CKD (GFR = 30-44 mL/min/1.73 square meters)    Stage 4 Severe CKD (GFR = 15-29 mL/min/1.73 square meters)    Stage 5 End Stage CKD (GFR <15 mL/min/1.73 square meters)  Note: GFR calculation is accurate only with a steady state creatinine    HS Troponin 0hr (reflex protocol) [[id number]]     Lab Status: No result Specimen: Blood     B-Type Natriuretic Peptide(BNP) [[id number]]     Lab Status: No result Specimen: Blood     Blood culture #2 [[id number]] Collected: 05/17/25 1551    Lab Status: In process Specimen: Blood from Arm, Right Updated: 05/17/25 1601            XR chest 1 view portable   ED Interpretation by Juan A Shirley PA-C (05/17 1710)   Pulmonary vascular congestion appreciated bilaterally          Procedures    ED Medication and Procedure Management   Prior to Admission Medications   Prescriptions Last Dose Informant Patient Reported?  Taking?   Cholecalciferol (VITAMIN D3) 1,000 units tablet 2025  Yes Yes   Sig: Take 1,000 Units by mouth daily   Tocilizumab (Actemra ACTPen) 162 MG/0.9ML SOAJ Past Week  Yes Yes   Sig: Inject 162 mg under the skin once a week On    acetaminophen (TYLENOL) 650 mg CR tablet Unknown  Yes No   Sig: Take 650 mg by mouth every 6 (six) hours as needed for mild pain (fever)   albuterol (2.5 mg/3 mL) 0.083 % nebulizer solution 2025  Yes Yes   Sig: Take 2.5 mg by nebulization every 4 (four) hours as needed for wheezing or shortness of breath   allopurinol (ZYLOPRIM) 100 mg tablet 2025  Yes Yes   Sig: Take 100 mg by mouth daily   atorvastatin (LIPITOR) 40 mg tablet 2025  Yes Yes   Sig: Take 40 mg by mouth daily   azithromycin (ZITHROMAX) 250 mg tablet 2025  Yes Yes   Sig: Take 500 mg by mouth every 24 hours Starting 25  for 4 day course   bisacodyl 5 mg EC tablet Unknown  Yes No   Sig: Take 5 mg by mouth daily as needed for constipation   budesonide-formoterol (SYMBICORT) 160-4.5 mcg/act inhaler 2025 Morning  Yes Yes   Sig: Inhale 2 puffs 2 (two) times a day Rinse mouth after use.   cetirizine (ZyrTEC) 10 MG chewable tablet 2025  Yes Yes   Sig: Chew 10 mg before bedtime.   dextromethorphan-guaiFENesin (ROBITUSSIN DM)  mg/5 mL syrup 2025  Yes Yes   Sig: Take 10 mL by mouth 3 (three) times a day as needed for cough   fluticasone (FLONASE) 50 mcg/act nasal spray 2025  Yes Yes   Si spray into each nostril in the morning and 1 spray before bedtime.   folic acid (FOLVITE) 1 mg tablet 2025  Yes Yes   Sig: Take 1 mg by mouth daily   ipratropium-albuterol, FOR EMS ONLY, (DUO-NEB) 0.5-2.5 mg/3 mL nebulizer solution 2025  Yes Yes   Sig: Use 3 mL 4 (four) times a day   metoprolol succinate (TOPROL-XL) 25 mg 24 hr tablet 2025  Yes Yes   Sig: Take 25 mg by mouth daily   omega-3-acid ethyl esters (LOVAZA) 1 g capsule 2025  Yes Yes   Sig: Take 2 g by mouth  in the morning.   pantoprazole (PROTONIX) 40 mg tablet 2025 Morning  Yes Yes   Sig: Take 40 mg by mouth in the morning.   predniSONE 20 mg tablet 2025  Yes Yes   Sig: Take 40 mg by mouth in the morning. Taper started on 25.  40 mg for 4 days, 20 mg for 4 days, 10 mg for 2 days.  Given for cough.   rivaroxaban (XARELTO) 15 mg tablet 2025  Yes Yes   Sig: Take 15 mg by mouth in the evening. Take before meals   sodium chloride (OCEAN) 0.65 % nasal spray 2025  Yes Yes   Si spray into each nostril as needed for congestion      Facility-Administered Medications: None     Current Discharge Medication List        CONTINUE these medications which have NOT CHANGED    Details   albuterol (2.5 mg/3 mL) 0.083 % nebulizer solution Take 2.5 mg by nebulization every 4 (four) hours as needed for wheezing or shortness of breath      allopurinol (ZYLOPRIM) 100 mg tablet Take 100 mg by mouth daily      atorvastatin (LIPITOR) 40 mg tablet Take 40 mg by mouth daily      azithromycin (ZITHROMAX) 250 mg tablet Take 500 mg by mouth every 24 hours Starting 25  for 4 day course      budesonide-formoterol (SYMBICORT) 160-4.5 mcg/act inhaler Inhale 2 puffs 2 (two) times a day Rinse mouth after use.      cetirizine (ZyrTEC) 10 MG chewable tablet Chew 10 mg before bedtime.      Cholecalciferol (VITAMIN D3) 1,000 units tablet Take 1,000 Units by mouth daily      dextromethorphan-guaiFENesin (ROBITUSSIN DM)  mg/5 mL syrup Take 10 mL by mouth 3 (three) times a day as needed for cough      fluticasone (FLONASE) 50 mcg/act nasal spray 1 spray into each nostril in the morning and 1 spray before bedtime.      folic acid (FOLVITE) 1 mg tablet Take 1 mg by mouth daily      ipratropium-albuterol, FOR EMS ONLY, (DUO-NEB) 0.5-2.5 mg/3 mL nebulizer solution Use 3 mL 4 (four) times a day      metoprolol succinate (TOPROL-XL) 25 mg 24 hr tablet Take 25 mg by mouth daily      omega-3-acid ethyl esters (LOVAZA) 1 g capsule  Take 2 g by mouth in the morning.      pantoprazole (PROTONIX) 40 mg tablet Take 40 mg by mouth in the morning.      predniSONE 20 mg tablet Take 40 mg by mouth in the morning. Taper started on 5/14/25.  40 mg for 4 days, 20 mg for 4 days, 10 mg for 2 days.  Given for cough.      rivaroxaban (XARELTO) 15 mg tablet Take 15 mg by mouth in the evening. Take before meals      sodium chloride (OCEAN) 0.65 % nasal spray 1 spray into each nostril as needed for congestion      Tocilizumab (Actemra ACTPen) 162 MG/0.9ML SOAJ Inject 162 mg under the skin once a week On mondays      acetaminophen (TYLENOL) 650 mg CR tablet Take 650 mg by mouth every 6 (six) hours as needed for mild pain (fever)      bisacodyl 5 mg EC tablet Take 5 mg by mouth daily as needed for constipation           No discharge procedures on file.  ED SEPSIS DOCUMENTATION   Time reflects when diagnosis was documented in both MDM as applicable and the Disposition within this note       Time User Action Codes Description Comment    5/17/2025  4:52 PM Shirley, Juan A Add [R05.9] Cough     5/17/2025  5:38 PM Shirley, Juan A Add [D64.9] Anemia     5/17/2025  5:38 PM Shirley, Juan A Add [I50.9] CHF exacerbation (HCC)     5/17/2025  5:48 PM Yorty, Juan A Add [I50.33] Acute on chronic diastolic congestive heart failure (HCC)     5/17/2025  5:51 PM Yorty, Juan A Modify [R05.9] Cough     5/17/2025  5:51 PM Yorty, Jua Na Modify [D64.9] Anemia     5/17/2025  5:51 PM Yorty, Juan A Modify [I50.9] CHF exacerbation (HCC)     5/17/2025  5:51 PM Yorty, Juan A Modify [I50.33] Acute on chronic diastolic congestive heart failure (HCC)     5/17/2025  6:19 PM Yorty, Juan A Add [N18.4] Stage 4 chronic kidney disease (HCC)                      [1]   Social History  Tobacco Use    Smoking status: Never    Smokeless tobacco: Never   Substance Use Topics    Alcohol use: Never    Drug use: Never        Juan A Shirley, PA-C  05/17/25 [id number]

## 2025-05-18 LAB
ABO GROUP BLD BPU: NORMAL
ABO GROUP BLD BPU: NORMAL
ANION GAP SERPL CALCULATED.3IONS-SCNC: 9 MMOL/L (ref 4–13)
ATRIAL RATE: 86 BPM
BASOPHILS # BLD AUTO: 0.01 THOUSANDS/ÂΜL (ref 0–0.1)
BASOPHILS NFR BLD AUTO: 0 % (ref 0–1)
BPU ID: NORMAL
BPU ID: NORMAL
BUN SERPL-MCNC: 46 MG/DL (ref 5–25)
CALCIUM SERPL-MCNC: 8.3 MG/DL (ref 8.4–10.2)
CHLORIDE SERPL-SCNC: 111 MMOL/L (ref 96–108)
CO2 SERPL-SCNC: 21 MMOL/L (ref 21–32)
CREAT SERPL-MCNC: 1.68 MG/DL (ref 0.6–1.3)
CREAT UR-MCNC: 73.3 MG/DL
CROSSMATCH: NORMAL
CROSSMATCH: NORMAL
EOSINOPHIL # BLD AUTO: 0 THOUSAND/ÂΜL (ref 0–0.61)
EOSINOPHIL NFR BLD AUTO: 0 % (ref 0–6)
ERYTHROCYTE [DISTWIDTH] IN BLOOD BY AUTOMATED COUNT: 21.5 % (ref 11.6–15.1)
FERRITIN SERPL-MCNC: 20 NG/ML (ref 30–307)
GFR SERPL CREATININE-BSD FRML MDRD: 27 ML/MIN/1.73SQ M
GLUCOSE SERPL-MCNC: 122 MG/DL (ref 65–140)
HCT VFR BLD AUTO: 26.3 % (ref 34.8–46.1)
HGB BLD-MCNC: 8 G/DL (ref 11.5–15.4)
IMM GRANULOCYTES # BLD AUTO: 0.05 THOUSAND/UL (ref 0–0.2)
IMM GRANULOCYTES NFR BLD AUTO: 1 % (ref 0–2)
IRON SATN MFR SERPL: 14 % (ref 15–50)
IRON SERPL-MCNC: 55 UG/DL (ref 50–212)
LYMPHOCYTES # BLD AUTO: 0.64 THOUSANDS/ÂΜL (ref 0.6–4.47)
LYMPHOCYTES NFR BLD AUTO: 10 % (ref 14–44)
MAGNESIUM SERPL-MCNC: 1.4 MG/DL (ref 1.9–2.7)
MCH RBC QN AUTO: 29 PG (ref 26.8–34.3)
MCHC RBC AUTO-ENTMCNC: 30.4 G/DL (ref 31.4–37.4)
MCV RBC AUTO: 95 FL (ref 82–98)
MONOCYTES # BLD AUTO: 0.34 THOUSAND/ÂΜL (ref 0.17–1.22)
MONOCYTES NFR BLD AUTO: 5 % (ref 4–12)
NEUTROPHILS # BLD AUTO: 5.56 THOUSANDS/ÂΜL (ref 1.85–7.62)
NEUTS SEG NFR BLD AUTO: 84 % (ref 43–75)
NRBC BLD AUTO-RTO: 0 /100 WBCS
PLATELET # BLD AUTO: 157 THOUSANDS/UL (ref 149–390)
PMV BLD AUTO: 11.3 FL (ref 8.9–12.7)
POTASSIUM SERPL-SCNC: 4.2 MMOL/L (ref 3.5–5.3)
QRS AXIS: -41 DEGREES
QRSD INTERVAL: 104 MS
QT INTERVAL: 350 MS
QTC INTERVAL: 439 MS
RBC # BLD AUTO: 2.76 MILLION/UL (ref 3.81–5.12)
SODIUM SERPL-SCNC: 141 MMOL/L (ref 135–147)
SODIUM UR-SCNC: 68 MMOL/L
T WAVE AXIS: 95 DEGREES
TIBC SERPL-MCNC: 399 UG/DL (ref 250–450)
TRANSFERRIN SERPL-MCNC: 285 MG/DL (ref 203–362)
TSH SERPL DL<=0.05 MIU/L-ACNC: 0.34 UIU/ML (ref 0.45–4.5)
UIBC SERPL-MCNC: 344 UG/DL (ref 155–355)
UNIT DISPENSE STATUS: NORMAL
UNIT DISPENSE STATUS: NORMAL
UNIT PRODUCT CODE: NORMAL
UNIT PRODUCT CODE: NORMAL
UNIT PRODUCT VOLUME: 350 ML
UNIT PRODUCT VOLUME: 350 ML
UNIT RH: NORMAL
UNIT RH: NORMAL
VENTRICULAR RATE: 95 BPM
WBC # BLD AUTO: 6.6 THOUSAND/UL (ref 4.31–10.16)

## 2025-05-18 PROCEDURE — 85025 COMPLETE CBC W/AUTO DIFF WBC: CPT | Performed by: INTERNAL MEDICINE

## 2025-05-18 PROCEDURE — 99223 1ST HOSP IP/OBS HIGH 75: CPT

## 2025-05-18 PROCEDURE — 99232 SBSQ HOSP IP/OBS MODERATE 35: CPT | Performed by: INTERNAL MEDICINE

## 2025-05-18 PROCEDURE — 84300 ASSAY OF URINE SODIUM: CPT

## 2025-05-18 PROCEDURE — 82570 ASSAY OF URINE CREATININE: CPT

## 2025-05-18 PROCEDURE — 83735 ASSAY OF MAGNESIUM: CPT | Performed by: INTERNAL MEDICINE

## 2025-05-18 PROCEDURE — 99223 1ST HOSP IP/OBS HIGH 75: CPT | Performed by: INTERNAL MEDICINE

## 2025-05-18 PROCEDURE — 93010 ELECTROCARDIOGRAM REPORT: CPT | Performed by: INTERNAL MEDICINE

## 2025-05-18 PROCEDURE — 80048 BASIC METABOLIC PNL TOTAL CA: CPT | Performed by: INTERNAL MEDICINE

## 2025-05-18 PROCEDURE — 84443 ASSAY THYROID STIM HORMONE: CPT | Performed by: INTERNAL MEDICINE

## 2025-05-18 RX ORDER — PHENAZOPYRIDINE HYDROCHLORIDE 100 MG/1
100 TABLET, FILM COATED ORAL
Status: DISCONTINUED | OUTPATIENT
Start: 2025-05-18 | End: 2025-05-18

## 2025-05-18 RX ORDER — MAGNESIUM SULFATE HEPTAHYDRATE 40 MG/ML
4 INJECTION, SOLUTION INTRAVENOUS ONCE
Status: COMPLETED | OUTPATIENT
Start: 2025-05-18 | End: 2025-05-18

## 2025-05-18 RX ORDER — FUROSEMIDE 10 MG/ML
50 INJECTION INTRAMUSCULAR; INTRAVENOUS 2 TIMES DAILY
Status: DISCONTINUED | OUTPATIENT
Start: 2025-05-18 | End: 2025-05-21

## 2025-05-18 RX ORDER — NYSTATIN 100000 [USP'U]/G
POWDER TOPICAL 2 TIMES DAILY
Status: DISCONTINUED | OUTPATIENT
Start: 2025-05-18 | End: 2025-05-22 | Stop reason: HOSPADM

## 2025-05-18 RX ADMIN — MAGNESIUM SULFATE HEPTAHYDRATE 4 G: 40 INJECTION, SOLUTION INTRAVENOUS at 10:04

## 2025-05-18 RX ADMIN — FUROSEMIDE 50 MG: 10 INJECTION, SOLUTION INTRAMUSCULAR; INTRAVENOUS at 15:39

## 2025-05-18 RX ADMIN — FUROSEMIDE 50 MG: 10 INJECTION, SOLUTION INTRAMUSCULAR; INTRAVENOUS at 19:11

## 2025-05-18 RX ADMIN — PANTOPRAZOLE SODIUM 40 MG: 40 TABLET, DELAYED RELEASE ORAL at 06:25

## 2025-05-18 RX ADMIN — NYSTATIN: 100000 POWDER TOPICAL at 19:29

## 2025-05-18 RX ADMIN — DOXYCYCLINE 100 MG: 100 CAPSULE ORAL at 21:12

## 2025-05-18 RX ADMIN — NYSTATIN: 100000 POWDER TOPICAL at 11:19

## 2025-05-18 RX ADMIN — DOXYCYCLINE 100 MG: 100 CAPSULE ORAL at 10:05

## 2025-05-18 NOTE — ASSESSMENT & PLAN NOTE
Lab Results   Component Value Date    EGFR 27 05/18/2025    EGFR 23 05/17/2025    EGFR 39.5 05/01/2017    CREATININE 1.68 (H) 05/18/2025    CREATININE 1.95 (H) 05/17/2025    CREATININE 1.31 (H) 05/01/2017   Baseline creatinine unclear.  Last known labs from 2017 with creatinine 1.2-1.5.  No previous nephrology records available in Care Everywhere.    Etiology presumed cardiorenal pathophysiology, obesity related FSGS and age-related nephron loss.  Creatinine 1.68 mg/dL, GFR 27 mL/min.  Creatinine previously 1.95 on 5/17 and 1.31 on 5/1.  UA trace intact blood, 1+ leukocytes, 30-50 WBC, innumerable bacteria.    Will check urine creatinine, urine sodium and renal ultrasound.  Chest x-ray-wet read noting bilateral pulmonary vascular congestion, 5/17.  Volume status examines hypervolemic  Continue to avoid nephrotoxins and NSAIDs.  Avoid hypotension.  Receiving Lasix 50 mg twice daily IV.

## 2025-05-18 NOTE — ASSESSMENT & PLAN NOTE
Lab Results   Component Value Date    EGFR 27 05/18/2025    EGFR 23 05/17/2025    EGFR 39.5 05/01/2017    CREATININE 1.68 (H) 05/18/2025    CREATININE 1.95 (H) 05/17/2025    CREATININE 1.31 (H) 05/01/2017     - Counseled patient on dietary and lifestyle modifications will need to monitor closely with diuretic regimen and will follow-up nephrology recommendations  -Continue to monitor patient clinically.

## 2025-05-18 NOTE — ASSESSMENT & PLAN NOTE
Wt Readings from Last 3 Encounters:   05/18/25 105 kg (231 lb 0.7 oz)       - Transthoracic echocardiogram from [facility] in 2017 showing preserved ejection fraction with moderate aortic regurgitation  - Follow-up repeat transthoracic echocardiogram  -  on admission  - Patient declined invasive or aggressive treatments or strategies including ischemic evaluation but is agreeable to medical therapy  - Continue IV furosemide 50 mg twice daily  - Continue metoprolol succinate 25 mg daily  - Would monitor renal function and electrolytes with repletion to goal potassium 4.0, magnesium 2.0.

## 2025-05-18 NOTE — PROGRESS NOTES
Progress Note - Hospitalist   Name: Riri Cuellar 85 y.o. female I MRN: [id number]  Unit/Bed#: -01 I Date of Admission: 5/17/2025   Date of Service: 5/18/2025 I Hospital Day: 1     Assessment & Plan  Acute on chronic diastolic congestive heart failure (HCC)  Patient presents with shortness of breath and significant lower extremity edema  Chest x-ray with significant cardiomegaly and pulmonary vascular congestion  Patient has not seen a cardiologist or had any evaluation in quite some time  Physical exam with significant lower extremity pitting edema  NT-proBNP unremarkable for patient's age  Troponin I 53 ; patient not interested in ischemic evaluation at this time  Initiate Lasix 50 mg IV 2 times daily  Check 2D echocardiogram  Monitor on telemetry  Strict intake and output  Daily standing weights  Sodium and fluid restricted diets  Cardiology consultation appreciated  Continue home beta-blocker  Currently level 3 DNR/DNI  May need goals of care discussions if patient does not improve clinically  Patient and POA not interested in any invasive procedures at this time  Anemia  Hemoglobin 6.6 on presentation which improved to 8.0 status post 2 units of packed red blood cells  No signs of active bleeding  Patient does have history of iron deficiency anemia  Likely anemia of chronic kidney disease superimposed with dilution in the setting of significant volume overload  Check iron panel  Trend CBC  Monitor for signs of active bleeding  Hold off on IV Venofer in the setting of significant volume overload  Stage 4 chronic kidney disease (HCC)  Lab Results   Component Value Date    EGFR 27 05/18/2025    EGFR 23 05/17/2025    EGFR 39.5 05/01/2017    CREATININE 1.68 (H) 05/18/2025    CREATININE 1.95 (H) 05/17/2025    CREATININE 1.31 (H) 05/01/2017   Unclear baseline as patient has not had BMP since 2017  Creatinine 1.95 on presentation  Possibly secondary to cardiorenal syndrome in the setting of significant volume  overload  IV diuresis as above  Nephrology consultation appreciated  Trend BMP  Avoid nephrotoxic agents and hypotension  Strict intake and output  Daily standing weights  Creatinine improving with IV diuresis  Paroxysmal atrial fibrillation (HCC)  Heart rates well-controlled  Continue home beta-blocker for rate control and Xarelto for CVA prophylaxis  Monitor heart rates  Gastroesophageal reflux disease without esophagitis  Continue home PPI  Rheumatoid arthritis (HCC)  History of RA on methotrexate  Outpatient follow-up with Rheumatology    VTE Pharmacologic Prophylaxis: VTE Score: 6 High Risk (Score >/= 5) - Pharmacological DVT Prophylaxis Ordered: rivaroxaban (Xarelto). Sequential Compression Devices Ordered.    Mobility:   Basic Mobility Inpatient Raw Score: 14  -HLM Goal: 4: Move to chair/commode  JH-HLM Achieved: 2: Bed activities/Dependent transfer  JH-HLM Goal NOT achieved. Continue with multidisciplinary rounding and encourage appropriate mobility to improve upon -HLM goals.    Patient Centered Rounds: I performed bedside rounds with nursing staff today.     Discussions with Specialists or Other Care Team Provider: Cardiology, Nephrology    Education and Discussions with Family / Patient: Updated  (daughter) at bedside.    Current Length of Stay: 1 day(s)  Current Patient Status: Inpatient   Certification Statement: The patient will continue to require additional inpatient hospital stay due to acute on chronic diastolic congestive heart failure exacerbation  Discharge Plan: Anticipate discharge in 48-72 hrs to discharge location to be determined pending rehab evaluations.    Code Status: Level 3 - DNAR and DNI    Subjective   Patient seen and examined at bedside.  No acute events overnight.  Patient diuresing well.  Pending 2D echocardiogram.  Patient and POA not interested in any invasive procedures at this time.  Creatinine improving.    Objective :  Temp:  [97.4 °F (36.3 °C)-98.3 °F  (36.8 °C)] 97.6 °F (36.4 °C)  HR:  [] 78  BP: ()/(53-73) 109/73  Resp:  [17-29] 18  SpO2:  [94 %-100 %] 94 %  O2 Device: None (Room air)    Body mass index is 45.12 kg/m².     Input and Output Summary (last 24 hours):     Intake/Output Summary (Last 24 hours) at 5/18/2025 0797  Last data filed at 5/18/2025 0622  Gross per 24 hour   Intake 833.33 ml   Output 720 ml   Net 113.33 ml       Physical Exam  Vitals and nursing note reviewed.   Constitutional:       General: She is not in acute distress.     Appearance: She is well-developed. She is obese.   HENT:      Head: Normocephalic and atraumatic.     Eyes:      Conjunctiva/sclera: Conjunctivae normal.       Cardiovascular:      Rate and Rhythm: Normal rate and regular rhythm.      Heart sounds: No murmur heard.  Pulmonary:      Effort: Pulmonary effort is normal. No respiratory distress.      Breath sounds: Normal breath sounds.   Abdominal:      Palpations: Abdomen is soft.      Tenderness: There is no abdominal tenderness.     Musculoskeletal:         General: No swelling.      Cervical back: Neck supple.      Right lower leg: Edema present.      Left lower leg: Edema present.     Skin:     General: Skin is warm and dry.      Capillary Refill: Capillary refill takes less than 2 seconds.     Neurological:      Mental Status: She is alert.     Psychiatric:         Mood and Affect: Mood normal.       Lines/Drains:  Lines/Drains/Airways       Active Status       Name Placement date Placement time Site Days    External Urinary Catheter 05/17/25  1653  -- less than 1                      Telemetry:  Telemetry Orders (From admission, onward)               24 Hour Telemetry Monitoring  Continuous x 24 Hours (Telem)        Expiring   Question:  Reason for 24 Hour Telemetry  Answer:  Decompensated CHF- and any one of the following: continuous diuretic infusion or total diuretic dose >200 mg daily, associated electrolyte derangement (I.e. K < 3.0), inotropic drip  (continuous infusion), hx of ventricular arrhythmia, or new EF < 35%                     Telemetry Reviewed: Normal Sinus Rhythm  Indication for Continued Telemetry Use: Acute CHF on >200 mg lasix/day or equivalent dose or with new reduced EF.                Lab Results: I have reviewed the following results:   Results from last 7 days   Lab Units 05/18/25  0512   WBC Thousand/uL 6.60   HEMOGLOBIN g/dL 8.0*   HEMATOCRIT % 26.3*   PLATELETS Thousands/uL 157   SEGS PCT % 84*   LYMPHO PCT % 10*   MONO PCT % 5   EOS PCT % 0     Results from last 7 days   Lab Units 05/18/25  0512 05/17/25  1551   SODIUM mmol/L 141 140   POTASSIUM mmol/L 4.2 4.0   CHLORIDE mmol/L 111* 109*   CO2 mmol/L 21 21   BUN mg/dL 46* 47*   CREATININE mg/dL 1.68* 1.95*   ANION GAP mmol/L 9 10   CALCIUM mg/dL 8.3* 8.6   ALBUMIN g/dL  --  4.0   TOTAL BILIRUBIN mg/dL  --  0.63   ALK PHOS U/L  --  63   ALT U/L  --  20   AST U/L  --  29   GLUCOSE RANDOM mg/dL 122 178*     Results from last 7 days   Lab Units 05/17/25  1551   INR  1.65*             Results from last 7 days   Lab Units 05/17/25  1933 05/17/25  1551   LACTIC ACID mmol/L 2.3* 2.1*   PROCALCITONIN ng/ml  --  0.06       Recent Cultures (last 7 days):   Results from last 7 days   Lab Units 05/17/25  1632 05/17/25  1551   BLOOD CULTURE  Received in Microbiology Lab. Culture in Progress. Received in Microbiology Lab. Culture in Progress.       Imaging Results Review: No pertinent imaging studies reviewed.  Other Study Results Review: No additional pertinent studies reviewed.    Last 24 Hours Medication List:     Current Facility-Administered Medications:     allopurinol (ZYLOPRIM) tablet 100 mg, Daily    atorvastatin (LIPITOR) tablet 40 mg, Daily With Dinner    doxycycline hyclate (VIBRAMYCIN) capsule 100 mg, Q12H MICHAEL    furosemide (LASIX) injection 50 mg, BID    metoprolol succinate (TOPROL-XL) 24 hr tablet 25 mg, Daily    ondansetron (ZOFRAN) injection 4 mg, Q6H PRN    pantoprazole (PROTONIX)  EC tablet 40 mg, Early Morning    rivaroxaban (XARELTO) tablet 15 mg, Daily With Dinner    Administrative Statements   Today, Patient Was Seen By: Juan A Fowler, DO  I have spent a total time of 35 minutes in caring for this patient on the day of the visit/encounter including Diagnostic results, Prognosis, Risks and benefits of tx options, Instructions for management, Patient and family education, Importance of tx compliance, Risk factor reductions, Impressions, Counseling / Coordination of care, Documenting in the medical record, Reviewing/placing orders in the medical record (including tests, medications, and/or procedures), Obtaining or reviewing history  , and Communicating with other healthcare professionals .    **Please Note: This note may have been constructed using a voice recognition system.**

## 2025-05-18 NOTE — ASSESSMENT & PLAN NOTE
- Appears rate controlled at this time  -Can continue metoprolol succinate 25 mg daily and Xarelto for stroke risk reduction however patient did need blood transfusion on admission but denies active bleeding therefore will need to monitor hemoglobin closely and if continues to downtrend would recommend gastroenterology evaluation at that time.

## 2025-05-18 NOTE — ASSESSMENT & PLAN NOTE
Patient presents with shortness of breath and significant lower extremity edema  Chest x-ray with significant cardiomegaly and pulmonary vascular congestion  Patient has not seen a cardiologist or had any evaluation in quite some time  Physical exam with significant lower extremity pitting edema  NT-proBNP unremarkable for patient's age  Troponin I 53 ; patient not interested in ischemic evaluation at this time  Initiate Lasix 50 mg IV 2 times daily  Check 2D echocardiogram  Monitor on telemetry  Strict intake and output  Daily standing weights  Sodium and fluid restricted diets  Cardiology consultation appreciated  Continue home beta-blocker  Currently level 3 DNR/DNI  May need goals of care discussions if patient does not improve clinically  Patient and POA not interested in any invasive procedures at this time

## 2025-05-18 NOTE — ASSESSMENT & PLAN NOTE
- Hemoglobin 6.6 on admission requiring transfusion  -Patient denies active bleeding   -continue to monitor

## 2025-05-18 NOTE — ASSESSMENT & PLAN NOTE
Lab Results   Component Value Date    EGFR 27 05/18/2025    EGFR 23 05/17/2025    EGFR 39.5 05/01/2017    CREATININE 1.68 (H) 05/18/2025    CREATININE 1.95 (H) 05/17/2025    CREATININE 1.31 (H) 05/01/2017   Unclear baseline as patient has not had BMP since 2017  Creatinine 1.95 on presentation  Possibly secondary to cardiorenal syndrome in the setting of significant volume overload  IV diuresis as above  Nephrology consultation appreciated  Trend BMP  Avoid nephrotoxic agents and hypotension  Strict intake and output  Daily standing weights  Creatinine improving with IV diuresis

## 2025-05-18 NOTE — ASSESSMENT & PLAN NOTE
Wt Readings from Last 3 Encounters:   05/18/25 105 kg (231 lb 0.7 oz)   EF 70%, 4/14/17. Repeat ECHO pending.  Continue management per cardiology/hospitalist.

## 2025-05-18 NOTE — CONSULTS
Consultation - Cardiology   Name: Riri Cuellar 85 y.o. female I MRN: [id number]  Unit/Bed#: -01 I Date of Admission: 5/17/2025   Date of Service: 5/18/2025 I Hospital Day: 1   Inpatient consult to Cardiology  Consult performed by: Winston Kuhn DO  Consult ordered by: Juan A Fowler DO        Physician Requesting Evaluation: Juan A Fowler DO   Reason for Evaluation / Principal Problem: Heart failure    Assessment & Plan  Acute on chronic diastolic congestive heart failure (HCC)  Wt Readings from Last 3 Encounters:   05/18/25 105 kg (231 lb 0.7 oz)       - Transthoracic echocardiogram from [facility] in 2017 showing preserved ejection fraction with moderate aortic regurgitation  - Follow-up repeat transthoracic echocardiogram  -  on admission  - Patient declined invasive or aggressive treatments or strategies including ischemic evaluation but is agreeable to medical therapy  - Continue IV furosemide 50 mg twice daily  - Continue metoprolol succinate 25 mg daily  - Would monitor renal function and electrolytes with repletion to goal potassium 4.0, magnesium 2.0.        Paroxysmal atrial fibrillation (HCC)  - Appears rate controlled at this time  -Can continue metoprolol succinate 25 mg daily and Xarelto for stroke risk reduction however patient did need blood transfusion on admission but denies active bleeding therefore will need to monitor hemoglobin closely and if continues to downtrend would recommend gastroenterology evaluation at that time.  Anemia  - Hemoglobin 6.6 on admission requiring transfusion  -Patient denies active bleeding   -continue to monitor    Stage 4 chronic kidney disease (HCC)  Lab Results   Component Value Date    EGFR 27 05/18/2025    EGFR 23 05/17/2025    EGFR 39.5 05/01/2017    CREATININE 1.68 (H) 05/18/2025    CREATININE 1.95 (H) 05/17/2025    CREATININE 1.31 (H) 05/01/2017     - Counseled patient on dietary and lifestyle modifications will need to monitor closely  with diuretic regimen and will follow-up nephrology recommendations  -Continue to monitor patient clinically.        Other summary comments:   -Patient declining any invasive aggressive treatments or strategies but is agreeable to medical therapy  - Will check transthoracic echocardiogram to assist with guideline directed medical therapy  - Thyroid function abnormal on admission and would recommend evaluation and treatment if necessary by primary team  - Would monitor renal function and electrolytes with repletion to goal potassium 4.0, magnesium 2.0  - Follow-up nephrology recommendations  - Continue furosemide 50 mg IV twice daily, metoprolol succinate 25 mg daily and can continue Xarelto for stroke risk reduction however if hemoglobin trends down further would recommend hematology and gastroenterology evaluation.      HPI: Riri Cuellar is a 85 y.o. year old female obesity, paroxysmal atrial fibrillation on oral anticoagulation with Xarelto, GERD, rheumatoid arthritis who presented to [facility] 5/17/2025 from assisted living facility with worsening shortness of breath, cough and lower extremity edema.  Patient had noted over approximately a 3-month timeframe she had noticed persistent shortness of breath and cough.  Symptoms worsened to the point where she could no longer stand it and therefore was brought to ER for evaluation.  In emergency department patient was found to be volume overloaded with also concern for symptomatic anemia with hemoglobin 6.6 on admission requiring blood transfusion.  Troponin was mildly elevated however patient declined any invasive or aggressive treatments or strategies but was agreeable to medical therapy and was admitted.  - Currently at bedside patient denies any chest pain, palpitations, loss of consciousness and notes her shortness of breath, cough and overall symptoms have significantly improved since blood transfusion and initiation of IV diuretic regimen.  She  notes significant urine output and overall denies any active bleeding that she is aware of.      EKG:   Currently rate controlled atrial fibrillation on telemetry    MOST  RECENT CARDIAC IMAGING:   -Transfer echocardiogram report [facility] 4/14/2017 with left-ventricular systolic function normal estimated LVEF 70% with mildly dilated left atrium, moderate aortic regurgitation with aortic valve sclerosis, mitral annular calcifications with mild tricuspid regurgitation and possible patent foramen ovale on color Doppler.        Review of Systems:   Review of Systems   Constitutional:  Positive for fatigue. Negative for chills, diaphoresis and fever.   HENT:  Negative for trouble swallowing and voice change.    Eyes:  Negative for pain and redness.   Respiratory:  Positive for cough (but improving) and shortness of breath (but improving).    Cardiovascular:  Positive for leg swelling. Negative for chest pain and palpitations.   Gastrointestinal:  Positive for abdominal pain (only when coughing). Negative for blood in stool, constipation, diarrhea, nausea and vomiting.   Genitourinary:  Negative for dysuria.   Musculoskeletal:  Positive for arthralgias. Negative for neck pain and neck stiffness.   Skin:  Negative for rash.   Neurological:  Negative for dizziness, syncope, light-headedness and headaches.   Psychiatric/Behavioral:  Negative for agitation and hallucinations.    All other systems reviewed and are negative.         Historical Information   History reviewed. No pertinent past medical history.  History reviewed. No pertinent surgical history.  Social History     Substance and Sexual Activity   Alcohol Use Never     Social History     Substance and Sexual Activity   Drug Use Never     Tobacco Use History[1]    Family History:   History reviewed.    Meds/Allergies   all current active meds have been reviewed    Medications Prior to Admission:     albuterol (2.5 mg/3 mL) 0.083 % nebulizer  solution    allopurinol (ZYLOPRIM) 100 mg tablet    atorvastatin (LIPITOR) 40 mg tablet    azithromycin (ZITHROMAX) 250 mg tablet    budesonide-formoterol (SYMBICORT) 160-4.5 mcg/act inhaler    cetirizine (ZyrTEC) 10 MG chewable tablet    Cholecalciferol (VITAMIN D3) 1,000 units tablet    dextromethorphan-guaiFENesin (ROBITUSSIN DM)  mg/5 mL syrup    fluticasone (FLONASE) 50 mcg/act nasal spray    folic acid (FOLVITE) 1 mg tablet    ipratropium-albuterol, FOR EMS ONLY, (DUO-NEB) 0.5-2.5 mg/3 mL nebulizer solution    metoprolol succinate (TOPROL-XL) 25 mg 24 hr tablet    omega-3-acid ethyl esters (LOVAZA) 1 g capsule    pantoprazole (PROTONIX) 40 mg tablet    predniSONE 20 mg tablet    rivaroxaban (XARELTO) 15 mg tablet    sodium chloride (OCEAN) 0.65 % nasal spray    Tocilizumab (Actemra ACTPen) 162 MG/0.9ML SOAJ    acetaminophen (TYLENOL) 650 mg CR tablet    bisacodyl 5 mg EC tablet    Allergies[2]    Objective   Vitals: Blood pressure 109/73, pulse 78, temperature 97.6 °F (36.4 °C), resp. rate 18, height 5' (1.524 m), weight 105 kg (231 lb 0.7 oz), SpO2 94%., Body mass index is 45.12 kg/m².,   Orthostatic Blood Pressures      Flowsheet Row Most Recent Value   Blood Pressure 109/73 filed at 2025 0722   Patient Position - Orthostatic VS Lying filed at 2025 2315            Systolic (24hrs), Av , Min:99 , Max:122     Diastolic (24hrs), Av, Min:53, Max:73    Physical Exam:  Physical Exam  Vitals reviewed.   Constitutional:       Appearance: She is obese. She is not diaphoretic.      Comments: Frail-appearing   HENT:      Head: Normocephalic and atraumatic.     Eyes:      General:         Right eye: No discharge.         Left eye: No discharge.     Neck:      Comments: Trachea midline, neck obese, difficult to assess JVD  Cardiovascular:      Heart sounds: Murmur (HARESH) heard.      Comments: Irregularly irregular rate and rhythm  Pulmonary:      Breath sounds: Wheezing present.      Comments:  Decreased breath sounds bilaterally  Chest:      Chest wall: No tenderness.   Abdominal:      Palpations: Abdomen is soft.      Tenderness: There is no abdominal tenderness. There is no rebound.      Comments: Active bowel sounds present     Musculoskeletal:      Right lower leg: Edema present.      Left lower leg: Edema present.     Skin:     General: Skin is warm and dry.     Neurological:      Mental Status: She is alert.      Comments: Awake, alert, able to answer questions appropriately.   Psychiatric:         Mood and Affect: Mood normal.         Behavior: Behavior normal.          Lab Results:   Labs reviewed and prominent abnormalities reviewed above and/or below.    Troponins:    Results from last 7 days   Lab Units 05/17/25  2211 05/17/25  1551   HS TNI 0HR ng/L  --  53*   HS TNI 2HR ng/L 43  --    HSTNI D2 ng/L -10  --      BNP:   Results from last 6 Months   Lab Units 05/17/25  1551   BNP pg/mL 233*                   [1]   Social History  Tobacco Use   Smoking Status Never   Smokeless Tobacco Never   [2]   Allergies  Allergen Reactions    Penicillins Anaphylaxis

## 2025-05-18 NOTE — PLAN OF CARE
Problem: PAIN - ADULT  Goal: Verbalizes/displays adequate comfort level or baseline comfort level  Description: Interventions:  - Encourage patient to monitor pain and request assistance  - Assess pain using appropriate pain scale  - Administer analgesics as ordered based on type and severity of pain and evaluate response  - Implement non-pharmacological measures as appropriate and evaluate response  - Consider cultural and social influences on pain and pain management  - Notify physician/advanced practitioner if interventions unsuccessful or patient reports new pain  - Educate patient/family on pain management process including their role and importance of  reporting pain   - Provide non-pharmacologic/complimentary pain relief interventions  Outcome: Progressing     Problem: INFECTION - ADULT  Goal: Absence or prevention of progression during hospitalization  Description: INTERVENTIONS:  - Assess and monitor for signs and symptoms of infection  - Monitor lab/diagnostic results  - Monitor all insertion sites, i.e. indwelling lines, tubes, and drains  - Monitor endotracheal if appropriate and nasal secretions for changes in amount and color  - [place] appropriate cooling/warming therapies per order  - Administer medications as ordered  - Instruct and encourage patient and family to use good hand hygiene technique  - Identify and instruct in appropriate isolation precautions for identified infection/condition  Outcome: Progressing

## 2025-05-18 NOTE — PLAN OF CARE
Problem: INFECTION - ADULT  Goal: Absence of fever/infection during neutropenic period  Description: INTERVENTIONS:  - Monitor WBC  - Perform strict hand hygiene  - Limit to healthy visitors only  - No plants, dried, fresh or silk flowers with asif in patient room  5/18/2025 1620 by Krystal Seals RN  Outcome: Progressing  5/18/2025 1619 by Krystal Seals RN  Outcome: Progressing     Problem: SAFETY ADULT  Goal: Patient will remain free of falls  Description: INTERVENTIONS:  - Educate patient/family on patient safety including physical limitations  - Instruct patient to call for assistance with activity   - Consider consulting OT/PT to assist with strengthening/mobility based on AM PAC & JH-HLM score  - Consult OT/PT to assist with strengthening/mobility   - Keep Call bell within reach  - Keep bed low and locked with side rails adjusted as appropriate  - Keep care items and personal belongings within reach  - Initiate and maintain comfort rounds  - Make Fall Risk Sign visible to staff  - Offer Toileting every 2 Hours, in advance of need  - Initiate/Maintain bed alarm  - Obtain necessary fall risk management equipment: socks  - Apply yellow socks and bracelet for high fall risk patients  - Consider moving patient to room near nurses station  5/18/2025 1620 by Krystal Seals RN  Outcome: Progressing  5/18/2025 1619 by Krystal Seals RN  Outcome: Progressing     Problem: DISCHARGE PLANNING  Goal: Discharge to home or other facility with appropriate resources  Description: INTERVENTIONS:  - Identify barriers to discharge w/patient and caregiver  - Arrange for needed discharge resources and transportation as appropriate  - Identify discharge learning needs (meds, wound care, etc.)  - Arrange for interpretive services to assist at discharge as needed  - Refer to Case Management Department for coordinating discharge planning if the patient needs post-hospital services based on physician/advanced practitioner order or complex  needs related to functional status, cognitive ability, or social support system  5/18/2025 1620 by Krystal Seals RN  Outcome: Progressing  5/18/2025 1619 by Krystal Seals RN  Outcome: Progressing     Problem: Knowledge Deficit  Goal: Patient/family/caregiver demonstrates understanding of disease process, treatment plan, medications, and discharge instructions  Description: Complete learning assessment and assess knowledge base.  Interventions:  - Provide teaching at level of understanding  - Provide teaching via preferred learning methods  5/18/2025 1620 by Krystal Seals RN  Outcome: Progressing  5/18/2025 1619 by Krystal Seals RN  Outcome: Progressing     Problem: Prexisting or High Potential for Compromised Skin Integrity  Goal: Skin integrity is maintained or improved  Description: INTERVENTIONS:  - Identify patients at risk for skin breakdown  - Assess and monitor skin integrity including under and around medical devices   - Assess and monitor nutrition and hydration status  - Monitor labs  - Assess for incontinence   - Turn and reposition patient  - Assist with mobility/ambulation  - Relieve pressure over roselyn prominences   - Avoid friction and shearing  - Provide appropriate hygiene as needed including keeping skin clean and dry  - Evaluate need for skin moisturizer/barrier cream  - Collaborate with interdisciplinary team  - Patient/family teaching  - Consider wound care consult    A

## 2025-05-18 NOTE — ASSESSMENT & PLAN NOTE
Hemoglobin 6.6 on presentation which improved to 8.0 status post 2 units of packed red blood cells  No signs of active bleeding  Patient does have history of iron deficiency anemia  Likely anemia of chronic kidney disease superimposed with dilution in the setting of significant volume overload  Check iron panel  Trend CBC  Monitor for signs of active bleeding  Hold off on IV Venofer in the setting of significant volume overload

## 2025-05-18 NOTE — PLAN OF CARE
Problem: PAIN - ADULT  Goal: Verbalizes/displays adequate comfort level or baseline comfort level  Description: Interventions:  - Encourage patient to monitor pain and request assistance  - Assess pain using appropriate pain scale  - Administer analgesics as ordered based on type and severity of pain and evaluate response  - Implement non-pharmacological measures as appropriate and evaluate response  - Consider cultural and social influences on pain and pain management  - Notify physician/advanced practitioner if interventions unsuccessful or patient reports new pain  - Educate patient/family on pain management process including their role and importance of  reporting pain   - Provide non-pharmacologic/complimentary pain relief interventions  5/18/2025 0518 by Jyothi Tapia RN  Outcome: Progressing  5/18/2025 0059 by Jyothi Tapia RN  Outcome: Progressing

## 2025-05-18 NOTE — PLAN OF CARE
Problem: PAIN - ADULT  Goal: Verbalizes/displays adequate comfort level or baseline comfort level  Description: Interventions:  - Encourage patient to monitor pain and request assistance  - Assess pain using appropriate pain scale  - Administer analgesics as ordered based on type and severity of pain and evaluate response  - Implement non-pharmacological measures as appropriate and evaluate response  - Consider cultural and social influences on pain and pain management  - Notify physician/advanced practitioner if interventions unsuccessful or patient reports new pain  - Educate patient/family on pain management process including their role and importance of  reporting pain   - Provide non-pharmacologic/complimentary pain relief interventions  Outcome: Progressing     Problem: INFECTION - ADULT  Goal: Absence of fever/infection during neutropenic period  Description: INTERVENTIONS:  - Monitor WBC  - Perform strict hand hygiene  - Limit to healthy visitors only  - No plants, dried, fresh or silk flowers with asif in patient room  Outcome: Progressing     Problem: SAFETY ADULT  Goal: Maintain or return to baseline ADL function  Description: INTERVENTIONS:  -  Assess patient's ability to carry out ADLs; assess patient's baseline for ADL function and identify physical deficits which impact ability to perform ADLs (bathing, care of mouth/teeth, toileting, grooming, dressing, etc.)  - Assess/evaluate cause of self-care deficits   - Assess range of motion  - Assess patient's mobility; develop plan if impaired  - Assess patient's need for assistive devices and provide as appropriate  - Encourage maximum independence but intervene and supervise when necessary  - Involve family in performance of ADLs  - Assess for home care needs following discharge   - Consider OT consult to assist with ADL evaluation and planning for discharge  - Provide patient education as appropriate  - Monitor functional capacity and physical  performance, use of AM PAC & -HLM   - Monitor gait, balance and fatigue with ambulation    Outcome: Progressing     Problem: DISCHARGE PLANNING  Goal: Discharge to home or other facility with appropriate resources  Description: INTERVENTIONS:  - Identify barriers to discharge w/patient and caregiver  - Arrange for needed discharge resources and transportation as appropriate  - Identify discharge learning needs (meds, wound care, etc.)  - Arrange for interpretive services to assist at discharge as needed  - Refer to Case Management Department for coordinating discharge planning if the patient needs post-hospital services based on physician/advanced practitioner order or complex needs related to functional status, cognitive ability, or social support system  Outcome: Progressing     Problem: Knowledge Deficit  Goal: Patient/family/caregiver demonstrates understanding of disease process, treatment plan, medications, and discharge instructions  Description: Complete learning assessment and assess knowledge base.  Interventions:  - Provide teaching at level of understanding  - Provide teaching via preferred learning methods  Outcome: Progressing     Problem: Prexisting or High Potential for Compromised Skin Integrity  Goal: Skin integrity is maintained or improved  Description: INTERVENTIONS:  - Identify patients at risk for skin breakdown  - Assess and monitor skin integrity including under and around medical devices   - Assess and monitor nutrition and hydration status  - Monitor labs  - Assess for incontinence   - Turn and reposition patient  - Assist with mobility/ambulation  - Relieve pressure over roselyn prominences   - Avoid friction and shearing  - Provide appropriate hygiene as needed including keeping skin clean and dry  - Evaluate need for skin moisturizer/barrier cream  - Collaborate with interdisciplinary team  - Patient/family teaching  - Consider wound care consult

## 2025-05-19 ENCOUNTER — APPOINTMENT (INPATIENT)
Dept: NON INVASIVE DIAGNOSTICS | Facility: HOSPITAL | Age: 85
DRG: 291 | End: 2025-05-19
Payer: COMMERCIAL

## 2025-05-19 ENCOUNTER — APPOINTMENT (INPATIENT)
Dept: RADIOLOGY | Facility: HOSPITAL | Age: 85
DRG: 291 | End: 2025-05-19
Payer: COMMERCIAL

## 2025-05-19 ENCOUNTER — APPOINTMENT (INPATIENT)
Dept: ULTRASOUND IMAGING | Facility: HOSPITAL | Age: 85
DRG: 291 | End: 2025-05-19
Payer: COMMERCIAL

## 2025-05-19 PROBLEM — R13.10 DYSPHAGIA: Status: ACTIVE | Noted: 2025-05-19

## 2025-05-19 LAB
ANION GAP SERPL CALCULATED.3IONS-SCNC: 8 MMOL/L (ref 4–13)
AORTIC ROOT: 3.1 CM
AORTIC VALVE MEAN VELOCITY: 22 M/S
ASCENDING AORTA: 3.6 CM
AV AREA BY CONTINUOUS VTI: 1.3 CM2
AV LVOT MEAN GRADIENT: 2 MMHG
AV LVOT PEAK GRADIENT: 4 MMHG
AV MEAN PRESS GRAD SYS DOP V1V2: 22 MMHG
AV ORIFICE AREA US: 1.27 CM2
AV PEAK GRADIENT: 37 MMHG
AV REGURGITATION PRESSURE HALF TIME: 934 MS
AV VELOCITY RATIO: 0.37
AV VMAX SYS DOP: 3.01 M/S
BASOPHILS # BLD AUTO: 0.03 THOUSANDS/ÂΜL (ref 0–0.1)
BASOPHILS NFR BLD AUTO: 1 % (ref 0–1)
BSA FOR ECHO PROCEDURE: 1.97 M2
BUN SERPL-MCNC: 48 MG/DL (ref 5–25)
CALCIUM SERPL-MCNC: 8.3 MG/DL (ref 8.4–10.2)
CHLORIDE SERPL-SCNC: 112 MMOL/L (ref 96–108)
CO2 SERPL-SCNC: 25 MMOL/L (ref 21–32)
CREAT SERPL-MCNC: 2.04 MG/DL (ref 0.6–1.3)
DOP CALC AO VTI: 68.95 CM
DOP CALC LVOT AREA: 3.46 CM2
DOP CALC LVOT CARDIAC INDEX: 3.48 L/MIN/M2
DOP CALC LVOT CARDIAC OUTPUT: 6.85 L/MIN
DOP CALC LVOT DIAMETER: 2.1 CM
DOP CALC LVOT PEAK VEL VTI: 25.32 CM
DOP CALC LVOT PEAK VEL: 1.03 M/S
DOP CALC LVOT STROKE INDEX: 46.7 ML/M2
DOP CALC LVOT STROKE VOLUME: 92
DOP CALC MV VTI: 33.49 CM
EOSINOPHIL # BLD AUTO: 0.09 THOUSAND/ÂΜL (ref 0–0.61)
EOSINOPHIL NFR BLD AUTO: 2 % (ref 0–6)
ERYTHROCYTE [DISTWIDTH] IN BLOOD BY AUTOMATED COUNT: 21.8 % (ref 11.6–15.1)
FRACTIONAL SHORTENING: 33 (ref 28–44)
GFR SERPL CREATININE-BSD FRML MDRD: 21 ML/MIN/1.73SQ M
GLUCOSE SERPL-MCNC: 112 MG/DL (ref 65–140)
GLUCOSE SERPL-MCNC: 78 MG/DL (ref 65–140)
HCT VFR BLD AUTO: 27.6 % (ref 34.8–46.1)
HGB BLD-MCNC: 8.4 G/DL (ref 11.5–15.4)
IMM GRANULOCYTES # BLD AUTO: 0.02 THOUSAND/UL (ref 0–0.2)
IMM GRANULOCYTES NFR BLD AUTO: 0 % (ref 0–2)
INTERVENTRICULAR SEPTUM IN DIASTOLE (PARASTERNAL SHORT AXIS VIEW): 0.8 CM
INTERVENTRICULAR SEPTUM: 0.8 CM (ref 0.6–1.1)
IVC: 2.7 MM
LAAS-AP2: 34.1 CM2
LAAS-AP4: 40.7 CM2
LEFT ATRIUM SIZE: 5 CM
LEFT ATRIUM VOLUME (MOD BIPLANE): 159 ML
LEFT ATRIUM VOLUME INDEX (MOD BIPLANE): 80.7 ML/M2
LEFT INTERNAL DIMENSION IN SYSTOLE: 2.8 CM (ref 2.1–4)
LEFT VENTRICULAR INTERNAL DIMENSION IN DIASTOLE: 4.2 CM (ref 3.5–6)
LEFT VENTRICULAR POSTERIOR WALL IN END DIASTOLE: 0.8 CM
LEFT VENTRICULAR STROKE VOLUME: 48 ML
LV EF US.2D.A4C+ESTIMATED: 46 %
LVSV (TEICH): 48 ML
LYMPHOCYTES # BLD AUTO: 1.09 THOUSANDS/ÂΜL (ref 0.6–4.47)
LYMPHOCYTES NFR BLD AUTO: 22 % (ref 14–44)
MAGNESIUM SERPL-MCNC: 2 MG/DL (ref 1.9–2.7)
MCH RBC QN AUTO: 29.4 PG (ref 26.8–34.3)
MCHC RBC AUTO-ENTMCNC: 30.4 G/DL (ref 31.4–37.4)
MCV RBC AUTO: 97 FL (ref 82–98)
MONOCYTES # BLD AUTO: 0.39 THOUSAND/ÂΜL (ref 0.17–1.22)
MONOCYTES NFR BLD AUTO: 8 % (ref 4–12)
MRSA NOSE QL CULT: ABNORMAL
MRSA NOSE QL CULT: ABNORMAL
MV MEAN GRADIENT: 3 MMHG
MV PEAK E VEL: 149 CM/S
MV PEAK GRADIENT: 9 MMHG
MV STENOSIS PRESSURE HALF TIME: 65 MS
MV VALVE AREA BY CONTINUITY EQUATION: 2.62 CM2
MV VALVE AREA P 1/2 METHOD: 3.4
NEUTROPHILS # BLD AUTO: 3.42 THOUSANDS/ÂΜL (ref 1.85–7.62)
NEUTS SEG NFR BLD AUTO: 67 % (ref 43–75)
NRBC BLD AUTO-RTO: 0 /100 WBCS
PHOSPHATE SERPL-MCNC: 4 MG/DL (ref 2.3–4.1)
PLATELET # BLD AUTO: 178 THOUSANDS/UL (ref 149–390)
PMV BLD AUTO: 11.3 FL (ref 8.9–12.7)
POTASSIUM SERPL-SCNC: 4 MMOL/L (ref 3.5–5.3)
RA PRESSURE ESTIMATED: 15 MMHG
RBC # BLD AUTO: 2.86 MILLION/UL (ref 3.81–5.12)
RIGHT ATRIUM AREA SYSTOLE A4C: 23.7 CM2
RIGHT VENTRICLE ID DIMENSION: 2.9 CM
RV PSP: 38 MMHG
SL CV AV DECELERATION TIME RETROGRADE: 3220 MS
SL CV AV PEAK GRADIENT RETROGRADE: 42 MMHG
SL CV LEFT ATRIUM LENGTH A2C: 6.8 CM
SL CV LV EF: 55
SL CV PED ECHO LEFT VENTRICLE DIASTOLIC VOLUME (MOD BIPLANE) 2D: 79 ML
SL CV PED ECHO LEFT VENTRICLE SYSTOLIC VOLUME (MOD BIPLANE) 2D: 31 ML
SODIUM SERPL-SCNC: 145 MMOL/L (ref 135–147)
TR MAX PG: 23 MMHG
TR PEAK VELOCITY: 2.4 M/S
TRICUSPID ANNULAR PLANE SYSTOLIC EXCURSION: 1.9 CM
TRICUSPID VALVE PEAK REGURGITATION VELOCITY: 2.42 M/S
WBC # BLD AUTO: 5.04 THOUSAND/UL (ref 4.31–10.16)

## 2025-05-19 PROCEDURE — 97167 OT EVAL HIGH COMPLEX 60 MIN: CPT

## 2025-05-19 PROCEDURE — 83735 ASSAY OF MAGNESIUM: CPT | Performed by: INTERNAL MEDICINE

## 2025-05-19 PROCEDURE — 74230 X-RAY XM SWLNG FUNCJ C+: CPT

## 2025-05-19 PROCEDURE — 93306 TTE W/DOPPLER COMPLETE: CPT | Performed by: INTERNAL MEDICINE

## 2025-05-19 PROCEDURE — 99232 SBSQ HOSP IP/OBS MODERATE 35: CPT | Performed by: INTERNAL MEDICINE

## 2025-05-19 PROCEDURE — 85025 COMPLETE CBC W/AUTO DIFF WBC: CPT | Performed by: INTERNAL MEDICINE

## 2025-05-19 PROCEDURE — 97163 PT EVAL HIGH COMPLEX 45 MIN: CPT

## 2025-05-19 PROCEDURE — 93306 TTE W/DOPPLER COMPLETE: CPT

## 2025-05-19 PROCEDURE — 84100 ASSAY OF PHOSPHORUS: CPT | Performed by: INTERNAL MEDICINE

## 2025-05-19 PROCEDURE — 99232 SBSQ HOSP IP/OBS MODERATE 35: CPT | Performed by: HOSPITALIST

## 2025-05-19 PROCEDURE — 82948 REAGENT STRIP/BLOOD GLUCOSE: CPT

## 2025-05-19 PROCEDURE — 92611 MOTION FLUOROSCOPY/SWALLOW: CPT

## 2025-05-19 PROCEDURE — 76775 US EXAM ABDO BACK WALL LIM: CPT

## 2025-05-19 PROCEDURE — 80048 BASIC METABOLIC PNL TOTAL CA: CPT | Performed by: INTERNAL MEDICINE

## 2025-05-19 PROCEDURE — 92610 EVALUATE SWALLOWING FUNCTION: CPT

## 2025-05-19 RX ORDER — ACETAMINOPHEN 325 MG/1
650 TABLET ORAL EVERY 6 HOURS PRN
Status: DISCONTINUED | OUTPATIENT
Start: 2025-05-19 | End: 2025-05-22 | Stop reason: HOSPADM

## 2025-05-19 RX ADMIN — METOPROLOL SUCCINATE 25 MG: 25 TABLET, EXTENDED RELEASE ORAL at 09:33

## 2025-05-19 RX ADMIN — ACETAMINOPHEN 650 MG: 325 TABLET ORAL at 21:49

## 2025-05-19 RX ADMIN — NYSTATIN: 100000 POWDER TOPICAL at 17:41

## 2025-05-19 RX ADMIN — DEXTROSE 1000 ML: 5 SOLUTION INTRAVENOUS at 11:25

## 2025-05-19 RX ADMIN — FUROSEMIDE 50 MG: 10 INJECTION, SOLUTION INTRAMUSCULAR; INTRAVENOUS at 11:22

## 2025-05-19 RX ADMIN — NYSTATIN: 100000 POWDER TOPICAL at 11:25

## 2025-05-19 RX ADMIN — ALLOPURINOL 100 MG: 100 TABLET ORAL at 11:23

## 2025-05-19 RX ADMIN — RIVAROXABAN 15 MG: 15 TABLET, FILM COATED ORAL at 17:41

## 2025-05-19 RX ADMIN — ATORVASTATIN CALCIUM 40 MG: 40 TABLET, FILM COATED ORAL at 17:41

## 2025-05-19 NOTE — ASSESSMENT & PLAN NOTE
Unspecified exact etiology  Case reviewed with speech therapy  We will check a video barium swallow study today  Patient to remain n.p.o. in the interim

## 2025-05-19 NOTE — ASSESSMENT & PLAN NOTE
Lab Results   Component Value Date    EGFR 21 05/19/2025    EGFR 27 05/18/2025    EGFR 23 05/17/2025    CREATININE 2.04 (H) 05/19/2025    CREATININE 1.68 (H) 05/18/2025    CREATININE 1.95 (H) 05/17/2025     Creatinine increasing though still examines hypervolemic  Suspect that she would benefit from continued diuretics, though will defer final decision to nephrology team.

## 2025-05-19 NOTE — UTILIZATION REVIEW
NOTIFICATION OF INPATIENT ADMISSION   AUTHORIZATION REQUEST   SERVICING FACILITY:   [place] [address]  Tax ID: [id number]  NPI: [id number]   ATTENDING PROVIDER:  Attending Name and NPI#: Elaine Lucas Md [[id number]]  Address: [address] [place] [address]  Phone: [phone]     ADMISSION INFORMATION:  Place of Service: Inpatient Acute [facility]  Place of Service Code: 21  Inpatient Admission Date/Time: 5/17/25  5:51 PM  Discharge Date/Time: No discharge date for patient encounter.  Admitting Diagnosis Code/Description:  Shortness of breath [R06.02]  Cough [R05.9]  Anemia [D64.9]  CHF exacerbation (HCC) [I50.9]  Acute on chronic diastolic congestive heart failure (HCC) [I50.33]  Stage 4 chronic kidney disease (HCC) [N18.4]     UTILIZATION REVIEW CONTACT:  Opal Lored[facility] Utilization   Network Utilization Review Department  Phone: [phone]  Fax: [phone]  Email: [email or web address]  Contact for approvals/pending authorizations, clinical reviews, and discharge.     PHYSICIAN ADVISORY SERVICES:  Medical Necessity Denial & Czly-ku-Qqzm Review  Phone: [phone]  Fax: [phone]  Email: [email or web address]     DISCHARGE SUPPORT TEAM:  For Patients Discharge Needs & Updates  Phone: [phone] opt. 2 Fax: [phone]  Email: [email or web address]

## 2025-05-19 NOTE — PLAN OF CARE
Problem: OCCUPATIONAL THERAPY ADULT  Goal: Performs self-care activities at highest level of function for planned discharge setting.  See evaluation for individualized goals.  Description: Treatment Interventions: ADL retraining, Functional transfer training, UE strengthening/ROM, Endurance training, Patient/family training, Compensatory technique education, Energy conservation     See flowsheet documentation for full assessment, interventions and recommendations.   Note: Limitation: Decreased ADL status, Decreased UE strength, Decreased Safe judgement during ADL, Decreased endurance, Decreased self-care trans, Decreased high-level ADLs  Prognosis: Good  Assessment: Pt is a 85 y.o. female seen for OT evaluation s/p admit to [facility] on 5/17/2025 w/ Acute on chronic diastolic congestive heart failure (HCC).  Comorbidities affecting pt's functional performance at time of assessment include: a-fib, GERD, RA, anemia, CKD, dysphagia. Personal factors affecting pt at time of IE include:limited home support, difficulty performing ADLS, difficulty performing IADLS , limited insight into deficits, decreased initiation and engagement , health management , and environment. OT order placed to assess Pt's ADLs, cognitive status, and performance during functional tasks in order to maximize safety and independence while making most appropriate d/c recommendations. Prior to admission, pt was I with ADLs, mobility and requires A with IADLs. Upon evaluation: the following deficits impact occupational performance: weakness, decreased strength, decreased balance, decreased tolerance, impaired attention, impaired sequencing, impaired problem solving, and decreased safety awareness. Pt's clinical presentation is currently stable  given new onset deficits that effect Pt's occupational performance and ability to safely return to [facility] including decrease activity tolerance, decrease standing balance, decrease sitting balance, decrease  performance during ADL tasks, decrease safety awareness , decrease generalized strength, decrease activity engagement, decrease performance during functional transfers, and high fall risk combined with medical complications of hypertension , pain impacting overall mobility status, A-fib, abnormal CBC, wounds, decreased skin integrity, and need for input for mobility technique/safety.  Pt to benefit from continued skilled OT tx while in the hospital to address deficits as defined above and maximize level of functional independence w ADL's and functional mobility. Occupational Performance areas to address include: grooming, bathing/shower, toilet hygiene, dressing, functional mobility, community mobility, and clothing management. From OT standpoint, recommendation at time of d/c would with moderate intensity OT resources.     Rehab Resource Intensity Level, OT: II (Moderate Resource Intensity)     Brook Nair OTR/L

## 2025-05-19 NOTE — PROGRESS NOTES
Progress Note - Hospitalist   Name: Riri Cuellar 85 y.o. female I MRN: [id number]  Unit/Bed#: -01 I Date of Admission: 5/17/2025   Date of Service: 5/19/2025 I Hospital Day: 2    Assessment & Plan  Acute on chronic diastolic congestive heart failure (HCC)  Patient presents with shortness of breath and significant lower extremity edema  Arrival   Appreciate cardiology input  Patient has diuresed almost 900 cc of fluid thus far  2D echocardiogram results reviewed-EF of 55%, see the full report for additional details  Continue goal-directed medical therapy with the following medications:-  #1.  Xarelto 15 mg p.o. daily  #2.  Toprol-XL 25 mg p.o. daily  #3.  Atorvastatin 40 mg p.o. daily  #4.  Furosemide 50 mg IV twice daily  Elevated troponins-  Troponin trend as follows-53, 43  Patient declined any further invasive testing, treatment, and/or workup such as a cardiac catheterization  Most likely none myocardial injury related elevated troponins in the setting of having CHF, and chronic kidney disease  Continue to monitor daily weight, input and output  Repeat blood work in the a.m.  Anemia  Hemoglobin was 6.6 at time of arrival which might have been a contributing factor to her initial complaint of shortness of breath   Patient has been transfused 2 units of packed red blood cells on this admission   Follow-up hemoglobin has been 8.0, and 8.4 respectively   Suspect that this is multifactorial in the setting of having anemia of chronic kidney disease, rheumatoid arthritis and iron deficiency anemia  Patient denies any visible blood loss, denies hematemesis, hemoptysis, and/or blood loss per rectum hemoglobin 6.6 on presentation which improved to 8.0 status post 2 units of packed red blood cells  Hold off on IV Venofer in the setting of significant volume overload  Continue to monitor H&H  Stage 4 chronic kidney disease (HCC)  Lab Results   Component Value Date    EGFR 21 05/19/2025    EGFR 27 05/18/2025     EGFR 23 05/17/2025    CREATININE 2.04 (H) 05/19/2025    CREATININE 1.68 (H) 05/18/2025    CREATININE 1.95 (H) 05/17/2025   Unclear baseline as patient has not had BMP since 2017  Creatinine 1.95 on presentation  Possibly secondary to cardiorenal syndrome in the setting of significant volume overload  IV diuresis as above  Mild bump in creatinine noted today  Defer additional management to nephrology  Dysphagia  Unspecified exact etiology  Case reviewed with speech therapy  We will check a video barium swallow study today  Patient to remain n.p.o. in the interim  Paroxysmal atrial fibrillation (HCC)  Continue Toprol-XL for rate control  Continue Xarelto for anticoagulation purposes  Gastroesophageal reflux disease without esophagitis  Continue Protonix  Rheumatoid arthritis (HCC)  History of RA on methotrexate  Outpatient follow-up with Rheumatology    VTE Pharmacologic Prophylaxis: VTE Score: 6 High Risk (Score >/= 5) - Pharmacological DVT Prophylaxis Ordered: rivaroxaban (Xarelto). Sequential Compression Devices Ordered.    Mobility:   Basic Mobility Inpatient Raw Score: 14  JH-HLM Goal: 4: Move to chair/commode  JH-HLM Achieved: 4: Move to chair/commode  JH-HLM Goal achieved. Continue to encourage appropriate mobility.    Patient Centered Rounds: I performed bedside rounds with nursing staff today.   Discussions with Specialists or Other Care Team Provider: Cardiology, nephrology    Education and Discussions with Family / Patient: Updated  (daughter) via phone.    Current Length of Stay: 2 day(s)  Current Patient Status: Inpatient   Certification Statement: The patient will continue to require additional inpatient hospital stay due to need for continued IV diuretics, and a video swallow evaluation along with continued renal function monitoring  Discharge Plan: Anticipate discharge in 24-48 hrs to discharge location to be determined pending rehab evaluations.    Code Status: Level 3 - DNAR and  DNI    Subjective   Patient seen, resting in bed, reports doing okay, no new complaints.    Objective :  Temp:  [97.5 °F (36.4 °C)-98.4 °F (36.9 °C)] 97.5 °F (36.4 °C)  HR:  [68-87] 68  BP: ()/(50-65) 103/64  Resp:  [14-18] 14  SpO2:  [91 %-97 %] 95 %  O2 Device: None (Room air)    Body mass index is 44.33 kg/m².     Input and Output Summary (last 24 hours):     Intake/Output Summary (Last 24 hours) at 5/19/2025 0957  Last data filed at 5/19/2025 0733  Gross per 24 hour   Intake 100 ml   Output 1080 ml   Net -980 ml       Physical Exam  Constitutional:       General: She is not in acute distress.     Appearance: Normal appearance. She is normal weight. She is not ill-appearing.   HENT:      Head: Normocephalic and atraumatic.      Nose: Nose normal.      Mouth/Throat:      Mouth: Mucous membranes are moist.     Eyes:      Extraocular Movements: Extraocular movements intact.      Pupils: Pupils are equal, round, and reactive to light.       Cardiovascular:      Rate and Rhythm: Normal rate and regular rhythm.      Pulses: Normal pulses.      Heart sounds: Normal heart sounds. No murmur heard.     No friction rub. No gallop.   Pulmonary:      Effort: Pulmonary effort is normal. No respiratory distress.      Breath sounds: Normal breath sounds. No wheezing, rhonchi or rales.      Comments: Decreased breath sounds bilaterally at the base  Abdominal:      General: There is no distension.      Palpations: Abdomen is soft. There is no mass.      Tenderness: There is no abdominal tenderness.      Hernia: No hernia is present.     Musculoskeletal:         General: No swelling or tenderness. Normal range of motion.      Cervical back: Normal range of motion and neck supple. No rigidity.      Right lower leg: Edema present.      Left lower leg: Edema present.     Skin:     General: Skin is warm.      Capillary Refill: Capillary refill takes less than 2 seconds.      Findings: No erythema or rash.     Neurological:       General: No focal deficit present.      Mental Status: She is alert and oriented to person, place, and time. Mental status is at baseline.      Cranial Nerves: No cranial nerve deficit.      Motor: No weakness.     Psychiatric:         Mood and Affect: Mood normal.         Behavior: Behavior normal.           Lines/Drains:  Lines/Drains/Airways       Active Status       Name Placement date Placement time Site Days    External Urinary Catheter 05/17/25  1653  -- 1                            Lab Results: I have reviewed the following results:   Results from last 7 days   Lab Units 05/19/25  0526   WBC Thousand/uL 5.04   HEMOGLOBIN g/dL 8.4*   HEMATOCRIT % 27.6*   PLATELETS Thousands/uL 178   SEGS PCT % 67   LYMPHO PCT % 22   MONO PCT % 8   EOS PCT % 2     Results from last 7 days   Lab Units 05/19/25  0526 05/18/25  0512 05/17/25  1551   SODIUM mmol/L 145   < > 140   POTASSIUM mmol/L 4.0   < > 4.0   CHLORIDE mmol/L 112*   < > 109*   CO2 mmol/L 25   < > 21   BUN mg/dL 48*   < > 47*   CREATININE mg/dL 2.04*   < > 1.95*   ANION GAP mmol/L 8   < > 10   CALCIUM mg/dL 8.3*   < > 8.6   ALBUMIN g/dL  --   --  4.0   TOTAL BILIRUBIN mg/dL  --   --  0.63   ALK PHOS U/L  --   --  63   ALT U/L  --   --  20   AST U/L  --   --  29   GLUCOSE RANDOM mg/dL 78   < > 178*    < > = values in this interval not displayed.     Results from last 7 days   Lab Units 05/17/25  1551   INR  1.65*             Results from last 7 days   Lab Units 05/17/25  1933 05/17/25  1551   LACTIC ACID mmol/L 2.3* 2.1*   PROCALCITONIN ng/ml  --  0.06       Recent Cultures (last 7 days):   Results from last 7 days   Lab Units 05/17/25  1632 05/17/25  1551   BLOOD CULTURE  No Growth at 24 hrs. No Growth at 24 hrs.       Imaging Results Review: I reviewed radiology reports from this admission including: Echocardiogram.  Other Study Results Review: No additional pertinent studies reviewed.    Last 24 Hours Medication List:     Current Facility-Administered  Medications:     allopurinol (ZYLOPRIM) tablet 100 mg, Daily    atorvastatin (LIPITOR) tablet 40 mg, Daily With Dinner    dextrose 5 % bolus 1,000 mL, Once    doxycycline hyclate (VIBRAMYCIN) capsule 100 mg, Q12H MICHAEL    furosemide (LASIX) injection 50 mg, BID    metoprolol succinate (TOPROL-XL) 24 hr tablet 25 mg, Daily    nystatin (MYCOSTATIN) powder, BID    ondansetron (ZOFRAN) injection 4 mg, Q6H PRN    pantoprazole (PROTONIX) EC tablet 40 mg, Early Morning    rivaroxaban (XARELTO) tablet 15 mg, Daily With Dinner    Administrative Statements   Today, Patient Was Seen By: Elaine Lucas MD      **Please Note: This note may have been constructed using a voice recognition system.**

## 2025-05-19 NOTE — PROGRESS NOTES
Progress Note - Nephrology   Name: Riri Cuellar 85 y.o. female I MRN: [id number]  Unit/Bed#: -01 I Date of Admission: 5/17/2025   Date of Service: 5/19/2025 I Hospital Day: 2    Assessment & Plan  Stage 4 chronic kidney disease (HCC)  Lab Results   Component Value Date    EGFR 21 05/19/2025    EGFR 27 05/18/2025    EGFR 23 05/17/2025    CREATININE 2.04 (H) 05/19/2025    CREATININE 1.68 (H) 05/18/2025    CREATININE 1.95 (H) 05/17/2025   Baseline creatinine unclear.  Last known labs from 2017 with creatinine 1.2-1.5.  No previous nephrology records available in Care Everywhere.    Etiology presumed cardiorenal pathophysiology, obesity related FSGS and age-related nephron loss.  Creatinine 1.68 mg/dL, GFR 27 mL/min.  Creatinine previously 1.95 on 5/17 and 1.31 on 5/1.  UA trace intact blood, 1+ leukocytes, 30-50 WBC, innumerable bacteria.    Will check urine creatinine, urine sodium and renal ultrasound.  Chest x-ray-wet read noting bilateral pulmonary vascular congestion, 5/17.  Volume status examines hypervolemic  Continue to avoid nephrotoxins and NSAIDs.  Avoid hypotension.  Receiving Lasix 50 mg twice daily IV.    5/19  Creatinine slightly elevated from 1.7-2.0, suspected to be on the basis of dehydration from being n.p.o. and receiving diuretics.  Status post 1 L of D5W bolus.  Continue with current management and supportive care measures    Acute on chronic diastolic congestive heart failure (HCC)  Wt Readings from Last 3 Encounters:   05/19/25 103 kg (227 lb)   EF 70%, 4/14/17. Repeat ECHO pending.  Continue management per cardiology/hospitalist.    Paroxysmal atrial fibrillation (HCC)  On Xarelto.  Gastroesophageal reflux disease without esophagitis    Rheumatoid arthritis (HCC)    Anemia  Hemoglobin 8.0 g/dL s/p PRBC x 2 for hemoglobin 6.6 on admit 5/17.  Dysphagia  Will undergo barium swallow study today        Subjective   Patient seen and examined.  She denies complaints.  She expected to have  stage IV CKD with unclear baseline creatinine.  She did not have labs or imaging since 2017.  She presented with shortness of breath and lower extremity edema.  Hemoglobin was 6.6 on admission status post 2 units of blood.  She is currently being diuresed with IV Lasix and there was a slight bump in creatinine today    Objective :  Temp:  [97.5 °F (36.4 °C)-98.4 °F (36.9 °C)] 98.4 °F (36.9 °C)  HR:  [68-99] 86  BP: ()/(50-72) 96/62  Resp:  [14-18] 16  SpO2:  [93 %-100 %] 100 %  O2 Device: Nasal cannula  Nasal Cannula O2 Flow Rate (L/min):  [2 L/min] 2 L/min    Current Weight: Weight - Scale: 103 kg (227 lb)  First Weight: Weight - Scale: 102 kg (225 lb 5 oz)  I/O         05/17 0701  05/18 0700 05/18 0701  05/19 0700 05/19 0701  05/20 0700    P.O.   0    Blood 833.3      IV Piggyback  100     Total Intake(mL/kg) 833.3 (7.9) 100 (1) 0 (0)    Urine (mL/kg/hr) 720 1080 (0.4) 825 (0.9)    Total Output 720 1080 825    Net +113.3 -980 -825                 Physical Exam  Vitals and nursing note reviewed.   Constitutional:       General: She is not in acute distress.     Appearance: She is well-developed.   HENT:      Head: Normocephalic and atraumatic.     Cardiovascular:      Rate and Rhythm: Normal rate and regular rhythm.      Heart sounds: No murmur heard.  Pulmonary:      Effort: Pulmonary effort is normal. No respiratory distress.      Breath sounds: Normal breath sounds.   Abdominal:      Palpations: Abdomen is soft.      Tenderness: There is no abdominal tenderness.     Musculoskeletal:      Right lower leg: Edema present.      Left lower leg: Edema present.     Skin:     General: Skin is warm and dry.      Capillary Refill: Capillary refill takes less than 2 seconds.     Neurological:      Mental Status: She is alert.     Psychiatric:         Mood and Affect: Mood normal.         Medications:  Current Medications[1]      Lab Results: I have reviewed the following results:  Results from last 7 days   Lab Units  "05/19/25  0526 05/18/25  0512 05/17/25  1551   WBC Thousand/uL 5.04 6.60 7.82   HEMOGLOBIN g/dL 8.4* 8.0* 6.6*   HEMATOCRIT % 27.6* 26.3* 23.5*   PLATELETS Thousands/uL 178 157 190   POTASSIUM mmol/L 4.0 4.2 4.0   CHLORIDE mmol/L 112* 111* 109*   CO2 mmol/L 25 21 21   BUN mg/dL 48* 46* 47*   CREATININE mg/dL 2.04* 1.68* 1.95*   CALCIUM mg/dL 8.3* 8.3* 8.6   MAGNESIUM mg/dL 2.0 1.4*  --    PHOSPHORUS mg/dL 4.0  --   --    ALBUMIN g/dL  --   --  4.0       Administrative Statements     Portions of the record may have been created with voice recognition software. Occasional wrong word or \"sound a like\" substitutions may have occurred due to the inherent limitations of voice recognition software. Read the chart carefully and recognize, using context, where substitutions have occurred.If you have any questions, please contact the dictating provider.       [1]   Current Facility-Administered Medications:     acetaminophen (TYLENOL) tablet 650 mg, 650 mg, Oral, Q6H PRN, Elaine Lucas MD    allopurinol (ZYLOPRIM) tablet 100 mg, 100 mg, Oral, Daily, Juna A Shirley PA-C, 100 mg at 05/19/25 1123    atorvastatin (LIPITOR) tablet 40 mg, 40 mg, Oral, Daily With Dinner, Juan A Fowler, DO, 40 mg at 05/17/25 2044    furosemide (LASIX) injection 50 mg, 50 mg, Intravenous, BID, Juan A Shinerty, DO, 50 mg at 05/19/25 1122    metoprolol succinate (TOPROL-XL) 24 hr tablet 25 mg, 25 mg, Oral, Daily, Juan A Shinerty, DO, 25 mg at 05/19/25 0933    nystatin (MYCOSTATIN) powder, , Topical, BID, Juan A Shinerty, DO, Given at 05/19/25 1125    ondansetron (ZOFRAN) injection 4 mg, 4 mg, Intravenous, Q6H PRN, Juan A Shinerty, DO    pantoprazole (PROTONIX) EC tablet 40 mg, 40 mg, Oral, Early Morning, Juan A Fowler DO, 40 mg at 05/18/25 0625    rivaroxaban (XARELTO) tablet 15 mg, 15 mg, Oral, Daily With Dinner, Juan A Fowler DO    "

## 2025-05-19 NOTE — UTILIZATION REVIEW
Initial Clinical Review    Admission: Date/Time/Statement:   Admission Orders (From admission, onward)       Ordered        05/17/25 1751  Inpatient Admission  Once                          Orders Placed This Encounter   Procedures    Inpatient Admission     Standing Status:   Standing     Number of Occurrences:   1     Level of Care:   Med Surg [16]     Estimated length of stay:   More than 2 Midnights     Certification:   I certify that inpatient services are medically necessary for this patient for a duration of greater than two midnights. See H&P and MD Progress Notes for additional information about the patient's course of treatment.     ED Arrival Information       Expected   -    Arrival   5/17/2025 15:38    Acuity   Emergent              Means of arrival   Ambulance    [facility]    Admission type   Emergency              Arrival complaint   -             Chief Complaint   Patient presents with    Shortness of Breath     Pt arrives via EMS coming from nursing home c/o of SOB; recently dx with pneumonia       Initial Presentation: 85 y.o. female with a PMH of paroxysmal atrial fibrillation on Xarelto, GERD, RA who presents with shortness of breath and lower extremity edema. Upon chart review it appears that the patient has not had labs or imaging since 2017. She states that over the course of the past few days she has become significantly short of breath and states that her legs are beginning to swell. Hemoglobin 6.6. No signs of active bleeding. ED provider ordered 2 units of packed red blood cells. Chest x-ray with significant cardiomegaly and pulmonary vascular congestion. Plan: Inpatient admission for evaluation and treatment of CHF, anemia, stage 4 CKD, Afib, GERD, RA: IV lasix 50 mg bid, Echo, telemetry, Cardiology consult, continue home BB, trend CBC, check iron panel, Nephrology consult, trend BMP, continue Xarelto, PPI, methotrexate.      Anticipated Length of Stay/Certification Statement: Patient will be admitted on an inpatient basis with an anticipated length of stay of greater than 2 midnights secondary to acute on chronic congestive heart failure exacerbation.     Date: 5/18   Day 2:     Nephrology consult: continue IV lasix 50 mg bid. Continue Xarelto. I have reviewed the nephrology recommendations including creatinine and volume status trends with hospitalist and we are in agreement with renal plan     Internal medicine: continue IV lasix 50 mg bid. Echo. Telemetry. Sodium and fluid restricted diet. Cardiology consult. Continue BB. Trend CBC. Hold off on IV Venofer in the setting of significant volume overload. Trend BMP. Continue PPI and methotrexate.     Cardiology consult: continue IV lasix 50 mg bid. Continue metoprolol and Xarelto.     Date: 5/19  Day 3: Has surpassed a 2nd midnight with active treatments and services. Patient has diuresed almost 900 cc of fluid thus far. 2D echocardiogram results reviewed-EF of 55%. Continue IV lasix 50 mg bid. Continue Toprol XL, atorvastatin, Xarelto. Patient denies any visible blood loss, denies hematemesis, hemoptysis, and/or blood loss per rectum hemoglobin 6.6 on presentation which improved to 8.0 status post 2 units of packed red blood cells. Monitor H&H. Mild bump in creatinine noted today.       ED Treatment-Medication Administration from 05/17/2025 1538 to 05/17/2025 1836         Date/Time Order Dose Route Action     05/17/2025 1644 bumetanide (BUMEX) injection 1 mg 1 mg Intravenous Given     05/17/2025 1712 ipratropium-albuterol (DUO-NEB) 0.5-2.5 mg/3 mL inhalation solution 3 mL 3 mL Nebulization Given            Scheduled Medications:  allopurinol, 100 mg, Oral, Daily  atorvastatin, 40 mg, Oral, Daily With Dinner  dextrose, 1,000 mL, Intravenous, Once  furosemide, 50 mg, Intravenous, BID  metoprolol succinate, 25 mg, Oral, Daily  nystatin, , Topical, BID  pantoprazole, 40 mg, Oral, Early  Morning  rivaroxaban, 15 mg, Oral, Daily With Dinner      Continuous IV Infusions:     PRN Meds:  ondansetron, 4 mg, Intravenous, Q6H PRN      ED Triage Vitals   Temperature Pulse Respirations Blood Pressure SpO2 Pain Score   05/17/25 1543 05/17/25 1542 05/17/25 1542 05/17/25 1544 05/17/25 1543 05/17/25 1542   (!) 97.4 °F (36.3 °C) 71 22 121/54 98 % 7     Weight (last 2 days)       Date/Time Weight    05/19/25 0646 103 (227)    05/19/25 0532 103 (227.29)    05/19/25 0500 103 (227.29)    05/18/25 0600 105 (231.04)    05/17/25 1542 102 (225.31)            Vital Signs (last 3 days)       Date/Time Temp Pulse Resp BP MAP (mmHg) SpO2 O2 Device Patient Position - Orthostatic VS Pain    05/19/25 0955 -- -- -- -- -- -- -- -- No Pain    05/19/25 07:33:24 97.5 °F (36.4 °C) 68 14 103/64 77 95 % None (Room air) Lying --    05/19/25 0646 -- 70 -- 103/65 -- -- -- -- --    05/19/25 0100 -- -- -- -- -- 97 % None (Room air) -- --    05/18/25 2300 -- -- -- -- -- -- -- -- No Pain    05/18/25 22:56:27 98.4 °F (36.9 °C) 87 18 98/50 66 93 % -- -- --    05/18/25 19:18:44 -- 85 -- 106/61 76 94 % -- -- --    05/18/25 13:45:46 98.3 °F (36.8 °C) 85 18 93/50 64 91 % None (Room air) Lying --    05/18/25 10:14:08 -- 83 -- 99/62 74 94 % None (Room air) -- 8    05/18/25 0900 -- -- -- -- -- 79 % -- -- --    05/18/25 07:22:53 97.6 °F (36.4 °C) 78 18 109/73 85 94 % -- -- --    05/17/25 2315 97.8 °F (36.6 °C) 84 20 114/54 74 97 % None (Room air) Lying --    05/17/25 2304 -- -- -- -- -- -- -- -- No Pain    05/17/25 2040 97.9 °F (36.6 °C) 73 20 110/59 -- 95 % None (Room air) -- --    05/17/25 2025 97.9 °F (36.6 °C) 91 20 110/59 -- 96 % None (Room air) -- --    05/17/25 20:18:33 97.9 °F (36.6 °C) 92 20 110/59 76 96 % -- -- --    05/17/25 1933 97.6 °F (36.4 °C) 75 -- 112/67 -- 97 % -- -- --    05/17/25 19:32:31 97.6 °F (36.4 °C) 92 20 112/67 82 97 % None (Room air) -- --    05/17/25 18:45:04 97.7 °F (36.5 °C) 103 20 99/73 82 99 % -- -- --    05/17/25 1753  98.3 °F (36.8 °C) 94 26 114/73 -- 95 % None (Room air) -- --    05/17/25 1738 98.2 °F (36.8 °C) 95 17 122/57 -- 99 % -- -- --    05/17/25 1734 98.2 °F (36.8 °C) 90 20 122/57 82 100 % None (Room air) Sitting --    05/17/25 1726 -- 93 22 -- -- 100 % -- -- --    05/17/25 1716 -- 98 29 -- -- 100 % -- -- --    05/17/25 1700 -- 96 22 121/53 77 99 % -- -- --    05/17/25 1647 -- 95 19 113/70 88 97 % -- -- --    05/17/25 1600 -- -- -- -- -- -- None (Room air) -- --    05/17/25 1545 -- 96 -- -- -- -- -- -- --    05/17/25 1544 -- -- -- 121/54 -- -- -- -- --    05/17/25 1543 97.4 °F (36.3 °C) -- -- -- -- 98 % -- -- --    05/17/25 1542 -- 71 22 -- -- -- None (Room air) Sitting 7              Pertinent Labs/Diagnostic Test Results:   Radiology:  XR chest 1 view portable   ED Interpretation by Juan A Shirley PA-C (05/17 1710)   Pulmonary vascular congestion appreciated bilaterally      Final Interpretation by Laxmi Enrique MD (05/18 4595)      Mild pulmonary venous congestion.            Workstation performed: [id number]         US kidney and bladder    (Results Pending)   FL barium swallow video w speech    (Results Pending)     Cardiology:  Echo complete   Final Result by Winston Kuhn DO (05/19 1157)        Left Ventricle: Left ventricular cavity size is normal. Wall thickness    is mildly increased. The left ventricular ejection fraction is 55%.    Systolic function is normal. Wall motion is normal.     Left Atrium: The atrium is dilated.     Right Atrium: The atrium is dilated.     Aortic Valve: The leaflets are thickened. The leaflets are calcified.    There is reduced mobility. There is mild to moderate regurgitation. There    is moderate stenosis. The aortic valve peak velocity is 3.01 m/s. The    aortic valve peak gradient is 37 mmHg. The aortic valve mean gradient is    22 mmHg. The dimensionless velocity index is 0.37. The aortic valve area    is 1.27 cm2. The stroke volume index is 46.70 ml/m2.     Mitral  Valve: There is annular calcification. There is moderate    regurgitation.     Tricuspid Valve: There is mild to moderate regurgitation.         ECG 12 lead   Final Result by Winston Kuhn DO (05/18 0752)   Atrial fibrillation with premature ventricular or aberrantly conducted    complexes   Left axis deviation   Possible Lateral infarct , age undetermined   Possible lead misplacement   Abnormal ECG   No previous ECGs available   Confirmed by Winston Kuhn ([id number]) on 5/18/2025 7:52:11 AM        GI:  No orders to display           Results from last 7 days   Lab Units 05/19/25 0526 05/18/25 0512 05/17/25  1551   WBC Thousand/uL 5.04 6.60 7.82   HEMOGLOBIN g/dL 8.4* 8.0* 6.6*   HEMATOCRIT % 27.6* 26.3* 23.5*   PLATELETS Thousands/uL 178 157 190   TOTAL NEUT ABS Thousands/µL 3.42 5.56 7.29         Results from last 7 days   Lab Units 05/19/25 0526 05/18/25 0512 05/17/25  1551   SODIUM mmol/L 145 141 140   POTASSIUM mmol/L 4.0 4.2 4.0   CHLORIDE mmol/L 112* 111* 109*   CO2 mmol/L 25 21 21   ANION GAP mmol/L 8 9 10   BUN mg/dL 48* 46* 47*   CREATININE mg/dL 2.04* 1.68* 1.95*   EGFR ml/min/1.73sq m 21 27 23   CALCIUM mg/dL 8.3* 8.3* 8.6   MAGNESIUM mg/dL 2.0 1.4*  --    PHOSPHORUS mg/dL 4.0  --   --      Results from last 7 days   Lab Units 05/17/25  1551   AST U/L 29   ALT U/L 20   ALK PHOS U/L 63   TOTAL PROTEIN g/dL 5.9*   ALBUMIN g/dL 4.0   TOTAL BILIRUBIN mg/dL 0.63         Results from last 7 days   Lab Units 05/19/25 0526 05/18/25 0512 05/17/25  1551   GLUCOSE RANDOM mg/dL 78 122 178*           Results from last 7 days   Lab Units 05/17/25  2211 05/17/25  1551   HS TNI 0HR ng/L  --  53*   HS TNI 2HR ng/L 43  --    HSTNI D2 ng/L -10  --          Results from last 7 days   Lab Units 05/17/25  1551   PROTIME seconds 20.0*   INR  1.65*   PTT seconds 30     Results from last 7 days   Lab Units 05/18/25  0521   TSH 3RD GENERATON uIU/mL 0.338*     Results from last 7 days   Lab Units 05/17/25  1551    PROCALCITONIN ng/ml 0.06     Results from last 7 days   Lab Units 05/17/25  1933 05/17/25  1551   LACTIC ACID mmol/L 2.3* 2.1*             Results from last 7 days   Lab Units 05/17/25  1551   BNP pg/mL 233*     Results from last 7 days   Lab Units 05/17/25  2211   FERRITIN ng/mL 20*   IRON SATURATION % 14*   IRON ug/dL 55   TIBC ug/dL 399     Results from last 7 days   Lab Units 05/17/25  2211   TRANSFERRIN mg/dL 285     Results from last 7 days   Lab Units 05/18/25  0530   UNIT PRODUCT CODE  [id number]  [id number]   UNIT NUMBER  [id number]  [id number]   UNITABO  O  O   UNITRH  POS  POS   CROSSMATCH  Compatible  Compatible   UNIT DISPENSE STATUS  Presumed Trans  Presumed Trans   UNIT PRODUCT VOL ml 350  350           Results from last 7 days   Lab Units 05/18/25  1354 05/17/25  1713   CLARITY UA   --  Clear   COLOR UA   --  Yellow   SPEC GRAV UA   --  1.015   PH UA   --  6.0   GLUCOSE UA mg/dl  --  Negative   KETONES UA mg/dl  --  Negative   BLOOD UA   --  Trace-Intact*   PROTEIN UA mg/dl  --  Negative   NITRITE UA   --  Negative   BILIRUBIN UA   --  Negative   UROBILINOGEN UA E.U./dl  --  0.2   LEUKOCYTES UA   --  1+*   WBC UA /hpf  --  30-50*   RBC UA /hpf  --  0-1   BACTERIA UA /hpf  --  Innumerable*   EPITHELIAL CELLS WET PREP /hpf  --  Occasional   SODIUM UR mmol/L 68.0  --    CREATININE UR mg/dL 73.3  --            Results from last 7 days   Lab Units 05/17/25  1713 05/17/25  1632 05/17/25  1551   BLOOD CULTURE   --  No Growth at 24 hrs. No Growth at 24 hrs.   URINE CULTURE  >100,000 cfu/ml Gram Negative Walt Enteric Like*  --   --          History reviewed. No pertinent past medical history.  Present on Admission:   Acute on chronic diastolic congestive heart failure (HCC)   Paroxysmal atrial fibrillation (HCC)   Gastroesophageal reflux disease without esophagitis   Rheumatoid arthritis (HCC)   Anemia      Admitting Diagnosis: Shortness of breath [R06.02]  Cough [R05.9]  Anemia [D64.9]  CHF  exacerbation (HCC) [I50.9]  Acute on chronic diastolic congestive heart failure (HCC) [I50.33]  Stage 4 chronic kidney disease (HCC) [N18.4]  Age/Sex: 85 y.o. female    Network Utilization Review Department  ATTENTION: Please call with any questions or concerns to [phone] and carefully listen to the prompts so that you are directed to the right person. All voicemails are confidential.   For Discharge needs, contact Care Management DC Support Team at [phone] opt. 2  Send all requests for admission clinical reviews, approved or denied determinations and any other requests to dedicated fax number below belonging to the campus where the patient is receiving treatment. List of dedicated fax numbers for the Facilities:  FACILITY NAME UR FAX NUMBER   ADMISSION DENIALS (Administrative/Medical Necessity) [phone]   DISCHARGE SUPPORT TEAM (NETWORK) [phone]   PARENT CHILD HEALTH (Maternity/NICU/Pediatrics) [phone]   [facility] [phone]   [facility] [phone]   Critical access hospital [phone]   [facility] [phone]   [facility] [phone]   [facility] [phone]   [facility] [phone]   [facility] [phone]   [facility] [phone]   [facility] [phone]   [facility] [phone]   [facility] [phone]

## 2025-05-19 NOTE — PLAN OF CARE
Problem: PHYSICAL THERAPY ADULT  Goal: Performs mobility at highest level of function for planned discharge setting.  See evaluation for individualized goals.  Description: Treatment/Interventions: Functional transfer training, LE strengthening/ROM, Elevations, Therapeutic exercise, Endurance training, Gait training  Equipment Recommended: Walker       See flowsheet documentation for full assessment, interventions and recommendations.  Outcome: Progressing  Note: Prognosis: Fair  Problem List: Decreased strength, Decreased mobility, Impaired balance, Decreased endurance  Assessment: Pt is 85 y.o. female seen for PT evaluation s/p admit to [facility] on 5/17/2025 w/ Acute on chronic diastolic congestive heart failure (HCC). PT consulted to assess pt's functional mobility and d/c needs. Order placed for PT eval and tx, w/ out of bed to chair order. Pt agreeable to PT  session upon arrival, pt found supine in bed.  PTA, pt was independent w/ all functional mobility w/ rw and coming from rehab .  Pt to benefit from continued PT tx to address deficits and maximize level of functional independent mobility and consistency. Upon conclusion pt  seated in recliner, with all needs in reach, and chair alarm intact. Complexity: Comorbidities affecting pt's physical performance at time of assessment include: CHF and ckd, shortness of breath, anemia, cough. . Personal factors affecting pt at time of IE include: advanced age, coming from rehab, ambulating with assistive device, and steps to enter home. Please find objective findings from PT assessment regarding body systems outlined above with impairments and limitations including weakness, impaired balance, decreased endurance, decreased activity tolerance, and decreased functional mobility tolerance.  Pt's clinical presentation is currently unstable/unpredictable seen in pt's presentation of abnormal renal values, abnormal H&H, abnormal calcium levels, and new onset of  O2 use. The patient's AM-PAC Basic Mobility Inpatient Short Form Raw Score is 15 .  Based on patient presentations and impairments, pt would most appropriately benefit from Level 2 resource intensity upon discharge. A Raw score of less than or equal to 16 suggests the patient may benefit from discharge to post-acute rehabilitation services. Please also refer to the recommendation of the Physical Therapist for safe discharge planning. RN verbalized pt appropriate for PT session.        Rehab Resource Intensity Level, PT: II (Moderate Resource Intensity) (return to rehab)    See flowsheet documentation for full assessment.

## 2025-05-19 NOTE — ASSESSMENT & PLAN NOTE
Lab Results   Component Value Date    EGFR 21 05/19/2025    EGFR 27 05/18/2025    EGFR 23 05/17/2025    CREATININE 2.04 (H) 05/19/2025    CREATININE 1.68 (H) 05/18/2025    CREATININE 1.95 (H) 05/17/2025   Baseline creatinine unclear.  Last known labs from 2017 with creatinine 1.2-1.5.  No previous nephrology records available in Care Everywhere.    Etiology presumed cardiorenal pathophysiology, obesity related FSGS and age-related nephron loss.  Creatinine 1.68 mg/dL, GFR 27 mL/min.  Creatinine previously 1.95 on 5/17 and 1.31 on 5/1.  UA trace intact blood, 1+ leukocytes, 30-50 WBC, innumerable bacteria.    Will check urine creatinine, urine sodium and renal ultrasound.  Chest x-ray-wet read noting bilateral pulmonary vascular congestion, 5/17.  Volume status examines hypervolemic  Continue to avoid nephrotoxins and NSAIDs.  Avoid hypotension.  Receiving Lasix 50 mg twice daily IV.    5/19  Creatinine slightly elevated from 1.7-2.0, suspected to be on the basis of dehydration from being n.p.o. and receiving diuretics.  Status post 1 L of D5W bolus.  Continue with current management and supportive care measures

## 2025-05-19 NOTE — ASSESSMENT & PLAN NOTE
Wt Readings from Last 3 Encounters:   05/19/25 103 kg (227 lb)     EF preserved, BNP minimally elevated, though patient still examines hypervolemic  Patient declined invasive or aggressive treatments or strategies including ischemic evaluation but is agreeable to medical therapy  Continue IV furosemide 50 mg twice daily if okay with nephrology given rising creatinine  Continue metoprolol succinate 25 mg daily  Low-sodium diet, fluid restriction, daily weights on a standing scale if able, I/O

## 2025-05-19 NOTE — ASSESSMENT & PLAN NOTE
Wt Readings from Last 3 Encounters:   05/19/25 103 kg (227 lb)   EF 70%, 4/14/17. Repeat ECHO pending.  Continue management per cardiology/hospitalist.

## 2025-05-19 NOTE — MALNUTRITION/BMI
This medical record reflects one or more clinical indicators suggestive of malnutrition and/or morbid obesity.    BMI Findings:  Adult BMI Classifications: Morbid Obesity 40-44.9        Body mass index is 44.33 kg/m².     See Nutrition note dated 5/19/2025 in flow sheets for additional details.  Completed nutrition assessment is viewable in the nutrition documentation.   Yes, record another set of vital signs

## 2025-05-19 NOTE — ASSESSMENT & PLAN NOTE
Patient presents with shortness of breath and significant lower extremity edema  Arrival   Appreciate cardiology input  Patient has diuresed almost 900 cc of fluid thus far  2D echocardiogram results reviewed-EF of 55%, see the full report for additional details  Continue goal-directed medical therapy with the following medications:-  #1.  Xarelto 15 mg p.o. daily  #2.  Toprol-XL 25 mg p.o. daily  #3.  Atorvastatin 40 mg p.o. daily  #4.  Furosemide 50 mg IV twice daily  Elevated troponins-  Troponin trend as follows-53, 43  Patient declined any further invasive testing, treatment, and/or workup such as a cardiac catheterization  Most likely none myocardial injury related elevated troponins in the setting of having CHF, and chronic kidney disease  Continue to monitor daily weight, input and output  Repeat blood work in the a.m.

## 2025-05-19 NOTE — PROCEDURES
Video Swallow Study      Patient Name: Riri Cuellar  Today's Date: 5/19/2025        Past Medical History  History reviewed. No pertinent past medical history.     Past Surgical History  History reviewed. No pertinent surgical history.      General Information:       Summary:  Images are on PACS for review.     Oral Phase :  Pt presented with mild oral dysphagia. Mastication was mod prolonged as pt edentulous. Does wear upper dentures however left at facility. Bolus control, formation, and transfer were WNL. Trace base of tongue residue.     Pharyngeal Phase :  Pt presented with mild pharyngeal dysphagia . Swallow initiation was min delayed. Premature spill to the valleculae with both liquid and solids trials. Hyoid elevation, laryngeal excursion, and pharyngeal constriction were WFL. Epiglottic inversion was complete. No vallecular or pyriform retention. CP prominence evident C4- C5? Difficult to visualize exact location did not impede bolus flow. Deep penetration with thin liquids. No aspiration.     Per Esophageal screen   Slow motility noted with solids and liquids. Noted brief retropulsion following burp with thin liquids. Suspect risk of bottom up aspiration. View limited as pt sizing, repositioning ineffective.      Observations:  Did not administer pill as pt reported typically takes them crushed with puree.  No cough during VBS however when returned to room and assist with nursing back to chair HARSH coughing.     Recommendations:  Diet: Dysphagia 2 mech soft  Liquids:thin   Meds:crushed with puree (pt preference)  Strategies:SLOW rate, small sips, alternate liquids with solids, swallow prior to additional po  Upright position  F/u ST tx: yes 1-3 times weekly as indicated.   Therapy Prognosis: fair   Prognosis considerations:age, medical status   Intermittent Supervision  Aspiration Precautions  Reflux Precautions  Consider consult with: GI, pulmonary ?  Results reviewed  "with: pt, nursing, family, physician, dietician  Aspiration precautions posted.  If a dedicated assessment of the esophagus is desired, consider esophagram/barium swallow or EGD.    Penetration/Aspiration:  Thin: PAS -2  Puree: PAS-1  Solid: PAS-1  Response to Aspiration: n/A           8-Point Penetration-Aspiration Scale   1 Material does not enter the airway   2 Material enters the airway, remains above the vocal folds, and is ejected  from the  airway    3 Material enters the airway, remains above the vocal folds, and is not ejected from the airway   4 Material enters the airway, contacts the vocal folds, and is ejected from the airway   5 Material enters the airway, contacts the vocal folds, and is not ejected from the airway    6 Material enters the airway, passes below the vocal folds and is ejected into the larynx or out of the airway    7 Material enters the airway, passes below the vocal folds, and is not ejected from the trachea despite effort    8 Material enters the airway, passes below the vocal folds, and no effort is made to eject      NOMS (National Outcome Measurement System): Swallowing \"Score\"  LEVEL 5: Swallowing is safe with minimal diet restriction and/or occasionally requires minimal cueing to use compensatory strategies. The individual may occasionally self-cue. All nutrition and hydration needs are met by mouth at mealtime.     Functional Oral Intake Scale (FOIS)  Winston Luevano PhD, F-PETRA  (Does not include liquids)  Total oral diet with multiple consistencies but requiring special preparation or compensations.    Goals:  Dysphagia LTG  -Patient will demonstrate safe and effective oral intake (without overt s/s significant oral/pharyngeal dysphagia including s/s penetration or aspiration) for the highest appropriate diet level.     Short Term Goals:  -Pt will tolerate Dysphagia 2/mechanical soft diet and  thin liquid with no significant s/s oral or pharyngeal dysphagia across 1-3 diagnostic " "session/s.    -Patient will tolerate trials of upgraded food and/or liquid texture with no significant s/s of oral or pharyngeal dysphagia including aspiration across 1-3 diagnostic sessions     -Patient will comply with a Video/Modified Barium Swallow study for more complete assessment of swallowing anatomy/physiology/aspiration risk and to assess efficacy of treatment techniques    Re: Mastication  -Patient will demonstrate adequate mastication to safely consume the  least restrictive diet with no verbal, visual or tactile cues needed.    Re: Compensatory Strategies  -Patient will utilize trained compensatory strategies (eg.  controlled bite/sip size, controlled rate, ”prep set”, neck flexion, multiple swallows, alternation of food with liquid,head turn etc.) with 80% accuracy to eliminate overt s/s penetration/aspiration with the least restrictive food/liquid consistencies.    -Patient will demonstrate independent use of recommended safe swallowing strategies during a clinician assessed meal across 1-3 diagnostic sessions.    -Patient will demonstrate the ability to adequately self-monitor swallowing skills and perform appropriate compensatory techniques to reduce overt s/sx of penetration/aspiration to safely tolerate least restrictive food/liquid consistencies.    Patient's goal:\" I am hungry\"    H&P/Admit info, pertinent provider notes/procedures/studies/PMH:(copied and placed in this report)  Riri Cuellar is a 85 y.o. female with a PMH of paroxysmal atrial fibrillation on Xarelto, GERD, RA who presents with shortness of breath and lower extremity edema. Upon chart review it appears that the patient has not had labs or imaging since 2017. She states that over the course of the past few days she has become significantly short of breath and states that her legs are beginning to swell. In the ED, the patient was found to be hemodynamically stable and saturating well on room air. Hemoglobin 6.6. No signs of " active bleeding. ED provider ordered 2 units of packed red blood cells. Chest x-ray with significant cardiomegaly and pulmonary vascular congestion. NT-proBNP and troponin I pending upon admission     Special Studies   XR CHEST PORTABLE 05/17/2025  IMPRESSION:  Mild pulmonary venous congestion    Code Status: Level 3 DNR/DNI    Previous VBS: none     Precautions : Fall     Food allergies:  No known    Current diet:  Regular and thin    Premorbid diet:  Regular and thin    Dentition  Edentulous    Oral Mech  WNL   O2 requirements:  2 liters    Vocal Quality/Speech WNL   Cognitive status:  Alert      Consistencies administered: Barium laden applesauce, nutrigrain bar, thin liquid.  Liquids were administered by cup and straw.     Pt was seated laterally at 90 degrees. Unable to view in AP due to transfer status.

## 2025-05-19 NOTE — OCCUPATIONAL THERAPY NOTE
Occupational Therapy Evaluation     Patient Name: Riri Cuellar  Today's Date: 5/19/2025  Problem List  Principal Problem:    Acute on chronic diastolic congestive heart failure (HCC)  Active Problems:    Paroxysmal atrial fibrillation (HCC)    Gastroesophageal reflux disease without esophagitis    Rheumatoid arthritis (HCC)    Anemia    Stage 4 chronic kidney disease (HCC)    Dysphagia    Past Medical History  History reviewed. No pertinent past medical history.  Past Surgical History  History reviewed. No pertinent surgical history.        05/19/25 0955   OT Last Visit   OT Visit Date 05/19/25   Note Type   Note type Evaluation   Additional Comments Pt seen as a co-eval with PT due to the patient's co-morbidities, clinically unstable presentation, and present impairments which are a regression from the patient's baseline.   Pain Assessment   Pain Assessment Tool 0-10   Pain Score No Pain   Restrictions/Precautions   Other Precautions Chair Alarm;Bed Alarm;Fall Risk;Pain;O2  (2L/min, does not wear at baseline)   Home Living   Type of Home Apartment   Home Layout One level;Ramped entrance;Performs ADLs on one level   Bathroom Shower/Tub Walk-in shower   Bathroom Toilet Raised   Bathroom Equipment Grab bars in shower;Shower chair   Bathroom Accessibility Accessible   Home Equipment Walker  (Pt reports RW used at baseline)   Prior Function   Level of [place] Independent with ADLs;Independent with functional mobility;Needs assistance with IADLS   Lives With Alone   Receives Help From Family;Home health  (home health aide 5x/wk - about 3 hrs per day to assist with IADLs)   IADLs Family/Friend/Other provides medication management;Family/Friend/Other provides meals;Family/Friend/Other provides transportation   Falls in the last 6 months 1 to 4  (Pt reports x3 falls)   Vocational Retired   Comments Pt admitted to [facility] [place] for STR   Lifestyle   Autonomy Pt resides in one level apartment; I with ADLs,  "mobility and requires A with IADLs; -    Reciprocal Relationships supportive family   Service to Others retired   Subjective   Subjective \"I went to rehab for my breathing\"   ADL   Where Assessed Edge of bed   Eating Assistance 6  Modified independent   Grooming Assistance 5  Supervision/Setup   UB Bathing Assistance 5  Supervision/Setup   LB Bathing Assistance 4  Minimal Assistance   UB Dressing Assistance 5  Supervision/Setup   LB Dressing Assistance 3  Moderate Assistance   Toileting Assistance  3  Moderate Assistance   Additional Comments Given functional performance skills + medical complexity, therapist suspects via clinical judgement + skilled analysis; pt currently requires stated assist above to perform each area of ADLs d/t limitations including: functional mobility, functional activity tolerance, coordination, sitting/standing balance, problem-solving and overall Spo2   Bed Mobility   Supine to Sit 4  Minimal assistance   Additional items Assist x 1;Increased time required;Verbal cues   Sit to Supine   (DNT; pt seated in recliner upon conclusion of session)   Additional Comments VC for bedrail utilization and proper body mechanics; denied dizziness with transitional movements   Transfers   Sit to Stand 4  Minimal assistance   Additional items Assist x 1;Increased time required;Verbal cues   Stand to Sit 4  Minimal assistance   Additional items Assist x 1;Increased time required;Verbal cues   Stand pivot 4  Minimal assistance   Additional items Assist x 2;Increased time required;Verbal cues   Additional Comments RW used; VC for safe hand placement, proper body mechanics and overall RW management during directional turns. SpO2 on RA: 86% - placed on 2L during transfer, 95%   Balance   Static Sitting Fair +   Dynamic Sitting Fair   Static Standing Fair -   Dynamic Standing Fair -   Ambulatory Fair -   Activity Tolerance   Activity Tolerance Patient limited by fatigue;Patient limited by pain   Medical " Staff Made Aware Yes, CM made aware of d/c recs   Nurse Made Aware Yes, nursing staff made aware of session outcomes   RUE Assessment   RUE Assessment WFL   RUE Strength   RUE Overall Strength   (3+/5)   LUE Assessment   LUE Assessment WFL   LUE Strength   LUE Overall Strength   (3+/5)   Sensation   Light Touch No apparent deficits   Sharp/Dull No apparent deficits   Vision-Basic Assessment   Current Vision Wears glasses all the time  (+ reading glasses)   Psychosocial   Psychosocial (WDL) WDL   Cognition   Overall Cognitive Status WFL   Arousal/Participation Alert;Responsive;Cooperative   Attention Attends with cues to redirect   Orientation Level Oriented to person;Oriented to situation;Oriented to place;Disoriented to time   Memory Decreased recall of precautions;Decreased recall of recent events;Decreased long term memory   Following Commands Follows one step commands without difficulty   Comments Pt agreeable to OT evaluation, pleasant   Assessment   Limitation Decreased ADL status;Decreased UE strength;Decreased Safe judgement during ADL;Decreased endurance;Decreased self-care trans;Decreased high-level ADLs   Prognosis Good   Assessment Pt is a 85 y.o. female seen for OT evaluation s/p admit to [facility] on 5/17/2025 w/ Acute on chronic diastolic congestive heart failure (HCC).  Comorbidities affecting pt's functional performance at time of assessment include: a-fib, GERD, RA, anemia, CKD, dysphagia. Personal factors affecting pt at time of IE include:limited home support, difficulty performing ADLS, difficulty performing IADLS , limited insight into deficits, decreased initiation and engagement , health management , and environment. OT order placed to assess Pt's ADLs, cognitive status, and performance during functional tasks in order to maximize safety and independence while making most appropriate d/c recommendations. Prior to admission, pt was I with ADLs, mobility and requires A with IADLs. Upon  evaluation: the following deficits impact occupational performance: weakness, decreased strength, decreased balance, decreased tolerance, impaired attention, impaired sequencing, impaired problem solving, and decreased safety awareness. Pt's clinical presentation is currently stable  given new onset deficits that effect Pt's occupational performance and ability to safely return to PLOF including decrease activity tolerance, decrease standing balance, decrease sitting balance, decrease performance during ADL tasks, decrease safety awareness , decrease generalized strength, decrease activity engagement, decrease performance during functional transfers, and high fall risk combined with medical complications of hypertension , pain impacting overall mobility status, A-fib, abnormal CBC, wounds, decreased skin integrity, and need for input for mobility technique/safety.  Pt to benefit from continued skilled OT tx while in the hospital to address deficits as defined above and maximize level of functional independence w ADL's and functional mobility. Occupational Performance areas to address include: grooming, bathing/shower, toilet hygiene, dressing, functional mobility, community mobility, and clothing management. From OT standpoint, recommendation at time of d/c would with moderate intensity OT resources.   Goals   Patient Goals to feel better   Plan   Treatment Interventions ADL retraining;Functional transfer training;UE strengthening/ROM;Endurance training;Patient/family training;Compensatory technique education;Energy conservation   Goal Expiration Date 05/29/25   OT Treatment Day 0   OT Frequency 2-3x/wk   Discharge Recommendation   Rehab Resource Intensity Level, OT II (Moderate Resource Intensity)   Additional Comments  The patient's raw score on the AM-PAC Daily Activity inpatient short form is 16, standardized score is 35.96, less than 39.4. Patients at this level are likely to benefit from discharge with  moderate intensity OT resources. Please refer to the recommendation of the Occupational Therapist for safe discharge planning.   AM-PAC Daily Activity Inpatient   Lower Body Dressing 2   Bathing 2   Toileting 2   Upper Body Dressing 3   Grooming 3   Eating 4   Daily Activity Raw Score 16   Daily Activity Standardized Score (Calc for Raw Score >=11) 35.96   AM-PAC Applied Cognition Inpatient   Following a Speech/Presentation 3   Understanding Ordinary Conversation 4   Taking Medications 3   Remembering Where Things Are Placed or Put Away 2   Remembering List of 4-5 Errands 2   Taking Care of Complicated Tasks 2   Applied Cognition Raw Score 16   Applied Cognition Standardized Score 35.03     GOALS    Pt will achieve the following within specified time frame: LTG  Pt will achieve the following goals within 10 days    *ADL transfers with CGA for inc'd independence with ADLs/purposeful tasks    *UB ADL with (S) for inc'd independence with self cares    *LB ADL with CGA using AE prn for inc'd independence with self cares    *Toileting with CGA for clothing management and hygiene for return to PLOF with personal care    *Increase static stand balance and dyn stand balance to F for inc'd safety with standing purposeful tasks    *Increase stand tolerance x6 m for inc'd tolerance with standing purposeful tasks    *Bed mobility- (S) for inc'd independence to manage own comfort and initiate EOB & OOB purposeful tasks    *Participate in 10-15m UE therex to increase overall stamina/activity tolerance for purposeful tasks      Brook Nair MS, OTR/L

## 2025-05-19 NOTE — UTILIZATION REVIEW
NOTIFICATION OF INPATIENT ADMISSION   AUTHORIZATION REQUEST   SERVICING FACILITY:   [place] [address]  Tax ID: [id number]  NPI: [id number]   ATTENDING PROVIDER:  Attending Name and NPI#: Elaine Lucas Md [[id number]]  Address: [address] [place] [address]  Phone: [phone]     ADMISSION INFORMATION:  Place of Service: Inpatient Acute [facility]  Place of Service Code: 21  Inpatient Admission Date/Time: 5/17/25  5:51 PM  Discharge Date/Time: No discharge date for patient encounter.  Admitting Diagnosis Code/Description:  Shortness of breath [R06.02]  Cough [R05.9]  Anemia [D64.9]  CHF exacerbation (HCC) [I50.9]  Acute on chronic diastolic congestive heart failure (HCC) [I50.33]  Stage 4 chronic kidney disease (HCC) [N18.4]     UTILIZATION REVIEW CONTACT:  [facility] Utilization   Network Utilization Review Department  Phone: [phone]  Fax: [phone]  Email: [email or web address]  Contact for approvals/pending authorizations, clinical reviews, and discharge.     PHYSICIAN ADVISORY SERVICES:  Medical Necessity Denial & [id number]-ub-[id number] Review  Phone: [phone]  Fax: [phone]  Email: [email or web address]     DISCHARGE SUPPORT TEAM:  For Patients Discharge Needs & Updates  Phone: [phone] opt. 2 Fax: [phone]  Email: [email or web address]

## 2025-05-19 NOTE — ASSESSMENT & PLAN NOTE
Rate controlled  Continue metoprolol 25 mg daily  Continue to monitor on Xarelto.  Hemoglobin stable, patient denies bleeding

## 2025-05-19 NOTE — PROGRESS NOTES
Progress Note - Cardiology   Name: Riri Cuellar 85 y.o. female I MRN: [id number]  Unit/Bed#: -01 I Date of Admission: 5/17/2025   Date of Service: 5/19/2025 I Hospital Day: 2    Assessment & Plan  Acute on chronic diastolic congestive heart failure (HCC)  Wt Readings from Last 3 Encounters:   05/19/25 103 kg (227 lb)     EF preserved, BNP minimally elevated, though patient still examines hypervolemic  Patient declined invasive or aggressive treatments or strategies including ischemic evaluation but is agreeable to medical therapy  Continue IV furosemide 50 mg twice daily if okay with nephrology given rising creatinine  Continue metoprolol succinate 25 mg daily  Low-sodium diet, fluid restriction, daily weights on a standing scale if able, I/O  Paroxysmal atrial fibrillation (HCC)  Rate controlled  Continue metoprolol 25 mg daily  Continue to monitor on Xarelto.  Hemoglobin stable, patient denies bleeding  Anemia  Presenting hemoglobin 6.6  S/p transfusion  Hgb stable, 8.4 today  Stage 4 chronic kidney disease (HCC)  Lab Results   Component Value Date    EGFR 21 05/19/2025    EGFR 27 05/18/2025    EGFR 23 05/17/2025    CREATININE 2.04 (H) 05/19/2025    CREATININE 1.68 (H) 05/18/2025    CREATININE 1.95 (H) 05/17/2025     Creatinine increasing though still examines hypervolemic  Suspect that she would benefit from continued diuretics, though will defer final decision to nephrology team.  Dysphagia  Evaluation per medical team    Outpatient Cardiologist: None      Subjective:   Patient seen and examined.  No significant events overnight.    No specific cardiac concerns.  Reports pain all over her body.    Summary comments:  Presented with shortness of breath and lower extremity edema.  Evidence of volume overload on admission.    Currently receiving Lasix 50 mg IV twice daily.  Weight is down several pounds since admission, net negative under 1 L.  Renal function is worsening, however patient still appears  volume overloaded.    Suspect the patient will benefit from further diuresis despite rising creatinine, however will defer final decision regarding diuretics to nephrology team  .  Telemetry/ECG/Cardiac testing:   Echo 5/19/2025  EF 55%  Mild DEVONTE  Mild to moderate AI, moderate AS, peak velocity 3.01 m/s  Moderate MR, mild to moderate TR    Echo 4/14/2017 ([facility])  EF 70%  Mild LAE  Moderate AI  Mild TR    Vitals: Blood pressure 103/64, pulse 68, temperature 97.5 °F (36.4 °C), temperature source Oral, resp. rate 14, height 5' (1.524 m), weight 103 kg (227 lb), SpO2 95%.,   Orthostatic Blood Pressures      Flowsheet Row Most Recent Value   Blood Pressure 103/64 filed at 05/19/2025 0733   Patient Position - Orthostatic VS Lying filed at 05/19/2025 0733        ,   Weight (last 2 days)       Date/Time Weight    05/19/25 0646 103 (227)    05/19/25 0532 103 (227.29)    05/19/25 0500 103 (227.29)    05/18/25 0600 105 (231.04)    05/17/25 1542 102 (225.31)            Physical Exam:    General:  Normal appearance in no distress.  Eyes:  Anicteric.  Oral mucosa:  Moist.  Neck:  No JVD. Carotid upstrokes are brisk without bruits.  No masses.  Chest:  Clear to auscultation   Cardiac:  No palpable PMI.  Normal S1 and S2.  3/6 systolic murmur Abdomen: Obese, soft and nontender.   Extremities: +3 bilateral lower extremity edema  Neuro:  Grossly symmetric.  Psych:  Alert and oriented x3.      Medications:    Current Medications[1]     Labs & Results:  Labs reviewed and prominent abnormalities reviewed above and/or below.  Troponins:    Results from last 7 days   Lab Units 05/17/25  2211 05/17/25  1551   HS TNI 0HR ng/L  --  53*   HS TNI 2HR ng/L 43  --    HSTNI D2 ng/L -10  --         BNP:   Results from last 6 Months   Lab Units 05/17/25  1551   BNP pg/mL 233*                    [1]   Current Facility-Administered Medications:     allopurinol (ZYLOPRIM) tablet 100 mg, 100 mg, Oral, Daily, Juan A Shirley PA-C    atorvastatin  (LIPITOR) tablet 40 mg, 40 mg, Oral, Daily With Dinner, Juan A Fowler DO, 40 mg at 05/17/25 2044    dextrose 5 % bolus 1,000 mL, 1,000 mL, Intravenous, Once, Eamon Castillo DO    doxycycline hyclate (VIBRAMYCIN) capsule 100 mg, 100 mg, Oral, Q12H MICHAEL, Juan A Shinerty, DO, 100 mg at 05/18/25 2112    furosemide (LASIX) injection 50 mg, 50 mg, Intravenous, BID, Juan A Fowler DO, 50 mg at 05/18/25 1911    metoprolol succinate (TOPROL-XL) 24 hr tablet 25 mg, 25 mg, Oral, Daily, Juan A Fowler DO    nystatin (MYCOSTATIN) powder, , Topical, BID, Juan A Fowler DO, Given at 05/18/25 1929    ondansetron (ZOFRAN) injection 4 mg, 4 mg, Intravenous, Q6H PRN, Juan A Fowler DO    pantoprazole (PROTONIX) EC tablet 40 mg, 40 mg, Oral, Early Morning, Juan A Fowler, , 40 mg at 05/18/25 0625    rivaroxaban (XARELTO) tablet 15 mg, 15 mg, Oral, Daily With Dinner, Juan A Fowler DO

## 2025-05-19 NOTE — SPEECH THERAPY NOTE
Speech Language/Pathology  Speech/Language Pathology  Assessment    Patient Name: Riri Cuellar  Today's Date: 5/19/2025     Problem List  Principal Problem:    Acute on chronic diastolic congestive heart failure (HCC)  Active Problems:    Paroxysmal atrial fibrillation (HCC)    Gastroesophageal reflux disease without esophagitis    Rheumatoid arthritis (HCC)    Anemia    Stage 4 chronic kidney disease (HCC)    Past Medical History  History reviewed. No pertinent past medical history.  Past Surgical History  History reviewed. No pertinent surgical history.     Bedside Swallow Evaluation:    Summary:  Pt presented w/ oral stage WFL for trials and suspect mild-mod pharyngeal stage dysphagia. Positioned upright and alert. Spoke to nursing reported day prior significant coughing with intake, regurgitation, and destatting . Pt is [place]. Audibly wheezing at bedside. Trialed with ice chips and sips of thin. Bolus control, formation, and transfer were WNL. Swallows appeared prompt. Intermittent delayed cough increased wheezing however SpO2 remained at 100%. X1 belch. Stated she typically takes her medications crushed with puree. Denied hx of acid reflux. Reviewed recommendations for VBS to further assess pt understood. She denied difficulties with chewing and swallowing    Recommendations:  Diet: NPO pending VBS  Supervision: Full   Positioning:Upright  Strategies: pending VBS   Pt to take PO/Meds only when fully alert and upright.   Oral care:  Aspiration precautions  Reflux precautions  Therapy Prognosis:fair   Prognosis considerations:age, medical status   Frequency: pending VBS     Consider consult w/:  Rehab  Nutrition    Goal(s):  Dysphagia LTG  -Patient will demonstrate safe and effective oral intake (without overt s/s significant oral/pharyngeal dysphagia including s/s penetration or aspiration) for the highest appropriate diet level.     1.Pt will tolerate least restrictive diet w/out s/s aspiration or  "oral/pharyngeal difficulties.   2.Pt will will effectively manipulate/masticate and transfer purees/solids w/out s/s dysphagia/aspiration.   3.Pt will tolerate thin liquids w/out s/s aspiration.   -If indicated, patient will comply with a Video/Modified Barium Swallow study for more complete assessment of swallowing anatomy/physiology/aspiration risk and to assess efficacy of treatment techniques so as to best guide treatment plan      H&P/Admit info/ pertinent provider notes: (PMH noted above)  Riri Cuellar is a 85 y.o. female with a PMH of paroxysmal atrial fibrillation on Xarelto, GERD, RA who presents with shortness of breath and lower extremity edema.  Upon chart review it appears that the patient has not had labs or imaging since 2017.  She states that over the course of the past few days she has become significantly short of breath and states that her legs are beginning to swell.  In the ED, the patient was found to be hemodynamically stable and saturating well on room air.  Hemoglobin 6.6.  No signs of active bleeding.  ED provider ordered 2 units of packed red blood cells.  Chest x-ray with significant cardiomegaly and pulmonary vascular congestion.  NT-proBNP and troponin I pending upon admission     Special Studies:  XR CHEST PORTABLE 05/17/2025  IMPRESSION:  Mild pulmonary venous congestion    Procalcitonin:    WBC: 5.04      05/19/2025     Code Status : Level 3 DNR/DNI     Previous MBS: none     Patient's goal: \"okay\"    Did the pt report pain? no  If yes, was nursing notified/was it addressed? N/a    Reason for consult:  R/o aspiration  Determine safest and least restrictive diet  Nursing reported cough w/ PO  poor intake    Precautions:  Aspiration  Fall    Food Allergies: No know    Current Diet: NPO    Premorbid diet: Regular and thin    O2 requirement: Room air    Social/Prior living Lives in facility    Voice/Speech: WNL   Follows commands: Basic    Cognitive status: Alert      Oral mech " exam:  Dentition:Edentulous, stated she does have upper denture but left at facility   Lips (VII):WNL  Tongue (XII): midline   Mandible (V):adequate ROM   Face/oral sensation (V):WNL  Velum (X):WNL    Items administered:  Ice chips and sips of thin      Oral stage:  Lip closure:WNL  Mastication: DNT  Bolus formation:WNL  Bolus control:WNL  Transfer:WNL    Pharyngeal stage:  Swallow promptness: prompt   Laryngeal rise:adequate   Wet voice: present   Cough: intermittent with trials     Esophageal stage:  No s/s reported  H/o GERD    Results d/w:  Pt, nursing, physician,    Time In:9:13  Time Out: 9:24

## 2025-05-19 NOTE — PLAN OF CARE
Problem: PAIN - ADULT  Goal: Verbalizes/displays adequate comfort level or baseline comfort level  Description: Interventions:  - Encourage patient to monitor pain and request assistance  - Assess pain using appropriate pain scale  - Administer analgesics as ordered based on type and severity of pain and evaluate response  - Implement non-pharmacological measures as appropriate and evaluate response  - Consider cultural and social influences on pain and pain management  - Notify physician/advanced practitioner if interventions unsuccessful or patient reports new pain  - Educate patient/family on pain management process including their role and importance of  reporting pain   - Provide non-pharmacologic/complimentary pain relief interventions  Outcome: Progressing     Problem: INFECTION - ADULT  Goal: Absence or prevention of progression during hospitalization  Description: INTERVENTIONS:  - Assess and monitor for signs and symptoms of infection  - Monitor lab/diagnostic results  - Monitor all insertion sites, i.e. indwelling lines, tubes, and drains  - Monitor endotracheal if appropriate and nasal secretions for changes in amount and color  - [place] appropriate cooling/warming therapies per order  - Administer medications as ordered  - Instruct and encourage patient and family to use good hand hygiene technique  - Identify and instruct in appropriate isolation precautions for identified infection/condition  Outcome: Progressing     Problem: Impaired Gas Exchange  Goal: Optimize oxygenation and ensure adequate ventilation  Description: INTERVENTIONS: Monitor for signs and symptoms of respiratory distress                - Elevate HOB or use high fowlers to promote lung expansion                - Administer oxygen as ordered to maintain adequate oxygenation                - Encourage use of IS to promote lung expansion and prevent PN                - Monitor ABGs to assess oxygenation status                -  Monitor blood oxygen level to maintain adequate oxygenation                - Encourage cough and deep breathing exercises to promote lung expansion                - Monitor patient's mental status for increased confusion    Outcome: Progressing

## 2025-05-19 NOTE — ASSESSMENT & PLAN NOTE
Lab Results   Component Value Date    EGFR 21 05/19/2025    EGFR 27 05/18/2025    EGFR 23 05/17/2025    CREATININE 2.04 (H) 05/19/2025    CREATININE 1.68 (H) 05/18/2025    CREATININE 1.95 (H) 05/17/2025   Unclear baseline as patient has not had BMP since 2017  Creatinine 1.95 on presentation  Possibly secondary to cardiorenal syndrome in the setting of significant volume overload  IV diuresis as above  Mild bump in creatinine noted today  Defer additional management to nephrology

## 2025-05-19 NOTE — PHYSICAL THERAPY NOTE
PHYSICAL THERAPY EVALUATION  NAME:  Riri Cuellar  DATE: 05/19/25    AGE:   85 y.o.  Mrn:   [id number]  ADMIT DX:  Shortness of breath [R06.02]  Cough [R05.9]  Anemia [D64.9]  CHF exacerbation (HCC) [I50.9]  Acute on chronic diastolic congestive heart failure (HCC) [I50.33]  Stage 4 chronic kidney disease (HCC) [N18.4]  Problem List: Problem List[1]    Past Medical History  History reviewed. No pertinent past medical history.    Past Surgical History  History reviewed. No pertinent surgical history.    Length Of Stay: 2  Performed at least 2 patient identifiers during session: Name and Epic photo       05/19/25 1008   PT Last Visit   PT Visit Date 05/19/25   Note Type   Note type Evaluation   Pain Assessment   Pain Assessment Tool 0-10   Pain Score No Pain   Restrictions/Precautions   Weight Bearing Precautions Per Order No   Other Precautions Chair Alarm;Bed Alarm;O2;Fall Risk;Pain  (2L, does not wear O2 at baseline)   Home Living   Type of Home Apartment   Home Layout One level;Ramped entrance;Performs ADLs on one level   Bathroom Shower/Tub Walk-in shower   Bathroom Toilet Raised   Bathroom Equipment Grab bars in shower;Shower chair   Bathroom Accessibility Accessible   Home Equipment Walker  (rw used at baseline)   Prior Function   Level of [place] Independent with ADLs;Independent with functional mobility;Needs assistance with IADLS   Lives With Alone   Receives Help From Family;Home health  (home health aide 5x a week, about 3 hrs a day to assist with ADLs)   IADLs Family/Friend/Other provides medication management;Family/Friend/Other provides meals;Family/Friend/Other provides transportation   Falls in the last 6 months 1 to 4  (3)   Vocational Retired   Cognition   Overall Cognitive Status WFL   Arousal/Participation Alert   Orientation Level Oriented to person;Oriented to situation;Oriented to place;Disoriented to time   Memory Within functional limits   Following Commands Follows all commands and  "directions without difficulty   Subjective   Subjective \"I just have been so wheezy\"   RLE Assessment   RLE Assessment   (4-/5)   LLE Assessment   LLE Assessment   (4-/5)   Vision-Basic Assessment   Current Vision Wears glasses all the time   Coordination   Sensation WFL   Bed Mobility   Supine to Sit 4  Minimal assistance   Additional items Assist x 1;Increased time required   Sit to Supine   (dnt, pt is in recliner)   Additional Comments vc for hand placement and safety awareness   Transfers   Sit to Stand 4  Minimal assistance   Additional items Assist x 1;Increased time required;Armrests;Verbal cues   Stand to Sit 4  Minimal assistance   Additional items Assist x 1;Increased time required   Stand pivot 4  Minimal assistance   Additional items Assist x 1;Impulsive;Verbal cues   Additional Comments rw used, O2 stayed within normal limits, verbal cues for sequencing   Balance   Static Sitting Fair +   Dynamic Sitting Fair   Static Standing Fair -   Dynamic Standing Fair -   Ambulatory Fair -   Activity Tolerance   Activity Tolerance Patient limited by pain;Patient limited by fatigue   Medical Staff Made Aware cm made aware   Nurse Made Aware rn alex   Assessment   Prognosis Fair   Problem List Decreased strength;Decreased mobility;Impaired balance;Decreased endurance   Assessment Pt is 85 y.o. female seen for PT evaluation s/p admit to [facility] on 5/17/2025 w/ Acute on chronic diastolic congestive heart failure (HCC). PT consulted to assess pt's functional mobility and d/c needs. Order placed for PT eval and tx, w/ out of bed to chair order. Pt agreeable to PT  session upon arrival, pt found supine in bed.  PTA, pt was independent w/ all functional mobility w/ rw and coming from rehab .  Pt to benefit from continued PT tx to address deficits and maximize level of functional independent mobility and consistency. Upon conclusion pt  seated in recliner, with all needs in reach, and chair alarm intact. " Complexity: Comorbidities affecting pt's physical performance at time of assessment include: CHF and ckd, shortness of breath, anemia, cough. . Personal factors affecting pt at time of IE include: advanced age, coming from rehab, ambulating with assistive device, and steps to enter home. Please find objective findings from PT assessment regarding body systems outlined above with impairments and limitations including weakness, impaired balance, decreased endurance, decreased activity tolerance, and decreased functional mobility tolerance.  Pt's clinical presentation is currently unstable/unpredictable seen in pt's presentation of abnormal renal values, abnormal H&H, abnormal calcium levels, and new onset of O2 use. The patient's AM-PAC Basic Mobility Inpatient Short Form Raw Score is 15 .  Based on patient presentations and impairments, pt would most appropriately benefit from Level 2 resource intensity upon discharge. A Raw score of less than or equal to 16 suggests the patient may benefit from discharge to post-acute rehabilitation services. Please also refer to the recommendation of the Physical Therapist for safe discharge planning. RN verbalized pt appropriate for PT session.   Goals   Patient Goals to get better   LTG Expiration Date 05/29/25   Long Term Goal #1 Pt will: Perform bed mobility tasks to modified I to increase Indep in home environment and improve ease of bed mobility. Perform transfers to modified I to increase Indep in home environment, decrease risk for falls, and improve ease of transfers. Perform ambulation with rw for 50 ft to  increase Indep in home environment, decrease risk for falls, improve activity tolerance, and improve gait quality. Increase dynamic standing balance to F+ to decrease fall risk.   Increase OOB activity tolerance to 10 minutes without s/s of exertion to decrease fall risk.   PT Treatment Day 0   Plan   Treatment/Interventions Functional transfer training;LE  strengthening/ROM;Elevations;Therapeutic exercise;Endurance training;Gait training   PT Frequency 3-5x/wk   Discharge Recommendation   Rehab Resource Intensity Level, PT II (Moderate Resource Intensity)  (return to rehab)   Equipment Recommended Walker   AM-PAC Basic Mobility Inpatient   Turning in Flat Bed Without Bedrails 3   Lying on Back to Sitting on Edge of Flat Bed Without Bedrails 3   Moving Bed to Chair 3   Standing Up From Chair Using Arms 3   Walk in Room 2   Climb 3-5 Stairs With Railing 1   Basic Mobility Inpatient Raw Score 15   Basic Mobility Standardized Score 36.97   [facility] Highest Level Of Mobility   -[facility] Goal 4: Move to chair/commode   -HL Achieved 4: Move to chair/commode     Pt seen as a co-eval with OT due to the patient's co-morbidities, clinically unstable presentation, and present impairments which are a regression from the patient's baseline.       Time In: 0954  Time Out: 1008  Total Evaluation Minutes: 14    Yovani Gonzales PT         [1]   Patient Active Problem List  Diagnosis    Acute on chronic diastolic congestive heart failure (HCC)    Paroxysmal atrial fibrillation (HCC)    Gastroesophageal reflux disease without esophagitis    Rheumatoid arthritis (HCC)    Anemia    Stage 4 chronic kidney disease (HCC)    Dysphagia

## 2025-05-20 LAB
ANION GAP SERPL CALCULATED.3IONS-SCNC: 6 MMOL/L (ref 4–13)
BACTERIA UR CULT: ABNORMAL
BASOPHILS # BLD AUTO: 0.03 THOUSANDS/ÂΜL (ref 0–0.1)
BASOPHILS NFR BLD AUTO: 1 % (ref 0–1)
BUN SERPL-MCNC: 44 MG/DL (ref 5–25)
CALCIUM SERPL-MCNC: 9 MG/DL (ref 8.4–10.2)
CHLORIDE SERPL-SCNC: 105 MMOL/L (ref 96–108)
CO2 SERPL-SCNC: 29 MMOL/L (ref 21–32)
CREAT SERPL-MCNC: 1.81 MG/DL (ref 0.6–1.3)
EOSINOPHIL # BLD AUTO: 0.16 THOUSAND/ÂΜL (ref 0–0.61)
EOSINOPHIL NFR BLD AUTO: 3 % (ref 0–6)
ERYTHROCYTE [DISTWIDTH] IN BLOOD BY AUTOMATED COUNT: 21 % (ref 11.6–15.1)
FOLATE SERPL-MCNC: >22.3 NG/ML
GFR SERPL CREATININE-BSD FRML MDRD: 25 ML/MIN/1.73SQ M
GLUCOSE SERPL-MCNC: 84 MG/DL (ref 65–140)
HCT VFR BLD AUTO: 33.7 % (ref 34.8–46.1)
HGB BLD-MCNC: 10 G/DL (ref 11.5–15.4)
IMM GRANULOCYTES # BLD AUTO: 0.02 THOUSAND/UL (ref 0–0.2)
IMM GRANULOCYTES NFR BLD AUTO: 0 % (ref 0–2)
LYMPHOCYTES # BLD AUTO: 1.09 THOUSANDS/ÂΜL (ref 0.6–4.47)
LYMPHOCYTES NFR BLD AUTO: 19 % (ref 14–44)
MCH RBC QN AUTO: 28.5 PG (ref 26.8–34.3)
MCHC RBC AUTO-ENTMCNC: 29.7 G/DL (ref 31.4–37.4)
MCV RBC AUTO: 96 FL (ref 82–98)
MONOCYTES # BLD AUTO: 0.44 THOUSAND/ÂΜL (ref 0.17–1.22)
MONOCYTES NFR BLD AUTO: 8 % (ref 4–12)
NEUTROPHILS # BLD AUTO: 3.91 THOUSANDS/ÂΜL (ref 1.85–7.62)
NEUTS SEG NFR BLD AUTO: 69 % (ref 43–75)
NRBC BLD AUTO-RTO: 0 /100 WBCS
PLATELET # BLD AUTO: 199 THOUSANDS/UL (ref 149–390)
PMV BLD AUTO: 11 FL (ref 8.9–12.7)
POTASSIUM SERPL-SCNC: 3.9 MMOL/L (ref 3.5–5.3)
RBC # BLD AUTO: 3.51 MILLION/UL (ref 3.81–5.12)
SODIUM SERPL-SCNC: 140 MMOL/L (ref 135–147)
VIT B12 SERPL-MCNC: 680 PG/ML (ref 180–914)
WBC # BLD AUTO: 5.65 THOUSAND/UL (ref 4.31–10.16)

## 2025-05-20 PROCEDURE — 85025 COMPLETE CBC W/AUTO DIFF WBC: CPT | Performed by: HOSPITALIST

## 2025-05-20 PROCEDURE — 99222 1ST HOSP IP/OBS MODERATE 55: CPT | Performed by: STUDENT IN AN ORGANIZED HEALTH CARE EDUCATION/TRAINING PROGRAM

## 2025-05-20 PROCEDURE — 99232 SBSQ HOSP IP/OBS MODERATE 35: CPT | Performed by: INTERNAL MEDICINE

## 2025-05-20 PROCEDURE — 97110 THERAPEUTIC EXERCISES: CPT

## 2025-05-20 PROCEDURE — 82607 VITAMIN B-12: CPT | Performed by: STUDENT IN AN ORGANIZED HEALTH CARE EDUCATION/TRAINING PROGRAM

## 2025-05-20 PROCEDURE — 99232 SBSQ HOSP IP/OBS MODERATE 35: CPT

## 2025-05-20 PROCEDURE — 99232 SBSQ HOSP IP/OBS MODERATE 35: CPT | Performed by: HOSPITALIST

## 2025-05-20 PROCEDURE — 97530 THERAPEUTIC ACTIVITIES: CPT

## 2025-05-20 PROCEDURE — 80048 BASIC METABOLIC PNL TOTAL CA: CPT | Performed by: HOSPITALIST

## 2025-05-20 PROCEDURE — 82746 ASSAY OF FOLIC ACID SERUM: CPT | Performed by: STUDENT IN AN ORGANIZED HEALTH CARE EDUCATION/TRAINING PROGRAM

## 2025-05-20 RX ORDER — HYDROCODONE BITARTRATE AND HOMATROPINE METHYLBROMIDE ORAL SOLUTION 5; 1.5 MG/5ML; MG/5ML
5 LIQUID ORAL EVERY 4 HOURS PRN
Refills: 0 | Status: DISCONTINUED | OUTPATIENT
Start: 2025-05-20 | End: 2025-05-22 | Stop reason: HOSPADM

## 2025-05-20 RX ADMIN — HYDROCODONE BITARTRATE AND HOMATROPINE METHYLBROMIDE 5 ML: 1.5; 5 SOLUTION ORAL at 00:28

## 2025-05-20 RX ADMIN — NYSTATIN: 100000 POWDER TOPICAL at 09:58

## 2025-05-20 RX ADMIN — NYSTATIN: 100000 POWDER TOPICAL at 17:12

## 2025-05-20 RX ADMIN — RIVAROXABAN 15 MG: 15 TABLET, FILM COATED ORAL at 17:13

## 2025-05-20 RX ADMIN — FUROSEMIDE 50 MG: 10 INJECTION, SOLUTION INTRAMUSCULAR; INTRAVENOUS at 17:13

## 2025-05-20 RX ADMIN — PANTOPRAZOLE SODIUM 40 MG: 40 TABLET, DELAYED RELEASE ORAL at 06:32

## 2025-05-20 RX ADMIN — ATORVASTATIN CALCIUM 40 MG: 40 TABLET, FILM COATED ORAL at 17:13

## 2025-05-20 RX ADMIN — ALLOPURINOL 100 MG: 100 TABLET ORAL at 09:57

## 2025-05-20 RX ADMIN — FUROSEMIDE 50 MG: 10 INJECTION, SOLUTION INTRAMUSCULAR; INTRAVENOUS at 10:00

## 2025-05-20 RX ADMIN — ACETAMINOPHEN 650 MG: 325 TABLET ORAL at 17:19

## 2025-05-20 NOTE — DISCHARGE SUPPORT
Case Management Assessment & Discharge Planning Note    Patient name Riri Cuellar  Location /-01 MRN [id number]  : 1940 Date 2025       Current Admission Date: 2025  Current Admission Diagnosis:Acute on chronic diastolic congestive heart failure (HCC)   Patient Active Problem List    Diagnosis Date Noted    Dysphagia 2025    Acute on chronic diastolic congestive heart failure (HCC) 2025    Paroxysmal atrial fibrillation (HCC) 2025    Gastroesophageal reflux disease without esophagitis 2025    Rheumatoid arthritis (HCC) 2025    Anemia 2025    Stage 4 chronic kidney disease (HCC) 2025      LOS (days): 3  Geometric Mean LOS (GMLOS) (days): 3  Days to GMLOS:0.1   Facility Authorization Initiated  [facility] received request for auth from : Sia OROZCO  Authorization Request Submitted for: SNF  Requested Start of Care Date: 25  Facility Name: [facility]  Facility NPI: [id number]  Facility MD: Dr. Bentley Rankin  Facility MD NPI: [id number]  Authorization initiated by contacting insurance: [employer]  Via: [employer]  Clinicals submitted via: Portal Attachment  Pending reference #: [id number]   notified: Sia OROZCO     Updates to authorization status will be noted in chart.    Please reach out to CM for updates on any clinical information.

## 2025-05-20 NOTE — ASSESSMENT & PLAN NOTE
Lab Results   Component Value Date    EGFR 25 05/20/2025    EGFR 21 05/19/2025    EGFR 27 05/18/2025    CREATININE 1.81 (H) 05/20/2025    CREATININE 2.04 (H) 05/19/2025    CREATININE 1.68 (H) 05/18/2025   Unclear baseline as patient has not had BMP since 2017  Creatinine 1.95 on presentation  Possibly secondary to cardiorenal syndrome in the setting of significant volume overload  IV diuresis as above  Creatinine down to 1.81  Defer additional management to nephrology

## 2025-05-20 NOTE — ASSESSMENT & PLAN NOTE
Hemoglobin was 6.6 at time of arrival which might have been a contributing factor to her initial complaint of shortness of breath   Patient has been transfused 2 units of packed red blood cells on this admission   Follow-up hemoglobins have remained stable  Suspect that this is multifactorial in the setting of having anemia of chronic kidney disease, rheumatoid arthritis and iron deficiency anemia  Patient denies any visible blood loss, denies hematemesis, hemoptysis, and/or blood loss per rectum hemoglobin 6.6 on presentation which improved to 8.0 status post 2 units of packed red blood cells  Hold off on IV Venofer in the setting of significant volume overload  Continue to monitor H&H

## 2025-05-20 NOTE — ASSESSMENT & PLAN NOTE
Lab Results   Component Value Date    EGFR 25 05/20/2025    EGFR 21 05/19/2025    EGFR 27 05/18/2025    CREATININE 1.81 (H) 05/20/2025    CREATININE 2.04 (H) 05/19/2025    CREATININE 1.68 (H) 05/18/2025   Baseline creatinine unclear.  Last known labs from 2017 with creatinine 1.2-1.5.  No previous nephrology records available in Care Everywhere.    Etiology presumed cardiorenal pathophysiology, obesity related FSGS and age-related nephron loss.  Creatinine 1.68 mg/dL, GFR 27 mL/min.  Creatinine previously 1.95 on 5/17 and 1.31 on 5/1.  UA trace intact blood, 1+ leukocytes, 30-50 WBC, innumerable bacteria.    Will check urine creatinine, urine sodium and renal ultrasound.  Chest x-ray-wet read noting bilateral pulmonary vascular congestion, 5/17.  Volume status examines hypervolemic  Continue to avoid nephrotoxins and NSAIDs.  Avoid hypotension.  Receiving Lasix 50 mg twice daily IV.    5/19  Creatinine slightly elevated from 1.7-2.0, suspected to be on the basis of dehydration from being n.p.o. and receiving diuretics.  Status post 1 L of D5W bolus.  Continue with current management and supportive care measures    5/20  The creatinine is slightly better today after a liter of fluids today, sodium level is also improved. She was encouraged to stay well hydrated as she is undergoing an aggressive diuresis for appropriate reasons as noted in the principal problem.

## 2025-05-20 NOTE — PLAN OF CARE
Problem: PAIN - ADULT  Goal: Verbalizes/displays adequate comfort level or baseline comfort level  Description: Interventions:  - Encourage patient to monitor pain and request assistance  - Assess pain using appropriate pain scale  - Administer analgesics as ordered based on type and severity of pain and evaluate response  - Implement non-pharmacological measures as appropriate and evaluate response  - Consider cultural and social influences on pain and pain management  - Notify physician/advanced practitioner if interventions unsuccessful or patient reports new pain  - Educate patient/family on pain management process including their role and importance of  reporting pain   - Provide non-pharmacologic/complimentary pain relief interventions  Outcome: Progressing     Problem: INFECTION - ADULT  Goal: Absence or prevention of progression during hospitalization  Description: INTERVENTIONS:  - Assess and monitor for signs and symptoms of infection  - Monitor lab/diagnostic results  - Monitor all insertion sites, i.e. indwelling lines, tubes, and drains  - Monitor endotracheal if appropriate and nasal secretions for changes in amount and color  - [place] appropriate cooling/warming therapies per order  - Administer medications as ordered  - Instruct and encourage patient and family to use good hand hygiene technique  - Identify and instruct in appropriate isolation precautions for identified infection/condition  Outcome: Progressing  Goal: Absence of fever/infection during neutropenic period  Description: INTERVENTIONS:  - Monitor WBC  - Perform strict hand hygiene  - Limit to healthy visitors only  - No plants, dried, fresh or silk flowers with asif in patient room  Outcome: Progressing     Problem: SAFETY ADULT  Goal: Patient will remain free of falls  Description: INTERVENTIONS:  - Educate patient/family on patient safety including physical limitations  - Instruct patient to call for assistance with activity   -  Consider consulting OT/PT to assist with strengthening/mobility based on AM PAC & JH-HLM score  - Consult OT/PT to assist with strengthening/mobility   - Keep Call bell within reach  - Keep bed low and locked with side rails adjusted as appropriate  - Keep care items and personal belongings within reach  - Initiate and maintain comfort rounds  - Make Fall Risk Sign visible to staff  - Offer Toileting every  Hours, in advance of need  - Initiate/Maintain alarm  - Obtain necessary fall risk management equipment:   - Apply yellow socks and bracelet for high fall risk patients  - Consider moving patient to room near nurses station  Outcome: Progressing  Goal: Maintain or return to baseline ADL function  Description: INTERVENTIONS:  -  Assess patient's ability to carry out ADLs; assess patient's baseline for ADL function and identify physical deficits which impact ability to perform ADLs (bathing, care of mouth/teeth, toileting, grooming, dressing, etc.)  - Assess/evaluate cause of self-care deficits   - Assess range of motion  - Assess patient's mobility; develop plan if impaired  - Assess patient's need for assistive devices and provide as appropriate  - Encourage maximum independence but intervene and supervise when necessary  - Involve family in performance of ADLs  - Assess for home care needs following discharge   - Consider OT consult to assist with ADL evaluation and planning for discharge  - Provide patient education as appropriate  - Monitor functional capacity and physical performance, use of AM PAC & JH-HLM   - Monitor gait, balance and fatigue with ambulation    Outcome: Progressing  Goal: Maintains/Returns to pre admission functional level  Description: INTERVENTIONS:  - Perform AM-PAC 6 Click Basic Mobility/ Daily Activity assessment daily.  - Set and communicate daily mobility goal to care team and patient/family/caregiver.   - Collaborate with rehabilitation services on mobility goals if consulted  -  Perform Range of Motion  times a day.  - Reposition patient every  hours.  - Dangle patient  times a day  - Stand patient  times a day  - Ambulate patient  times a day  - Out of bed to chair  times a day   - Out of bed for meals  times a day  - Out of bed for toileting  - Record patient progress and toleration of activity level   Outcome: Progressing     Problem: DISCHARGE PLANNING  Goal: Discharge to home or other facility with appropriate resources  Description: INTERVENTIONS:  - Identify barriers to discharge w/patient and caregiver  - Arrange for needed discharge resources and transportation as appropriate  - Identify discharge learning needs (meds, wound care, etc.)  - Arrange for interpretive services to assist at discharge as needed  - Refer to Case Management Department for coordinating discharge planning if the patient needs post-hospital services based on physician/advanced practitioner order or complex needs related to functional status, cognitive ability, or social support system  Outcome: Progressing     Problem: Knowledge Deficit  Goal: Patient/family/caregiver demonstrates understanding of disease process, treatment plan, medications, and discharge instructions  Description: Complete learning assessment and assess knowledge base.  Interventions:  - Provide teaching at level of understanding  - Provide teaching via preferred learning methods  Outcome: Progressing     Problem: Prexisting or High Potential for Compromised Skin Integrity  Goal: Skin integrity is maintained or improved  Description: INTERVENTIONS:  - Identify patients at risk for skin breakdown  - Assess and monitor skin integrity including under and around medical devices   - Assess and monitor nutrition and hydration status  - Monitor labs  - Assess for incontinence   - Turn and reposition patient  - Assist with mobility/ambulation  - Relieve pressure over roselyn prominences   - Avoid friction and shearing  - Provide appropriate hygiene  as needed including keeping skin clean and dry  - Evaluate need for skin moisturizer/barrier cream  - Collaborate with interdisciplinary team  - Patient/family teaching  - Consider wound care consult    Assess:  - Review Marcelo scale daily  - Clean and moisturize skin every   - Inspect skin when repositioning, toileting, and assisting with ADLS  - Assess under medical devices such as  every   - Assess extremities for adequate circulation and sensation     Bed Management:  - Have minimal linens on bed & keep smooth, unwrinkled  - Change linens as needed when moist or perspiring  - Avoid sitting or lying in one position for more than  hours while in bed?Keep HOB at degrees   - Toileting:  - Offer bedside commode  - Assess for incontinence every   - Use incontinent care products after each incontinent episode such as     Activity:  - Mobilize patient  times a day  - Encourage activity and walks on unit  - Encourage or provide ROM exercises   - Turn and reposition patient every  Hours  - Use appropriate equipment to lift or move patient in bed  - Instruct/ Assist with weight shifting every  when out of bed in chair  - Consider limitation of chair time  hour intervals    Skin Care:  - Avoid use of baby powder, tape, friction and shearing, hot water or constrictive clothing  - Relieve pressure over bony prominences using   - Do not massage red bony areas    Next Steps:  - Teach patient strategies to minimize risks such as   - Consider consults to  interdisciplinary teams such as  Outcome: Progressing     Problem: Decreased Cardiac Output  Goal: Cardiac output adequate for individual needs  Description: INTERVENTIONS: Monitor for signs and symptoms of decreased cardiac output   - Monitor for dyspnea with exertion and at rest  - Monitor for orthopnea  - Monitor for signs of tachycardia. Place patient on telemetry monitoring.  - Assess patient for jugular vein distention  - Assess patient for lower extremity edema and poor  peripheral perfusion   - Auscultate lung sound for Fine bibasilar crackles   - Monitor for cardiac arrythmias   - Administer beta blockers, antiarrhythmic, and blood pressure medications as ordered    Outcome: Progressing     Problem: Impaired Gas Exchange  Goal: Optimize oxygenation and ensure adequate ventilation  Description: INTERVENTIONS: Monitor for signs and symptoms of respiratory distress                - Elevate HOB or use high fowlers to promote lung expansion                - Administer oxygen as ordered to maintain adequate oxygenation                - Encourage use of IS to promote lung expansion and prevent PN                - Monitor ABGs to assess oxygenation status                - Monitor blood oxygen level to maintain adequate oxygenation                - Encourage cough and deep breathing exercises to promote lung expansion                - Monitor patient's mental status for increased confusion    Outcome: Progressing     Problem: Excess Fluid Volume  Goal: Patient is able to achieve and maintain homeostasis  Description: INTERVENTIONS: Monitor for sign and symptoms of fluid overload  - Evaluate LE edema every shift  - Elevate LE to prevent dependent edema  - Apply EZEKIEL stockings as ordered   - Monitor ankle circumference daily  - Assess for jugular vein distention  - Evaluate provider orders for the CHF diuretic algorithm. Administer diuretics as ordered  - Weigh the patient daily at 0600 and report a weight gain of five pounds or more   - Strict intake and output  - Monitor fluid intake and adhere to fluid restrictions  - Assess lung sounds every shift and as needed  - Monitor vital signs and lab values (CBC, chem, BUN, BNP)  - Measure and document urine output    Outcome: Progressing     Problem: Activity Intolerance  Goal: Patient is able to perform activities within their limitations  Description: INTERVENTIONS:                       -   Alternate periods of activity with periods of rest                  -   Patients is able to maintain normal vitals heart rhythm during activity                 -   Gradually increase activity and exercise as patient can tolerate                 -   Monitor blood pressure and heart before and after exercise                  -   Monitor blood oxygen saturation during activity and apply oxygen as needed    Outcome: Progressing     Problem: Knowledge Deficit  Goal: Patient is able to verbalize understanding of Heart Failure after education  Description: INTERVENTIONS:  - Educate the patient and family on signs and symptoms of HF  - Provide the patient with HF education and HF zone tool  - Educate on the importance of daily weight in the AM and reporting a weight gain               of 3 or more pounds to their primary care physician  - Monitor for SOB  - Maintain and sodium and fluid restriction  - Educate the patient on the importance of medications such as: diuretics, betablockers,               antiarrhythmics and their purpose, dose, route, side effects and labs               if they are needed    Outcome: Progressing     Problem: Potential for Falls  Goal: Patient will remain free of falls  Description: INTERVENTIONS:  - Educate patient/family on patient safety including physical limitations  - Instruct patient to call for assistance with activity   - Consider consulting OT/PT to assist with strengthening/mobility based on AM PAC & JH-HLM score  - Consult OT/PT to assist with strengthening/mobility   - Keep Call bell within reach  - Keep bed low and locked with side rails adjusted as appropriate  - Keep care items and personal belongings within reach  - Initiate and maintain comfort rounds  - Make Fall Risk Sign visible to staff  - Offer Toileting every  Hours, in advance of need  - Initiate/Maintain alarm  - Obtain necessary fall risk management equipment:   - Apply yellow socks and bracelet for high fall risk patients  - Consider moving patient to room near nurses  station  Outcome: Progressing

## 2025-05-20 NOTE — PHYSICAL THERAPY NOTE
"   PT Treatment Note    NAME:  Riri Cuellar  DATE: 05/20/25    AGE:   85 y.o.  Mrn:   [id number]  ADMIT DX:  Shortness of breath [R06.02]  Cough [R05.9]  Anemia [D64.9]  CHF exacerbation (HCC) [I50.9]  Acute on chronic diastolic congestive heart failure (HCC) [I50.33]  Stage 4 chronic kidney disease (HCC) [N18.4]  Performed at least 2 patient identifiers during session: Name and Epic photo       05/20/25 0905   PT Last Visit   PT Visit Date 05/20/25   Note Type   Note Type Treatment   Pain Assessment   Pain Assessment Tool 0-10   Pain Score 7   Pain Location/Orientation Location: Abdomen   Effect of Pain on Daily Activities limits mobility   Patient's Stated Pain Goal No pain   Hospital Pain Intervention(s) Medication (See MAR)   Multiple Pain Sites No   Restrictions/Precautions   Weight Bearing Precautions Per Order No   Other Precautions Chair Alarm;Bed Alarm;O2;Fall Risk  (2L O2)   General   Chart Reviewed Yes   Family/Caregiver Present No   Cognition   Overall Cognitive Status WFL   Arousal/Participation Alert;Responsive;Cooperative   Attention Attends with cues to redirect   Orientation Level Oriented X4   Memory Within functional limits   Following Commands Follows all commands and directions without difficulty   Subjective   Subjective \"I did not sleep to well last night'   Bed Mobility   Supine to Sit 4  Minimal assistance   Additional items Assist x 1;Increased time required;Verbal cues   Sit to Supine   (dnt patient in recliner to end session)   Additional Comments vc for hand placement,   Transfers   Sit to Stand 4  Minimal assistance   Additional items Assist x 1;Increased time required;Verbal cues   Stand to Sit 4  Minimal assistance   Additional items Assist x 1;Increased time required;Armrests   Stand pivot 4  Minimal assistance   Additional items Assist x 1;Increased time required;Verbal cues   Additional Comments rw used, verbal cues for sequencing   Balance   Static Sitting Fair +   Dynamic " Sitting Fair   Static Standing Fair -   Dynamic Standing Fair -   Ambulatory Fair -   Activity Tolerance   Activity Tolerance Patient limited by fatigue;Patient limited by pain   Exercises   Glute Sets Sitting;20 reps;Bilateral   Hip Adduction Sitting;20 reps;Bilateral   Knee AROM Long Arc Quad Sitting;20 reps;Bilateral   Ankle Pumps Sitting;20 reps;Bilateral   Marching Sitting;20 reps;Bilateral   Assessment   Prognosis Fair   Problem List Decreased strength;Decreased mobility;Impaired balance;Decreased endurance   Assessment Pt seen for PT treatment session this date with interventions consisting of therapeutic activity to educate patient on safe transfers to progress as able and therapeutic exercise to improve strength to improve functional mobility. Pt agreeable to PT treatment session upon arrival, pt found supine in bed, in no apparent distress, A&O x 4, and responsive. Since previous session, pt has made fair progress as evidenced by increased activity tolerance  Barriers during this session include pain and fatigue.  Pt continues to be functioning below baseline level, and remains limited 2* factors listed above and including decreased strength, impaired activity tolerance, impaired  balance, pain, and decreased endurance.  Pt prognosis for achieving goals is fair, pending pt progress with hospitalization/medical status improvements, and indicated by motivated to participate in therapy and ability to follow cues. PT will continue to see pt during current hospitalization in order to address the deficits listed above and provide interventions consistent w/ POC in effort to achieve goals. Current goals and POC remain appropriate, pt continues to have rehab potential  Upon conclusion pt seated OOB in recliner. The patient's AM-PAC Basic Mobility Inpatient Short Form Raw Score is 15.  A Raw score of less than or equal to 16 suggests the patient may benefit from discharge to post-acute rehabilitation services.  Based on patient presentations and impairments, pt would most appropriately benefit from Level 2 resource intensity upon discharge.  Please also refer to the recommendation of the Physical Therapist for safe discharge planning. RN verbalized pt appropriate for PT session.   Goals   Patient Goals to feel better   LTG Expiration Date 05/29/25   PT Treatment Day 1   Plan   Treatment/Interventions Functional transfer training;LE strengthening/ROM;Elevations;Therapeutic exercise;Endurance training;Bed mobility;Gait training   Progress Progressing toward goals   PT Frequency 3-5x/wk   Discharge Recommendation   Rehab Resource Intensity Level, PT II (Moderate Resource Intensity)   Equipment Recommended Walker   AM-PAC Basic Mobility Inpatient   Turning in Flat Bed Without Bedrails 3   Lying on Back to Sitting on Edge of Flat Bed Without Bedrails 3   Moving Bed to Chair 3   Standing Up From Chair Using Arms 3   Walk in Room 3   Climb 3-5 Stairs With Railing 1   Basic Mobility Inpatient Raw Score 16   Basic Mobility Standardized Score 38.32   [facility] Highest Level Of Mobility   JH-HLM Goal 5: Stand one or more mins   JH-HLM Achieved 5: Stand (1 or more minutes)       Time In: 0905   Time Out: 0928  Total Treatment Minutes: 23    Yovani Gonzales, PT

## 2025-05-20 NOTE — CONSULTS
Consultation - Gastroenterology   Name: Riri Cuellar 85 y.o. female I MRN: [id number]  Unit/Bed#: -01 I Date of Admission: 5/17/2025   Date of Service: 5/20/2025 I Hospital Day: 3       Inpatient consult to gastroenterology     Date/Time  5/20/2025 12:56 PM     Performed by  Jessica Sagastume DO   Authorized by  Elaine Lucas MD           Physician Requesting Evaluation: Elaine Lucas MD   Reason for Evaluation / Principal Problem: Dysphagia    Assessment & Plan  Dysphagia  85-year-old female with a history of A-fib on Xarelto, HFpEF, GERD, CKD, iron deficiency anemia, and RA who presents 5/17 with shortness of breath and lower extremity edema found to have acute on chronic heart failure and anemia. She has remained afebrile and hemodynamically stable. Labs notable for hemoglobin 6.6 on admission (compared to 9.4 in 2017).  Of note, she has macrocytic anemia with .  She was transfused 2U PRBC with improvement in hemoglobin now up to 10.0.  Iron panel shows labs consistent with iron deficiency with ferritin 20.  Liver enzymes normal.  Creatinine on admission 1.3 and has since she has since developed an NATANAEL with peak creatinine 2.0 on 5/19.     Gastroenterology has now been consulted for dysphagia.  She dysphagia to both solids and liquids over the past 2 weeks with significant coughing during meals.  She underwent speech evaluation 5/19 with VBS that showed mild oropharyngeal dysphagia with slow esophageal motility to solids/liquids with retropulsion concerning that patient is aspirating.  She is currently on dysphagia 2 diet, and still experiencing dysphagia with this.  She has never had an EGD.  Differential includes esophageal motility disorder, esophageal stricture/ring, malignancy.    N.p.o. at midnight; tentative plan for EGD tomorrow  She is on Xarelto, so EGD tomorrow will be predominantly for diagnostic purposes  Appreciate ongoing speech recommendations  Aspiration  precautions  Can continue PPI for now    Gastroesophageal reflux disease without esophagitis  Continue PPI  Acute on chronic diastolic congestive heart failure (HCC)  Wt Readings from Last 3 Encounters:   05/20/25 97.9 kg (215 lb 13.3 oz)     Stage 4 chronic kidney disease (HCC)  Lab Results   Component Value Date    EGFR 25 05/20/2025    EGFR 21 05/19/2025    EGFR 27 05/18/2025    CREATININE 1.81 (H) 05/20/2025    CREATININE 2.04 (H) 05/19/2025    CREATININE 1.68 (H) 05/18/2025     I have discussed the above management plan in detail with the primary service.     History of Present Illness   HPI:  Riri Cuellar is a 85-year-old female with a history of A-fib on Xarelto, HFpEF, GERD, CKD, iron deficiency anemia, and RA who presents 5/17 with shortness of breath and lower extremity edema found to have acute on chronic heart failure and anemia. She has remained afebrile and hemodynamically stable. Labs notable for hemoglobin 6.6 on admission (compared to 9.4 in 2017).  Of note, she has macrocytic anemia with .  She was transfused 2U PRBC with improvement in hemoglobin now up to 10.0.  Iron panel shows labs consistent with iron deficiency with ferritin 20.  Liver enzymes normal.  Creatinine on admission 1.3 and has since she has since developed an NATANAEL with peak creatinine 2.0 on 5/19.     Gastroenterology has now been consulted for dysphagia.  Patient seen evaluated on medical floor.  She dysphagia to both solids and liquids over the past 2 weeks with significant coughing during meals.  She denies odynophagia, vomiting, abdominal pain.  She denies chronic reflux.  She denies blood in stool.  She has never had an EGD.  She reports colonoscopy many years ago which was normal.  Denies family history of esophageal cancer or Khan's esophagus.    Review of Systems negative other than stated above    Objective :  Temp:  [97.4 °F (36.3 °C)-98.4 °F (36.9 °C)] 97.4 °F (36.3 °C)  HR:  [61-86] 76  BP: ()/(53-62)  109/53  Resp:  [16-20] 20  SpO2:  [98 %-100 %] 98 %  O2 Device: Nasal cannula  Nasal Cannula O2 Flow Rate (L/min):  [2 L/min] 2 L/min    Physical Exam  General: No apparent distress, resting comfortably   Head: Normocephalic, atraumatic  Eyes: Anicteric, no conjunctival erythema  ENT: External ear normal, no nasal discharge  Neck: Trachea midline, no visible lymphadenopathy   Respiratory: Supplemental oxygen. On-labored respirations, symmetric thorax expansion  Cardiovascular:  Extremities appear well-perfused  Abdomen: Non-distended   Extremities: Moves extremities spontaneously  Skin: No visible rashes or jaundice  Neuro: No gross focal deficits, no aphasia     Jessica Sagastume D.O.  Gastroenterology Fellow, PGY-4  [facility]

## 2025-05-20 NOTE — PROGRESS NOTES
Progress Note - Hospitalist   Name: Riri Cuellar 85 y.o. female I MRN: [id number]  Unit/Bed#: -01 I Date of Admission: 5/17/2025   Date of Service: 5/20/2025 I Hospital Day: 3    Assessment & Plan  Acute on chronic diastolic congestive heart failure (HCC)  Patient presents with shortness of breath and significant lower extremity edema  Arrival   Appreciate cardiology input  Patient has diuresed almost 900 cc of fluid thus far  2D echocardiogram results reviewed-EF of 55%, see the full report for additional details  Continue goal-directed medical therapy with the following medications:-  #1.  Xarelto 15 mg p.o. daily  #2.  Toprol-XL 25 mg p.o. daily  #3.  Atorvastatin 40 mg p.o. daily  #4.  Furosemide 50 mg IV twice daily  Elevated troponins-  Troponin trend as follows-53, 43  Patient declined any further invasive testing, treatment, and/or workup such as a cardiac catheterization  Most likely none myocardial injury related elevated troponins in the setting of having CHF, and chronic kidney disease  Continue to monitor daily weight, input and output  Repeat blood work in the a.m.  Anemia  Hemoglobin was 6.6 at time of arrival which might have been a contributing factor to her initial complaint of shortness of breath   Patient has been transfused 2 units of packed red blood cells on this admission   Follow-up hemoglobins have remained stable  Suspect that this is multifactorial in the setting of having anemia of chronic kidney disease, rheumatoid arthritis and iron deficiency anemia  Patient denies any visible blood loss, denies hematemesis, hemoptysis, and/or blood loss per rectum hemoglobin 6.6 on presentation which improved to 8.0 status post 2 units of packed red blood cells  Hold off on IV Venofer in the setting of significant volume overload  Continue to monitor H&H  Stage 4 chronic kidney disease (HCC)  Lab Results   Component Value Date    EGFR 25 05/20/2025    EGFR 21 05/19/2025    EGFR 27  05/18/2025    CREATININE 1.81 (H) 05/20/2025    CREATININE 2.04 (H) 05/19/2025    CREATININE 1.68 (H) 05/18/2025   Unclear baseline as patient has not had BMP since 2017  Creatinine 1.95 on presentation  Possibly secondary to cardiorenal syndrome in the setting of significant volume overload  IV diuresis as above  Creatinine down to 1.81  Defer additional management to nephrology  Dysphagia  Unspecified exact etiology  Patient was seen by speech therapy yesterday and had a video swallow evaluation-recommendations included thin liquids and a dysphagia level 2 diet mechanical soft  Nursing reports that despite this the patient continues to extensively cough with every meal  Will consult GI to see if the patient would benefit from an EGD  Paroxysmal atrial fibrillation (HCC)  Continue Toprol-XL for rate control  Continue Xarelto for anticoagulation purposes  Gastroesophageal reflux disease without esophagitis  Continue Protonix  Rheumatoid arthritis (HCC)  History of RA on methotrexate  Outpatient follow-up with Rheumatology    VTE Pharmacologic Prophylaxis: VTE Score: 6 High Risk (Score >/= 5) - Pharmacological DVT Prophylaxis Ordered: rivaroxaban (Xarelto). Sequential Compression Devices Ordered.    Mobility:   Basic Mobility Inpatient Raw Score: 16  JH-HLM Goal: 5: Stand one or more mins  JH-HLM Achieved: 5: Stand (1 or more minutes)  JH-HLM Goal achieved. Continue to encourage appropriate mobility.    Patient Centered Rounds: I performed bedside rounds with nursing staff today.   Discussions with Specialists or Other Care Team Provider: GI, cardiology    Education and Discussions with Family / Patient: Attempted to update  (daughter) via phone. Unable to contact.    Current Length of Stay: 3 day(s)  Current Patient Status: Inpatient   Certification Statement: The patient will continue to require additional inpatient hospital stay due to need for GI evaluation, and continued IV diuretics  Discharge  Plan: Anticipate discharge in 24-48 hrs to rehab facility.    Code Status: Level 3 - DNAR and DNI    Subjective   Patient seen, sitting up in the chair, reports feeling okay at this time, no new complaints    Objective :  Temp:  [97.4 °F (36.3 °C)-98.4 °F (36.9 °C)] 97.4 °F (36.3 °C)  HR:  [61-86] 76  BP: ()/(53-62) 109/53  Resp:  [16-20] 20  SpO2:  [98 %-100 %] 98 %  O2 Device: Nasal cannula  Nasal Cannula O2 Flow Rate (L/min):  [2 L/min] 2 L/min    Body mass index is 42.15 kg/m².     Input and Output Summary (last 24 hours):     Intake/Output Summary (Last 24 hours) at 5/20/2025 1214  Last data filed at 5/20/2025 0800  Gross per 24 hour   Intake 1300 ml   Output 1525 ml   Net -225 ml       Physical Exam  Constitutional:       General: She is not in acute distress.     Appearance: Normal appearance. She is normal weight. She is not ill-appearing.   HENT:      Head: Normocephalic and atraumatic.      Nose: Nose normal.      Mouth/Throat:      Mouth: Mucous membranes are moist.     Eyes:      Extraocular Movements: Extraocular movements intact.      Pupils: Pupils are equal, round, and reactive to light.       Cardiovascular:      Rate and Rhythm: Normal rate and regular rhythm.      Pulses: Normal pulses.      Heart sounds: Normal heart sounds. No murmur heard.     No friction rub. No gallop.   Pulmonary:      Effort: Pulmonary effort is normal. No respiratory distress.      Breath sounds: Normal breath sounds. No wheezing, rhonchi or rales.   Abdominal:      General: There is no distension.      Palpations: Abdomen is soft. There is no mass.      Tenderness: There is no abdominal tenderness.      Hernia: No hernia is present.     Musculoskeletal:         General: No swelling or tenderness. Normal range of motion.      Cervical back: Normal range of motion and neck supple. No rigidity.      Right lower leg: No edema.      Left lower leg: No edema.     Skin:     General: Skin is warm.      Capillary Refill:  Capillary refill takes less than 2 seconds.      Findings: No erythema or rash.     Neurological:      General: No focal deficit present.      Mental Status: She is alert and oriented to person, place, and time. Mental status is at baseline.      Cranial Nerves: No cranial nerve deficit.      Motor: No weakness.     Psychiatric:         Mood and Affect: Mood normal.         Behavior: Behavior normal.       Lines/Drains:  Lines/Drains/Airways       Active Status       Name Placement date Placement time Site Days    External Urinary Catheter 05/17/25  1653  -- 2                            Lab Results: I have reviewed the following results:   Results from last 7 days   Lab Units 05/20/25  0625   WBC Thousand/uL 5.65   HEMOGLOBIN g/dL 10.0*   HEMATOCRIT % 33.7*   PLATELETS Thousands/uL 199   SEGS PCT % 69   LYMPHO PCT % 19   MONO PCT % 8   EOS PCT % 3     Results from last 7 days   Lab Units 05/20/25  0625 05/18/25  0512 05/17/25  1551   SODIUM mmol/L 140   < > 140   POTASSIUM mmol/L 3.9   < > 4.0   CHLORIDE mmol/L 105   < > 109*   CO2 mmol/L 29   < > 21   BUN mg/dL 44*   < > 47*   CREATININE mg/dL 1.81*   < > 1.95*   ANION GAP mmol/L 6   < > 10   CALCIUM mg/dL 9.0   < > 8.6   ALBUMIN g/dL  --   --  4.0   TOTAL BILIRUBIN mg/dL  --   --  0.63   ALK PHOS U/L  --   --  63   ALT U/L  --   --  20   AST U/L  --   --  29   GLUCOSE RANDOM mg/dL 84   < > 178*    < > = values in this interval not displayed.     Results from last 7 days   Lab Units 05/17/25  1551   INR  1.65*     Results from last 7 days   Lab Units 05/19/25  1247   POC GLUCOSE mg/dl 112         Results from last 7 days   Lab Units 05/17/25  1933 05/17/25  1551   LACTIC ACID mmol/L 2.3* 2.1*   PROCALCITONIN ng/ml  --  0.06       Recent Cultures (last 7 days):   Results from last 7 days   Lab Units 05/17/25  1713 05/17/25  1632 05/17/25  1551   BLOOD CULTURE   --  No Growth at 48 hrs. No Growth at 48 hrs.   URINE CULTURE  >100,000 cfu/ml Klebsiella pneumoniae ESBL*   --   --        Imaging Results Review: No pertinent imaging studies reviewed.  Other Study Results Review: No additional pertinent studies reviewed.    Last 24 Hours Medication List:     Current Facility-Administered Medications:     acetaminophen (TYLENOL) tablet 650 mg, Q6H PRN    allopurinol (ZYLOPRIM) tablet 100 mg, Daily    atorvastatin (LIPITOR) tablet 40 mg, Daily With Dinner    furosemide (LASIX) injection 50 mg, BID    HYDROcodone Bit-Homatrop MBr (HYCODAN) oral syrup 5 mL, Q4H PRN    metoprolol succinate (TOPROL-XL) 24 hr tablet 25 mg, Daily    nystatin (MYCOSTATIN) powder, BID    ondansetron (ZOFRAN) injection 4 mg, Q6H PRN    pantoprazole (PROTONIX) EC tablet 40 mg, Early Morning    rivaroxaban (XARELTO) tablet 15 mg, Daily With Dinner    Administrative Statements   Today, Patient Was Seen By: Elaine Lucas MD      **Please Note: This note may have been constructed using a voice recognition system.**

## 2025-05-20 NOTE — ASSESSMENT & PLAN NOTE
Lab Results   Component Value Date    EGFR 25 05/20/2025    EGFR 21 05/19/2025    EGFR 27 05/18/2025    CREATININE 1.81 (H) 05/20/2025    CREATININE 2.04 (H) 05/19/2025    CREATININE 1.68 (H) 05/18/2025

## 2025-05-20 NOTE — ASSESSMENT & PLAN NOTE
Unspecified exact etiology  Patient was seen by speech therapy yesterday and had a video swallow evaluation-recommendations included thin liquids and a dysphagia level 2 diet mechanical soft  Nursing reports that despite this the patient continues to extensively cough with every meal  Will consult GI to see if the patient would benefit from an EGD

## 2025-05-20 NOTE — ASSESSMENT & PLAN NOTE
Lab Results   Component Value Date    EGFR 25 05/20/2025    EGFR 21 05/19/2025    EGFR 27 05/18/2025    CREATININE 1.81 (H) 05/20/2025    CREATININE 2.04 (H) 05/19/2025    CREATININE 1.68 (H) 05/18/2025     Baseline creatinine unknown, improving with diuresis   Appreciate nephrology recommendations

## 2025-05-20 NOTE — ASSESSMENT & PLAN NOTE
85-year-old female with a history of A-fib on Xarelto, HFpEF, GERD, CKD, iron deficiency anemia, and RA who presents 5/17 with shortness of breath and lower extremity edema found to have acute on chronic heart failure and anemia. She has remained afebrile and hemodynamically stable. Labs notable for hemoglobin 6.6 on admission (compared to 9.4 in 2017).  Of note, she has macrocytic anemia with .  She was transfused 2U PRBC with improvement in hemoglobin now up to 10.0.  Iron panel shows labs consistent with iron deficiency with ferritin 20.  Liver enzymes normal.  Creatinine on admission 1.3 and has since she has since developed an NATANAEL with peak creatinine 2.0 on 5/19.     Gastroenterology has now been consulted for dysphagia.  She dysphagia to both solids and liquids over the past 2 weeks with significant coughing during meals.  She underwent speech evaluation 5/19 with VBS that showed mild oropharyngeal dysphagia with slow esophageal motility to solids/liquids with retropulsion concerning that patient is aspirating.  She is currently on dysphagia 2 diet, and still experiencing dysphagia with this.  She has never had an EGD.  Differential includes esophageal motility disorder, esophageal stricture/ring, malignancy.    N.p.o. at midnight; tentative plan for EGD tomorrow  She is on Xarelto, so EGD tomorrow will be predominantly for diagnostic purposes  Appreciate ongoing speech recommendations  Aspiration precautions  Can continue PPI for now

## 2025-05-20 NOTE — ASSESSMENT & PLAN NOTE
Wt Readings from Last 3 Encounters:   05/20/25 97.9 kg (215 lb 13.3 oz)     EF preserved, BNP minimally elevated, though patient still examines hypervolemic  Patient declined invasive or aggressive treatments or strategies including ischemic evaluation but is agreeable to medical therapy  Continue IV furosemide 50 mg twice daily  Continue metoprolol succinate 25 mg daily  Low-sodium diet, fluid restriction, daily weights on a standing scale if able, I/O

## 2025-05-20 NOTE — PROGRESS NOTES
Progress Note - Cardiology   Name: Riri Cuellar 85 y.o. female I MRN: [id number]  Unit/Bed#: -01 I Date of Admission: 5/17/2025   Date of Service: 5/20/2025 I Hospital Day: 3    Assessment & Plan  Acute on chronic diastolic congestive heart failure (HCC)  Wt Readings from Last 3 Encounters:   05/20/25 97.9 kg (215 lb 13.3 oz)     EF preserved, BNP minimally elevated, though patient still examines hypervolemic  Patient declined invasive or aggressive treatments or strategies including ischemic evaluation but is agreeable to medical therapy  Continue IV furosemide 50 mg twice daily  Continue metoprolol succinate 25 mg daily  Low-sodium diet, fluid restriction, daily weights on a standing scale if able, I/O  Paroxysmal atrial fibrillation (HCC)  Rate controlled  Continue metoprolol 25 mg daily  Continue to monitor on Xarelto.  Hemoglobin stable, patient denies bleeding  Anemia  Presenting hemoglobin 6.6  S/p transfusion  Hgb stable, 10.0 today  Stage 4 chronic kidney disease (HCC)  Lab Results   Component Value Date    EGFR 25 05/20/2025    EGFR 21 05/19/2025    EGFR 27 05/18/2025    CREATININE 1.81 (H) 05/20/2025    CREATININE 2.04 (H) 05/19/2025    CREATININE 1.68 (H) 05/18/2025     Baseline creatinine unknown, improving with diuresis   Appreciate nephrology recommendations    Outpatient Cardiologist: None      Subjective:   Patient seen and examined.  No significant events overnight.    Reports improvement in breathing    Summary comments:  Patient presented with shortness of breath and lower extremity edema.  Mild vascular congestion on imaging, mildly elevated BNP as well as anemia, (Hgb 6.6) and elevated creatinine (1.95).  Of note, patient had not had any imaging or laboratory testing since 2017.    IV lasix (50 mg bid) initiated though u/o under 1 L and renal function noted to be worsening.  Received IVF per nephro in addition to continued IV lasix--suspect she may have been intravascularly volume  depleted.      Today, she reports feeling better with regard to her breathing.  Edema is improved.  Creatinine is now down trending.  Question accuracy of weight performed on bed scale but is downtrending.  Now net -1.7 L.    Continue IV Lasix today.  Plan to transition to oral regimen tomorrow.  Monitor renal function and electrolytes, replace as needed.  Monitor daily weights (standing scale if able) I/O, low-sodium diet.  Nephrology has recommended against a fluid restriction.    Telemetry/ECG/Cardiac testing:   Echo 5/19/2025  EF 55%  Mild DEVONTE  Mild to moderate AI, moderate AS, peak velocity 3.01 m/s  Moderate MR, mild to moderate TR    Echo 4/14/2017 ([facility])  EF 70%  Mild LAE  Moderate AI  Mild TR    Vitals: Blood pressure 98/53, pulse 61, temperature (!) 97.4 °F (36.3 °C), temperature source Oral, resp. rate 20, height 5' (1.524 m), weight 97.9 kg (215 lb 13.3 oz), SpO2 99%.,   Orthostatic Blood Pressures      Flowsheet Row Most Recent Value   Blood Pressure 98/53 filed at 05/20/2025 0724   Patient Position - Orthostatic VS Lying filed at 05/20/2025 0724        ,   Weight (last 2 days)       Date/Time Weight    05/20/25 0600 97.9 (215.83)    05/19/25 0646 103 (227)    05/19/25 0532 103 (227.29)    05/19/25 0500 103 (227.29)    05/18/25 0600 105 (231.04)            Physical Exam:    General:  Normal appearance in no distress.  Eyes:  Anicteric.  Oral mucosa:  Moist.  Neck:  difficult to assess due to body habitus, though no apparent JVD  Chest:  Clear to auscultation   Cardiac:  3/6 systolic murmur at the base.  No palpable PMI.  Normal S1 and S2.    Abdomen:  obese, soft and nontender. No palpable organomegaly or aortic enlargement.  Extremities:  +1 bilat LE edema  Neuro:  Grossly symmetric.  Psych:  Alert and oriented x3.      Medications:    Current Medications[1]     Labs & Results:  Labs reviewed and prominent abnormalities reviewed above and/or below.  Troponins:    Results from last 7 days   Lab Units  05/17/25  2211 05/17/25  1551   HS TNI 0HR ng/L  --  53*   HS TNI 2HR ng/L 43  --    HSTNI D2 ng/L -10  --         BNP:   Results from last 6 Months   Lab Units 05/17/25  1551   BNP pg/mL 233*                    [1]   Current Facility-Administered Medications:     acetaminophen (TYLENOL) tablet 650 mg, 650 mg, Oral, Q6H PRN, Elaine Lucas MD, 650 mg at 05/19/25 2149    allopurinol (ZYLOPRIM) tablet 100 mg, 100 mg, Oral, Daily, Juan A Shirley PA-C, 100 mg at 05/19/25 1123    atorvastatin (LIPITOR) tablet 40 mg, 40 mg, Oral, Daily With Dinner, Juan A Shinertmonalisa, DO, 40 mg at 05/19/25 1741    furosemide (LASIX) injection 50 mg, 50 mg, Intravenous, BID, Juan A Shinerty, DO, 50 mg at 05/19/25 1122    HYDROcodone Bit-Homatrop MBr (HYCODAN) oral syrup 5 mL, 5 mL, Oral, Q4H PRN, Surendra Ramirez PA-C, 5 mL at 05/20/25 0028    metoprolol succinate (TOPROL-XL) 24 hr tablet 25 mg, 25 mg, Oral, Daily, Juan A Shinerty, DO, 25 mg at 05/19/25 0933    nystatin (MYCOSTATIN) powder, , Topical, BID, Juan A Shinerty, DO, Given at 05/19/25 1741    ondansetron (ZOFRAN) injection 4 mg, 4 mg, Intravenous, Q6H PRN, Juan A Shinerty, DO    pantoprazole (PROTONIX) EC tablet 40 mg, 40 mg, Oral, Early Morning, Juan A Shinerty, DO, 40 mg at 05/20/25 0632    rivaroxaban (XARELTO) tablet 15 mg, 15 mg, Oral, Daily With Dinner, Juan A Shinerty, DO, 15 mg at 05/19/25 1741

## 2025-05-20 NOTE — PROGRESS NOTES
Progress Note - Nephrology   Name: Riri Cuellar 85 y.o. female I MRN: [id number]  Unit/Bed#: -01 I Date of Admission: 5/17/2025   Date of Service: 5/20/2025 I Hospital Day: 3    Assessment & Plan  Stage 4 chronic kidney disease (HCC)  Lab Results   Component Value Date    EGFR 25 05/20/2025    EGFR 21 05/19/2025    EGFR 27 05/18/2025    CREATININE 1.81 (H) 05/20/2025    CREATININE 2.04 (H) 05/19/2025    CREATININE 1.68 (H) 05/18/2025   Baseline creatinine unclear.  Last known labs from 2017 with creatinine 1.2-1.5.  No previous nephrology records available in Care Everywhere.    Etiology presumed cardiorenal pathophysiology, obesity related FSGS and age-related nephron loss.  Creatinine 1.68 mg/dL, GFR 27 mL/min.  Creatinine previously 1.95 on 5/17 and 1.31 on 5/1.  UA trace intact blood, 1+ leukocytes, 30-50 WBC, innumerable bacteria.    Will check urine creatinine, urine sodium and renal ultrasound.  Chest x-ray-wet read noting bilateral pulmonary vascular congestion, 5/17.  Volume status examines hypervolemic  Continue to avoid nephrotoxins and NSAIDs.  Avoid hypotension.  Receiving Lasix 50 mg twice daily IV.    5/19  Creatinine slightly elevated from 1.7-2.0, suspected to be on the basis of dehydration from being n.p.o. and receiving diuretics.  Status post 1 L of D5W bolus.  Continue with current management and supportive care measures    5/20  The creatinine is slightly better today after a liter of fluids today, sodium level is also improved. She was encouraged to stay well hydrated as she is undergoing an aggressive diuresis for appropriate reasons as noted in the principal problem.     Acute on chronic diastolic congestive heart failure (HCC)  Wt Readings from Last 3 Encounters:   05/20/25 97.9 kg (215 lb 13.3 oz)   EF 70%, 4/14/17. Repeat ECHO pending.  Continue management per cardiology/hospitalist    5/20 - cardiology note reviewed, recommendation to continue with IV lasix 50 mg twice  daily  Paroxysmal atrial fibrillation (HCC)  On Xarelto.  Gastroesophageal reflux disease without esophagitis    Rheumatoid arthritis (HCC)    Anemia  Hemoglobin 8.0 g/dL s/p PRBC x 2 for hemoglobin 6.6 on admit 5/17. Now improved to 10 g/dL  Dysphagia  GI now consulted to see if patient will benefit from EGD        Subjective   Patient seen and examined. She is resting/sleeping in the chair. No complaints offered     Objective :  Temp:  [97.4 °F (36.3 °C)-97.8 °F (36.6 °C)] 97.8 °F (36.6 °C)  HR:  [61-83] 65  BP: ()/(53-68) 130/68  Resp:  [17-20] 17  SpO2:  [98 %-99 %] 98 %  O2 Device: Nasal cannula  Nasal Cannula O2 Flow Rate (L/min):  [2 L/min] 2 L/min    Current Weight: Weight - Scale: 97.9 kg (215 lb 13.3 oz)  First Weight: Weight - Scale: 102 kg (225 lb 5 oz)  I/O         05/18 0701  05/19 0700 05/19 0701  05/20 0700 05/20 0701  05/21 0700    P.O.  0 300    Blood       IV Piggyback 100 1000     Total Intake(mL/kg) 100 (1) 1000 (10.2) 300 (3.1)    Urine (mL/kg/hr) 1080 (0.4) 1875 (0.8)     Total Output 1080 1875     Net -980 -875 +300                 Physical Exam  Vitals and nursing note reviewed.   Constitutional:       General: She is not in acute distress.     Appearance: She is well-developed.   HENT:      Head: Normocephalic and atraumatic.     Eyes:      Conjunctiva/sclera: Conjunctivae normal.       Cardiovascular:      Rate and Rhythm: Normal rate and regular rhythm.      Heart sounds: No murmur heard.  Pulmonary:      Effort: Pulmonary effort is normal. No respiratory distress.      Breath sounds: Normal breath sounds.   Abdominal:      Palpations: Abdomen is soft.      Tenderness: There is no abdominal tenderness.     Musculoskeletal:         General: No swelling.      Cervical back: Neck supple.     Skin:     General: Skin is warm and dry.      Capillary Refill: Capillary refill takes less than 2 seconds.     Neurological:      Mental Status: She is alert.     Psychiatric:         Mood and  "Affect: Mood normal.         Medications:  Current Medications[1]      Lab Results: I have reviewed the following results:  Results from last 7 days   Lab Units 05/20/25  0625 05/19/25  0526 05/18/25  0512 05/17/25  1551   WBC Thousand/uL 5.65 5.04 6.60 7.82   HEMOGLOBIN g/dL 10.0* 8.4* 8.0* 6.6*   HEMATOCRIT % 33.7* 27.6* 26.3* 23.5*   PLATELETS Thousands/uL 199 178 157 190   POTASSIUM mmol/L 3.9 4.0 4.2 4.0   CHLORIDE mmol/L 105 112* 111* 109*   CO2 mmol/L 29 25 21 21   BUN mg/dL 44* 48* 46* 47*   CREATININE mg/dL 1.81* 2.04* 1.68* 1.95*   CALCIUM mg/dL 9.0 8.3* 8.3* 8.6   MAGNESIUM mg/dL  --  2.0 1.4*  --    PHOSPHORUS mg/dL  --  4.0  --   --    ALBUMIN g/dL  --   --   --  4.0       Administrative Statements     Portions of the record may have been created with voice recognition software. Occasional wrong word or \"sound a like\" substitutions may have occurred due to the inherent limitations of voice recognition software. Read the chart carefully and recognize, using context, where substitutions have occurred.If you have any questions, please contact the dictating provider.         [1]   Current Facility-Administered Medications:     acetaminophen (TYLENOL) tablet 650 mg, 650 mg, Oral, Q6H PRN, Elaine Lucas MD, 650 mg at 05/19/25 2149    allopurinol (ZYLOPRIM) tablet 100 mg, 100 mg, Oral, Daily, Juan A Shirley PA-C, 100 mg at 05/20/25 0957    atorvastatin (LIPITOR) tablet 40 mg, 40 mg, Oral, Daily With Dinner, Juan A Fowler DO, 40 mg at 05/19/25 1741    furosemide (LASIX) injection 50 mg, 50 mg, Intravenous, BID, Juan A Fowler DO, 50 mg at 05/20/25 1000    HYDROcodone Bit-Homatrop MBr (HYCODAN) oral syrup 5 mL, 5 mL, Oral, Q4H PRN, Surendra Ramirez PA-C, 5 mL at 05/20/25 0028    metoprolol succinate (TOPROL-XL) 24 hr tablet 25 mg, 25 mg, Oral, Daily, Juan A Fowler DO, 25 mg at 05/19/25 0933    nystatin (MYCOSTATIN) powder, , Topical, BID, Juan A Fowler DO, Given at 05/20/25 0958    ondansetron " (ZOFRAN) injection 4 mg, 4 mg, Intravenous, Q6H PRN, Juan A Fowler,     pantoprazole (PROTONIX) EC tablet 40 mg, 40 mg, Oral, Early Morning, Juan A Fowler DO, 40 mg at 05/20/25 0632    rivaroxaban (XARELTO) tablet 15 mg, 15 mg, Oral, Daily With Dinner, Juan A Fowler DO, 15 mg at 05/19/25 8959

## 2025-05-20 NOTE — CASE MANAGEMENT
Case Management Discharge Planning Note    Patient name Riri Cuellar  Location /-01 MRN [id number]  : 1940 Date 2025       Current Admission Date: 2025  Current Admission Diagnosis:Acute on chronic diastolic congestive heart failure (HCC)   Patient Active Problem List    Diagnosis Date Noted    Dysphagia 2025    Acute on chronic diastolic congestive heart failure (HCC) 2025    Paroxysmal atrial fibrillation (HCC) 2025    Gastroesophageal reflux disease without esophagitis 2025    Rheumatoid arthritis (HCC) 2025    Anemia 2025    Stage 4 chronic kidney disease (HCC) 2025      LOS (days): 3  Geometric Mean LOS (GMLOS) (days): 3  Days to GMLOS:0.1     OBJECTIVE:  Risk of Unplanned Readmission Score: 22.08         Current admission status: Inpatient   Preferred Pharmacy:   [facility] - [place], [place] - [address] [place] [id number]  Phone: [phone] Fax: [phone]    Primary Care Provider: Femi Mcpherson DO    Primary Insurance: [employer]  Secondary Insurance:     DISCHARGE DETAILS:    Discharge planning discussed with:: patient & daughter at the bedside  Freedom of Choice: Yes  Comments - Freedom of Choice: list of accepting facilities was reviewed - daughter spoke with the staff at [facility] and she would like the pt to return -  recommendation is for rehab - cm sent for auth for snf rehab & BLS transport  CM contacted family/caregiver?: Yes             Contacts  Patient Contacts: Norma Sandhu  Relationship to Patient:: Family (daughter)  Contact Method: In Person  Reason/Outcome: Discharge Planning    Requested Home Health Care         Is the patient interested in HHC at discharge?: No    DME Referral Provided  Referral made for DME?: No    Other Referral/Resources/Interventions Provided:  Interventions: SNF, Short Term Rehab  Referral Comments: pt accepted to [facility] for rehab  sent for auth and then for long term- daughter is working on the MA paperwork -  no covid test is needed- report needs to be called & avs needs to be faxed    Would you like to participate in our [facility] service program?  : No - Declined    Treatment Team Recommendation: Short Term Rehab, SNF ([facility] sent for auth - BLS oxygen sent for auth)                                   IMM Given (Date):: 05/20/25  IMM Given to:: Family (daughter)          Accepting Facility Name, City & State : [facility] [address] [place] [address]  Receiving Facility/Agency Phone Number: [phone]

## 2025-05-20 NOTE — ASSESSMENT & PLAN NOTE
Wt Readings from Last 3 Encounters:   05/20/25 97.9 kg (215 lb 13.3 oz)   EF 70%, 4/14/17. Repeat ECHO pending.  Continue management per cardiology/hospitalist    5/20 - cardiology note reviewed, recommendation to continue with IV lasix 50 mg twice daily

## 2025-05-20 NOTE — CASE MANAGEMENT
Case Management Discharge Planning Note    Patient name Riri Cuellar  Location /-01 MRN [id number]  : 1940 Date 2025       Current Admission Date: 2025  Current Admission Diagnosis:Acute on chronic diastolic congestive heart failure (HCC)   Patient Active Problem List    Diagnosis Date Noted    Dysphagia 2025    Acute on chronic diastolic congestive heart failure (HCC) 2025    Paroxysmal atrial fibrillation (HCC) 2025    Gastroesophageal reflux disease without esophagitis 2025    Rheumatoid arthritis (HCC) 2025    Anemia 2025    Stage 4 chronic kidney disease (HCC) 2025      LOS (days): 2  Geometric Mean LOS (GMLOS) (days): 3  Days to GMLOS:0.9     OBJECTIVE:  Risk of Unplanned Readmission Score: 24.8         Current admission status: Inpatient   Preferred Pharmacy:   [facility] - [address] [address]  Phone: [phone] Fax: [phone]    Primary Care Provider: Femi Mcpherson DO    Primary Insurance: [employer]  Secondary Insurance:     DISCHARGE DETAILS:    Discharge planning discussed with:: daughter was called 10:27 am  Freedom of Choice: Yes  Comments - Freedom of Choice: permission was given to send referral for rehab & long term care- pt was at [facility]- referrals sent via [facility]  CM contacted family/caregiver?: Yes             Contacts  Patient Contacts: Norma Sandhu  Relationship to Patient:: Family (daughter)  Contact Method: Phone  Phone Number: [phone]  Reason/Outcome: Discharge Planning    Requested Home Health Care         Is the patient interested in HHC at discharge?: No    DME Referral Provided  Referral made for DME?: No    Other Referral/Resources/Interventions Provided:  Interventions: SNF, Short Term Rehab  Referral Comments: referrals sent  via [facility] for snf rehab a& long term care    Would you like to participate in our [facility] service program?  : No - Declined    Treatment Team Recommendation:  (snf rehab & long term care referrals [facility] - TBD)                                         Family notified:: daughter was called

## 2025-05-21 ENCOUNTER — ANESTHESIA (INPATIENT)
Dept: GASTROENTEROLOGY | Facility: HOSPITAL | Age: 85
DRG: 291 | End: 2025-05-21
Payer: COMMERCIAL

## 2025-05-21 ENCOUNTER — ANESTHESIA EVENT (INPATIENT)
Dept: GASTROENTEROLOGY | Facility: HOSPITAL | Age: 85
DRG: 291 | End: 2025-05-21
Payer: COMMERCIAL

## 2025-05-21 ENCOUNTER — APPOINTMENT (INPATIENT)
Dept: GASTROENTEROLOGY | Facility: HOSPITAL | Age: 85
DRG: 291 | End: 2025-05-21
Attending: STUDENT IN AN ORGANIZED HEALTH CARE EDUCATION/TRAINING PROGRAM
Payer: COMMERCIAL

## 2025-05-21 PROBLEM — N30.00 ACUTE CYSTITIS WITHOUT HEMATURIA: Status: ACTIVE | Noted: 2025-05-21

## 2025-05-21 LAB
ANION GAP SERPL CALCULATED.3IONS-SCNC: 9 MMOL/L (ref 4–13)
BASOPHILS # BLD AUTO: 0.03 THOUSANDS/ÂΜL (ref 0–0.1)
BASOPHILS NFR BLD AUTO: 1 % (ref 0–1)
BUN SERPL-MCNC: 51 MG/DL (ref 5–25)
CALCIUM SERPL-MCNC: 8.7 MG/DL (ref 8.4–10.2)
CHLORIDE SERPL-SCNC: 106 MMOL/L (ref 96–108)
CO2 SERPL-SCNC: 25 MMOL/L (ref 21–32)
CREAT SERPL-MCNC: 1.9 MG/DL (ref 0.6–1.3)
EOSINOPHIL # BLD AUTO: 0.16 THOUSAND/ÂΜL (ref 0–0.61)
EOSINOPHIL NFR BLD AUTO: 4 % (ref 0–6)
ERYTHROCYTE [DISTWIDTH] IN BLOOD BY AUTOMATED COUNT: 20.3 % (ref 11.6–15.1)
GFR SERPL CREATININE-BSD FRML MDRD: 23 ML/MIN/1.73SQ M
GLUCOSE SERPL-MCNC: 88 MG/DL (ref 65–140)
HCT VFR BLD AUTO: 30.4 % (ref 34.8–46.1)
HGB BLD-MCNC: 9.1 G/DL (ref 11.5–15.4)
IMM GRANULOCYTES # BLD AUTO: 0.02 THOUSAND/UL (ref 0–0.2)
IMM GRANULOCYTES NFR BLD AUTO: 1 % (ref 0–2)
LYMPHOCYTES # BLD AUTO: 1.01 THOUSANDS/ÂΜL (ref 0.6–4.47)
LYMPHOCYTES NFR BLD AUTO: 24 % (ref 14–44)
MCH RBC QN AUTO: 28.5 PG (ref 26.8–34.3)
MCHC RBC AUTO-ENTMCNC: 29.9 G/DL (ref 31.4–37.4)
MCV RBC AUTO: 95 FL (ref 82–98)
MONOCYTES # BLD AUTO: 0.38 THOUSAND/ÂΜL (ref 0.17–1.22)
MONOCYTES NFR BLD AUTO: 9 % (ref 4–12)
NEUTROPHILS # BLD AUTO: 2.56 THOUSANDS/ÂΜL (ref 1.85–7.62)
NEUTS SEG NFR BLD AUTO: 61 % (ref 43–75)
NRBC BLD AUTO-RTO: 0 /100 WBCS
PLATELET # BLD AUTO: 188 THOUSANDS/UL (ref 149–390)
PMV BLD AUTO: 10.4 FL (ref 8.9–12.7)
POTASSIUM SERPL-SCNC: 3.9 MMOL/L (ref 3.5–5.3)
RBC # BLD AUTO: 3.19 MILLION/UL (ref 3.81–5.12)
SODIUM SERPL-SCNC: 140 MMOL/L (ref 135–147)
WBC # BLD AUTO: 4.16 THOUSAND/UL (ref 4.31–10.16)

## 2025-05-21 PROCEDURE — 85025 COMPLETE CBC W/AUTO DIFF WBC: CPT | Performed by: HOSPITALIST

## 2025-05-21 PROCEDURE — 99232 SBSQ HOSP IP/OBS MODERATE 35: CPT | Performed by: PHYSICIAN ASSISTANT

## 2025-05-21 PROCEDURE — 43235 EGD DIAGNOSTIC BRUSH WASH: CPT | Performed by: STUDENT IN AN ORGANIZED HEALTH CARE EDUCATION/TRAINING PROGRAM

## 2025-05-21 PROCEDURE — 0DJ08ZZ INSPECTION OF UPPER INTESTINAL TRACT, VIA NATURAL OR ARTIFICIAL OPENING ENDOSCOPIC: ICD-10-PCS | Performed by: HOSPITALIST

## 2025-05-21 PROCEDURE — 99232 SBSQ HOSP IP/OBS MODERATE 35: CPT | Performed by: HOSPITALIST

## 2025-05-21 PROCEDURE — 80048 BASIC METABOLIC PNL TOTAL CA: CPT | Performed by: HOSPITALIST

## 2025-05-21 RX ORDER — FUROSEMIDE 40 MG/1
40 TABLET ORAL
Status: DISCONTINUED | OUTPATIENT
Start: 2025-05-21 | End: 2025-05-22 | Stop reason: HOSPADM

## 2025-05-21 RX ORDER — PROPOFOL 10 MG/ML
INJECTION, EMULSION INTRAVENOUS AS NEEDED
Status: DISCONTINUED | OUTPATIENT
Start: 2025-05-21 | End: 2025-05-21

## 2025-05-21 RX ORDER — PHENYLEPHRINE HCL IN 0.9% NACL 1 MG/10 ML
SYRINGE (ML) INTRAVENOUS AS NEEDED
Status: DISCONTINUED | OUTPATIENT
Start: 2025-05-21 | End: 2025-05-21

## 2025-05-21 RX ADMIN — Medication 100 MCG: at 14:44

## 2025-05-21 RX ADMIN — PROPOFOL 30 MG: 10 INJECTION, EMULSION INTRAVENOUS at 14:42

## 2025-05-21 RX ADMIN — ATORVASTATIN CALCIUM 40 MG: 40 TABLET, FILM COATED ORAL at 16:13

## 2025-05-21 RX ADMIN — HYDROCODONE BITARTRATE AND HOMATROPINE METHYLBROMIDE 5 ML: 1.5; 5 SOLUTION ORAL at 16:13

## 2025-05-21 RX ADMIN — FUROSEMIDE 40 MG: 40 TABLET ORAL at 16:13

## 2025-05-21 RX ADMIN — ERTAPENEM SODIUM 500 MG: 1 INJECTION, POWDER, LYOPHILIZED, FOR SOLUTION INTRAMUSCULAR; INTRAVENOUS at 17:32

## 2025-05-21 RX ADMIN — RIVAROXABAN 15 MG: 15 TABLET, FILM COATED ORAL at 16:13

## 2025-05-21 RX ADMIN — PROPOFOL 60 MG: 10 INJECTION, EMULSION INTRAVENOUS at 14:39

## 2025-05-21 RX ADMIN — NYSTATIN: 100000 POWDER TOPICAL at 10:17

## 2025-05-21 RX ADMIN — NYSTATIN: 100000 POWDER TOPICAL at 17:34

## 2025-05-21 NOTE — ASSESSMENT & PLAN NOTE
Hemoglobin was 6.6 at time of arrival which might have been a contributing factor to her initial complaint of shortness of breath   Patient has been transfused 2 units of packed red blood cells on this admission   Follow-up hemoglobins have remained stable  Suspect that this is multifactorial in the setting of having anemia of chronic kidney disease, rheumatoid arthritis and iron deficiency anemia  Patient denies any visible blood loss, denies hematemesis, hemoptysis, and/or blood loss per rectum hemoglobin 6.6 on presentation   Hold off on IV Venofer in the setting of significant volume overload  Continue to monitor H&H

## 2025-05-21 NOTE — PLAN OF CARE
Problem: PAIN - ADULT  Goal: Verbalizes/displays adequate comfort level or baseline comfort level  Description: Interventions:  - Encourage patient to monitor pain and request assistance  - Assess pain using appropriate pain scale  - Administer analgesics as ordered based on type and severity of pain and evaluate response  - Implement non-pharmacological measures as appropriate and evaluate response  - Consider cultural and social influences on pain and pain management  - Notify physician/advanced practitioner if interventions unsuccessful or patient reports new pain  - Educate patient/family on pain management process including their role and importance of  reporting pain   - Provide non-pharmacologic/complimentary pain relief interventions  Outcome: Progressing     Problem: INFECTION - ADULT  Goal: Absence or prevention of progression during hospitalization  Description: INTERVENTIONS:  - Assess and monitor for signs and symptoms of infection  - Monitor lab/diagnostic results  - Monitor all insertion sites, i.e. indwelling lines, tubes, and drains  - Monitor endotracheal if appropriate and nasal secretions for changes in amount and color  - [place] appropriate cooling/warming therapies per order  - Administer medications as ordered  - Instruct and encourage patient and family to use good hand hygiene technique  Outcome: Progressing  Goal: Absence of fever/infection during neutropenic period  Description: INTERVENTIONS:  - Monitor WBC  - Perform strict hand hygiene  - Limit to healthy visitors only  - No plants, dried, fresh or silk flowers with asif in patient room  Outcome: Progressing  Goal: Infection Control Precautions  Outcome: Progressing     Problem: SAFETY ADULT  Goal: Patient will remain free of falls  Description: INTERVENTIONS:  - Educate patient/family on patient safety including physical limitations  - Instruct patient to call for assistance with activity   - Consider consulting OT/PT to assist  with strengthening/mobility based on AM PAC & JH-HLM score  - Consult OT/PT to assist with strengthening/mobility   - Keep Call bell within reach  - Keep bed low and locked with side rails adjusted as appropriate  - Keep care items and personal belongings within reach  - Initiate and maintain comfort rounds  - Make Fall Risk Sign visible to staff  - Offer Toileting every 2 Hours, in advance of need  - Initiate/Maintain bed/chair alarm  - Obtain necessary fall risk management equipment  - Apply yellow socks and bracelet for high fall risk patients  - Consider moving patient to room near nurses station  Outcome: Progressing  Goal: Maintain or return to baseline ADL function  Description: INTERVENTIONS:  -  Assess patient's ability to carry out ADLs; assess patient's baseline for ADL function and identify physical deficits which impact ability to perform ADLs (bathing, care of mouth/teeth, toileting, grooming, dressing, etc.)  - Assess/evaluate cause of self-care deficits   - Assess range of motion  - Assess patient's mobility; develop plan if impaired  - Assess patient's need for assistive devices and provide as appropriate  - Encourage maximum independence but intervene and supervise when necessary  - Involve family in performance of ADLs  - Assess for home care needs following discharge   - Consider OT consult to assist with ADL evaluation and planning for discharge  - Provide patient education as appropriate  - Monitor functional capacity and physical performance, use of AM PAC & JH-HLM   - Monitor gait, balance and fatigue with ambulation    Outcome: Progressing  Goal: Maintains/Returns to pre admission functional level  Description: INTERVENTIONS:  - Perform AM-PAC 6 Click Basic Mobility/ Daily Activity assessment daily.  - Set and communicate daily mobility goal to care team and patient/family/caregiver.   - Collaborate with rehabilitation services on mobility goals if consulted  - Perform Range of Motion 3  times a day.  - Reposition patient every 2 hours.  - Dangle patient 3 times a day  - Stand patient 3 times a day  - Out of bed to chair 3 times a day   - Out of bed for meals 3 times a day  - Out of bed for toileting  - Record patient progress and toleration of activity level   Outcome: Progressing     Problem: DISCHARGE PLANNING  Goal: Discharge to home or other facility with appropriate resources  Description: INTERVENTIONS:  - Identify barriers to discharge w/patient and caregiver  - Arrange for needed discharge resources and transportation as appropriate  - Identify discharge learning needs (meds, wound care, etc.)  - Refer to Case Management Department for coordinating discharge planning if the patient needs post-hospital services based on physician/advanced practitioner order or complex needs related to functional status, cognitive ability, or social support system  Outcome: Progressing     Problem: Knowledge Deficit  Goal: Patient/family/caregiver demonstrates understanding of disease process, treatment plan, medications, and discharge instructions  Description: Complete learning assessment and assess knowledge base.  Interventions:  - Provide teaching at level of understanding  - Provide teaching via preferred learning methods  Outcome: Progressing     Problem: Prexisting or High Potential for Compromised Skin Integrity  Goal: Skin integrity is maintained or improved  Description: INTERVENTIONS:  - Identify patients at risk for skin breakdown  - Assess and monitor skin integrity including under and around medical devices   - Assess and monitor nutrition and hydration status  - Monitor labs  - Assess for incontinence   - Turn and reposition patient  - Assist with mobility/ambulation  - Relieve pressure over roselyn prominences   - Avoid friction and shearing  - Provide appropriate hygiene as needed including keeping skin clean and dry  - Evaluate need for skin moisturizer/barrier cream  - Collaborate with  interdisciplinary team  - Patient/family teaching  - Consider wound care consult    Assess:  - Review Marcelo scale daily  - Clean and moisturize skin every day  - Inspect skin when repositioning, toileting, and assisting with ADLS  - Assess under medical devices such as mepilex every shift  - Assess extremities for adequate circulation and sensation     Bed Management:  - Have minimal linens on bed & keep smooth, unwrinkled  - Change linens as needed when moist or perspiring  - Avoid sitting or lying in one position for more than 2 hours while in bed?Keep HOB at 30degrees   - Toileting:  - Offer bedside commode  - Assess for incontinence every 2 hours  - Use incontinent care products after each incontinent episode such as barrier cream    Activity:  - Mobilize patient 3 times a day  - Encourage activity and walks on unit  - Encourage or provide ROM exercises   - Turn and reposition patient every 2 Hours  - Use appropriate equipment to lift or move patient in bed  - Instruct/ Assist with weight shifting every 2 when out of bed in chair  - Consider limitation of chair time 2 hour intervals    Skin Care:  - Avoid use of baby powder, tape, friction and shearing, hot water or constrictive clothing  - Relieve pressure over bony prominences using mepilex  - Do not massage red bony areas    Next Steps:  - Teach patient strategies to minimize risks such as shifting weight   - Consider consults to  interdisciplinary teams such as wound care  Outcome: Progressing     Problem: Decreased Cardiac Output  Goal: Cardiac output adequate for individual needs  Description: INTERVENTIONS: Monitor for signs and symptoms of decreased cardiac output   - Monitor for dyspnea with exertion and at rest  - Monitor for orthopnea  - Monitor for signs of tachycardia. Place patient on telemetry monitoring.  - Assess patient for jugular vein distention  - Assess patient for lower extremity edema and poor peripheral perfusion   - Auscultate lung  sound for Fine bibasilar crackles   - Monitor for cardiac arrythmias   - Administer beta blockers, antiarrhythmic, and blood pressure medications as ordered    Outcome: Progressing     Problem: Impaired Gas Exchange  Goal: Optimize oxygenation and ensure adequate ventilation  Description: INTERVENTIONS: Monitor for signs and symptoms of respiratory distress                - Elevate HOB or use high fowlers to promote lung expansion                - Administer oxygen as ordered to maintain adequate oxygenation                - Encourage use of IS to promote lung expansion and prevent PN                - Monitor ABGs to assess oxygenation status                - Monitor blood oxygen level to maintain adequate oxygenation                - Encourage cough and deep breathing exercises to promote lung expansion                - Monitor patient's mental status for increased confusion    Outcome: Progressing     Problem: Excess Fluid Volume  Goal: Patient is able to achieve and maintain homeostasis  Description: INTERVENTIONS: Monitor for sign and symptoms of fluid overload  - Evaluate LE edema every shift  - Elevate LE to prevent dependent edema  - Apply EZEKIEL stockings as ordered   - Monitor ankle circumference daily  - Assess for jugular vein distention  - Evaluate provider orders for the CHF diuretic algorithm. Administer diuretics as ordered  - Weigh the patient daily at 0600 and report a weight gain of five pounds or more   - Strict intake and output  - Monitor fluid intake and adhere to fluid restrictions  - Assess lung sounds every shift and as needed  - Monitor vital signs and lab values (CBC, chem, BUN, BNP)  - Measure and document urine output    Outcome: Progressing     Problem: Activity Intolerance  Goal: Patient is able to perform activities within their limitations  Description: INTERVENTIONS:                       -   Alternate periods of activity with periods of rest                 -   Patients is able to  maintain normal vitals heart rhythm during activity                 -   Gradually increase activity and exercise as patient can tolerate                 -   Monitor blood pressure and heart before and after exercise                  -   Monitor blood oxygen saturation during activity and apply oxygen as needed    Outcome: Progressing     Problem: Knowledge Deficit  Goal: Patient is able to verbalize understanding of Heart Failure after education  Description: INTERVENTIONS:  - Educate the patient and family on signs and symptoms of HF  - Provide the patient with HF education and HF zone tool  - Educate on the importance of daily weight in the AM and reporting a weight gain               of 3 or more pounds to their primary care physician  - Monitor for SOB  - Maintain and sodium and fluid restriction  - Educate the patient on the importance of medications such as: diuretics, betablockers,               antiarrhythmics and their purpose, dose, route, side effects and labs               if they are needed    Outcome: Progressing     Problem: Potential for Falls  Goal: Patient will remain free of falls  Description: INTERVENTIONS:  - Educate patient/family on patient safety including physical limitations  - Instruct patient to call for assistance with activity   - Consider consulting OT/PT to assist with strengthening/mobility based on AM PAC & JH-HLM score  - Consult OT/PT to assist with strengthening/mobility   - Keep Call bell within reach  - Keep bed low and locked with side rails adjusted as appropriate  - Keep care items and personal belongings within reach  - Initiate and maintain comfort rounds  - Make Fall Risk Sign visible to staff  - Offer Toileting every 2 Hours, in advance of need  - Initiate/Maintain bed/chair alarm  - Obtain necessary fall risk management equipment  - Apply yellow socks and bracelet for high fall risk patients  - Consider moving patient to room near nurses station  Outcome: Progressing

## 2025-05-21 NOTE — PROGRESS NOTES
Progress Note - Hospitalist   Name: Riri Cuellar 85 y.o. female I MRN: [id number]  Unit/Bed#: -01 I Date of Admission: 5/17/2025   Date of Service: 5/21/2025 I Hospital Day: 4    Assessment & Plan  Acute on chronic diastolic congestive heart failure (HCC)  Patient presents with shortness of breath and significant lower extremity edema  Arrival   Appreciate cardiology input  Patient has diuresed almost 1.941 liters of fluid thus far   2D echocardiogram results reviewed-EF of 55%, see the full report for additional details  Continue goal-directed medical therapy with the following medications:-  #1.  Xarelto 15 mg p.o. daily  #2.  Toprol-XL 25 mg p.o. daily  #3.  Atorvastatin 40 mg p.o. daily  #4.  Status post treatment with 50 mg of IV Lasix twice daily thus far, patient was transitioned to 40 mg of p.o. Lasix twice daily today in anticipation of discharge to rehab tomorrow  Elevated troponins-  Troponin trend as follows-53, 43  Patient declined any further invasive testing, treatment, and/or workup such as a cardiac catheterization  Most likely none myocardial injury related elevated troponins in the setting of having CHF, and chronic kidney disease  Continue to monitor daily weight, input and output  Repeat blood work in the a.m.  Anemia  Hemoglobin was 6.6 at time of arrival which might have been a contributing factor to her initial complaint of shortness of breath   Patient has been transfused 2 units of packed red blood cells on this admission   Follow-up hemoglobins have remained stable  Suspect that this is multifactorial in the setting of having anemia of chronic kidney disease, rheumatoid arthritis and iron deficiency anemia  Patient denies any visible blood loss, denies hematemesis, hemoptysis, and/or blood loss per rectum hemoglobin 6.6 on presentation   Hold off on IV Venofer in the setting of significant volume overload  Continue to monitor H&H  Stage 4 chronic kidney disease (HCC)  Lab  Results   Component Value Date    EGFR 23 05/21/2025    EGFR 25 05/20/2025    EGFR 21 05/19/2025    CREATININE 1.90 (H) 05/21/2025    CREATININE 1.81 (H) 05/20/2025    CREATININE 2.04 (H) 05/19/2025   Unclear baseline as patient has not had BMP since 2017  Creatinine 1.95 on presentation  Creatinine had dropped down but is inching back up possibly secondary to intravascular depletion  Anticipate improvement tomorrow another IV Lasix has been discontinued  Defer additional management to nephrology  Discharge planning for tomorrow  Dysphagia  Patient was seen by speech therapy  and had a video swallow evaluation-recommendations included thin liquids and a dysphagia level 2 diet mechanical soft  Nursing reports that despite this the patient continues to extensively cough with every meal  GI was consulted on 5/20/2025  Status post an EGD earlier today on 5/21/2025-patient was found to have a Large type III hiatal hernia   Continue diet as outlined above  Speech therapy to reevaluate tomorrow  Anticipate discharge planning to rehab tomorrow, and over the next couple of weeks if the patient does not do well patient will need referral to meet with cardiothoracic surgery if indeed the patient and family choose to do that  Paroxysmal atrial fibrillation (HCC)  Continue Toprol-XL for rate control  Continue Xarelto for anticoagulation purposes  Gastroesophageal reflux disease without esophagitis  Continue Protonix  Rheumatoid arthritis (HCC)  History of RA on methotrexate  Outpatient follow-up with Rheumatology  Acute cystitis without hematuria  Urine culture from admission revealed an ESBL Klebsiella pneumoniae UTI  Patient is completely asymptomatic, has had no fevers, and does not have a leukocytosis  Will start IV ertapenem    VTE Pharmacologic Prophylaxis: VTE Score: 6 High Risk (Score >/= 5) - Pharmacological DVT Prophylaxis Ordered: rivaroxaban (Xarelto). Sequential Compression Devices Ordered.    Mobility:   Basic  Mobility Inpatient Raw Score: 16  JH-HLM Goal: 5: Stand one or more mins  JH-HLM Achieved: 2: Bed activities/Dependent transfer  JH-HLM Goal achieved. Continue to encourage appropriate mobility.    Patient Centered Rounds: I performed bedside rounds with nursing staff today.   Discussions with Specialists or Other Care Team Provider: GI, cardiology    Education and Discussions with Family / Patient: Patient's grandson brought up to part bedside, patient's daughter brought up to follow-up by telephone call.     Current Length of Stay: 4 day(s)  Current Patient Status: Inpatient   Certification Statement: The patient will continue to require additional inpatient hospital stay due to continued diuretics  Discharge Plan: Anticipate discharge tomorrow to rehab facility.    Code Status: Level 3 - DNAR and DNI    Subjective   Patient seen, has just returned from EGD testing, doing okay, feels hungry    Objective :  Temp:  [96.7 °F (35.9 °C)-97.6 °F (36.4 °C)] 96.7 °F (35.9 °C)  HR:  [77-97] 80  BP: ()/(45-82) 122/75  Resp:  [16-20] 20  SpO2:  [93 %-99 %] 98 %  O2 Device: None (Room air)  Nasal Cannula O2 Flow Rate (L/min):  [2 L/min] 2 L/min    Body mass index is 41.25 kg/m².     Input and Output Summary (last 24 hours):     Intake/Output Summary (Last 24 hours) at 5/21/2025 1707  Last data filed at 5/21/2025 0501  Gross per 24 hour   Intake --   Output 500 ml   Net -500 ml       Physical Exam  Constitutional:       General: She is not in acute distress.     Appearance: Normal appearance. She is normal weight. She is not ill-appearing.   HENT:      Head: Normocephalic and atraumatic.      Nose: Nose normal.      Mouth/Throat:      Mouth: Mucous membranes are moist.     Eyes:      Extraocular Movements: Extraocular movements intact.      Pupils: Pupils are equal, round, and reactive to light.       Cardiovascular:      Rate and Rhythm: Normal rate and regular rhythm.      Pulses: Normal pulses.      Heart sounds:  Normal heart sounds. No murmur heard.     No friction rub. No gallop.   Pulmonary:      Effort: Pulmonary effort is normal. No respiratory distress.      Breath sounds: Normal breath sounds. No wheezing, rhonchi or rales.   Abdominal:      General: There is no distension.      Palpations: Abdomen is soft. There is no mass.      Tenderness: There is no abdominal tenderness.      Hernia: No hernia is present.     Musculoskeletal:         General: No swelling or tenderness. Normal range of motion.      Cervical back: Normal range of motion and neck supple. No rigidity.      Right lower leg: No edema.      Left lower leg: No edema.     Skin:     General: Skin is warm.      Capillary Refill: Capillary refill takes less than 2 seconds.      Findings: No erythema or rash.     Neurological:      General: No focal deficit present.      Mental Status: She is alert and oriented to person, place, and time. Mental status is at baseline.      Cranial Nerves: No cranial nerve deficit.      Motor: No weakness.     Psychiatric:         Mood and Affect: Mood normal.         Behavior: Behavior normal.           Lines/Drains:  Lines/Drains/Airways       Active Status       Name Placement date Placement time Site Days    External Urinary Catheter 05/17/25  1653  -- 4                            Lab Results: I have reviewed the following results:   Results from last 7 days   Lab Units 05/21/25  0442   WBC Thousand/uL 4.16*   HEMOGLOBIN g/dL 9.1*   HEMATOCRIT % 30.4*   PLATELETS Thousands/uL 188   SEGS PCT % 61   LYMPHO PCT % 24   MONO PCT % 9   EOS PCT % 4     Results from last 7 days   Lab Units 05/21/25  0442 05/18/25  0512 05/17/25  1551   SODIUM mmol/L 140   < > 140   POTASSIUM mmol/L 3.9   < > 4.0   CHLORIDE mmol/L 106   < > 109*   CO2 mmol/L 25   < > 21   BUN mg/dL 51*   < > 47*   CREATININE mg/dL 1.90*   < > 1.95*   ANION GAP mmol/L 9   < > 10   CALCIUM mg/dL 8.7   < > 8.6   ALBUMIN g/dL  --   --  4.0   TOTAL BILIRUBIN mg/dL  --    --  0.63   ALK PHOS U/L  --   --  63   ALT U/L  --   --  20   AST U/L  --   --  29   GLUCOSE RANDOM mg/dL 88   < > 178*    < > = values in this interval not displayed.     Results from last 7 days   Lab Units 05/17/25  1551   INR  1.65*     Results from last 7 days   Lab Units 05/19/25  1247   POC GLUCOSE mg/dl 112         Results from last 7 days   Lab Units 05/17/25  1933 05/17/25  1551   LACTIC ACID mmol/L 2.3* 2.1*   PROCALCITONIN ng/ml  --  0.06       Recent Cultures (last 7 days):   Results from last 7 days   Lab Units 05/17/25  1713 05/17/25  1632 05/17/25  1551   BLOOD CULTURE   --  No Growth at 72 hrs. No Growth at 72 hrs.   URINE CULTURE  >100,000 cfu/ml Klebsiella pneumoniae ESBL*  --   --        Imaging Results Review: I reviewed radiology reports from this admission including: EGD report.  Other Study Results Review: No additional pertinent studies reviewed.    Last 24 Hours Medication List:     Current Facility-Administered Medications:     acetaminophen (TYLENOL) tablet 650 mg, Q6H PRN    allopurinol (ZYLOPRIM) tablet 100 mg, Daily    atorvastatin (LIPITOR) tablet 40 mg, Daily With Dinner    ertapenem (INVanz) 500 mg in sodium chloride 0.9 % 50 mL IVPB, Q24H    furosemide (LASIX) tablet 40 mg, BID (diuretic)    HYDROcodone Bit-Homatrop MBr (HYCODAN) oral syrup 5 mL, Q4H PRN    metoprolol succinate (TOPROL-XL) 24 hr tablet 25 mg, Daily    nystatin (MYCOSTATIN) powder, BID    ondansetron (ZOFRAN) injection 4 mg, Q6H PRN    pantoprazole (PROTONIX) EC tablet 40 mg, Early Morning    rivaroxaban (XARELTO) tablet 15 mg, Daily With Dinner    Administrative Statements   Today, Patient Was Seen By: Elaine Lucas MD      **Please Note: This note may have been constructed using a voice recognition system.**

## 2025-05-21 NOTE — ASSESSMENT & PLAN NOTE
Wt Readings from Last 3 Encounters:   05/21/25 95.8 kg (211 lb 3.2 oz)     EF preserved, BNP minimally elevated, though patient still examines mildly hypervolemic  Patient declined invasive or aggressive treatments or strategies including ischemic evaluation but is agreeable to medical therapy  Currently on IV diuretic    Would transition to PO Lasix and plan for discharge in the next 24 hours    Lasix 40 mg BID   Weight stable net loss -1.94  Continue metoprolol succinate 25 mg daily  Low-sodium diet, fluid restriction, daily weights on a standing scale if able, I/O

## 2025-05-21 NOTE — ANESTHESIA PREPROCEDURE EVALUATION
Procedure:  EGD    Relevant Problems   CARDIO   (+) Acute on chronic diastolic congestive heart failure (HCC)   (+) Paroxysmal atrial fibrillation (HCC)      GI/HEPATIC   (+) Dysphagia   (+) Gastroesophageal reflux disease without esophagitis      /RENAL   (+) Stage 4 chronic kidney disease (HCC)      HEMATOLOGY   (+) Anemia      MUSCULOSKELETAL   (+) Rheumatoid arthritis (HCC)      Echo 4/17  Small left ventricular chamber size. Normal left ventricular systolic     function. Normal regional wall motion. Concentric remodeling. Estimated     left ventricular ejection fraction is 70%.      Mild left atrial enlargement    Aortic sclerosis. Moderate aortic regurgitation.     Mitral annular calcification. Normal pulmonary artery systolic pressure.       NPO 5/20    Physical Exam    Airway     Mallampati score: I  TM Distance: >3 FB  Neck ROM: full      Cardiovascular  Cardiovascular exam normal    Dental    edentulous,     Pulmonary  Pulmonary exam normal     Neurological      Other Findings  post-pubertal.      Anesthesia Plan  ASA Score- 3     Anesthesia Type- IV sedation with anesthesia with ASA Monitors.         Additional Monitors:     Airway Plan:            Plan Factors-Exercise tolerance (METS): >4 METS.    Chart reviewed. EKG reviewed.  Existing labs reviewed. Patient summary reviewed.          Obstructive sleep apnea risk education given perioperatively.        Induction- intravenous.    Postoperative Plan- .   Monitoring Plan - Monitoring plan - standard ASA monitoring  Post Operative Pain Plan - non-opiod analgesics and multimodal analgesia    Perioperative Resuscitation Plan - Level 1 - Full Code.       Informed Consent- Anesthetic plan and risks discussed with patient.  I personally reviewed this patient with the CRNA. Discussed and agreed on the Anesthesia Plan with the CRNA..      NPO Status:  Vitals Value Taken Time   Date of last liquid 05/20/25 05/21/25 13:11   Time of last liquid 2200 05/21/25 13:11    Date of last solid 05/20/25 05/21/25 13:11   Time of last solid 1700 05/21/25 13:11

## 2025-05-21 NOTE — QUICK NOTE
GASTROENTEROLOGY QUICK NOTE:     Vitals and labs reviewed.  Hemoglobin remains stable.  Plan for EGD this afternoon for evaluation of dysphagia.  Further recommendations to follow in procedure report.    DO ZULEYKA Bains Gastroenterology Fellow, PGY-4

## 2025-05-21 NOTE — ASSESSMENT & PLAN NOTE
Patient presents with shortness of breath and significant lower extremity edema  Arrival   Appreciate cardiology input  Patient has diuresed almost 1.941 liters of fluid thus far   2D echocardiogram results reviewed-EF of 55%, see the full report for additional details  Continue goal-directed medical therapy with the following medications:-  #1.  Xarelto 15 mg p.o. daily  #2.  Toprol-XL 25 mg p.o. daily  #3.  Atorvastatin 40 mg p.o. daily  #4.  Status post treatment with 50 mg of IV Lasix twice daily thus far, patient was transitioned to 40 mg of p.o. Lasix twice daily today in anticipation of discharge to rehab tomorrow  Elevated troponins-  Troponin trend as follows-53, 43  Patient declined any further invasive testing, treatment, and/or workup such as a cardiac catheterization  Most likely none myocardial injury related elevated troponins in the setting of having CHF, and chronic kidney disease  Continue to monitor daily weight, input and output  Repeat blood work in the a.m.

## 2025-05-21 NOTE — ASSESSMENT & PLAN NOTE
Lab Results   Component Value Date    EGFR 23 05/21/2025    EGFR 25 05/20/2025    EGFR 21 05/19/2025    CREATININE 1.90 (H) 05/21/2025    CREATININE 1.81 (H) 05/20/2025    CREATININE 2.04 (H) 05/19/2025   Unclear baseline as patient has not had BMP since 2017  Creatinine 1.95 on presentation  Creatinine had dropped down but is inching back up possibly secondary to intravascular depletion  Anticipate improvement tomorrow another IV Lasix has been discontinued  Defer additional management to nephrology  Discharge planning for tomorrow

## 2025-05-21 NOTE — DISCHARGE SUPPORT
Case Management Assessment & Discharge Planning Note    Patient name Riri Cuellar  Location /-01 MRN [id number]  : 1940 Date 2025       Current Admission Date: 2025  Current Admission Diagnosis:Acute on chronic diastolic congestive heart failure (HCC)   Patient Active Problem List    Diagnosis Date Noted    Dysphagia 2025    Acute on chronic diastolic congestive heart failure (HCC) 2025    Paroxysmal atrial fibrillation (HCC) 2025    Gastroesophageal reflux disease without esophagitis 2025    Rheumatoid arthritis (HCC) 2025    Anemia 2025    Stage 4 chronic kidney disease (HCC) 2025      LOS (days): 4  Geometric Mean LOS (GMLOS) (days): 3  Days to GMLOS:-0.7   Facility Authorization Approved  [facility] received approved auth for: SNF  Insurance: [employer]  Authorization Obtained Via: Phone  Name/Phone # of Rep who called in determination: Fouzia [phone]  Facility Name: [facility]  Approved Authorization Number: [id number]  Start of Care Date: 25  Next Review Date: 25  Submit Next Review to: F: [phone]   notified: Sia OROZCO     Updates to authorization status will be noted in chart.    Please reach out to CM for updates on any clinical information.

## 2025-05-21 NOTE — PLAN OF CARE
Problem: PAIN - ADULT  Goal: Verbalizes/displays adequate comfort level or baseline comfort level  Description: Interventions:  - Encourage patient to monitor pain and request assistance  - Assess pain using appropriate pain scale  - Administer analgesics as ordered based on type and severity of pain and evaluate response  - Implement non-pharmacological measures as appropriate and evaluate response  - Consider cultural and social influences on pain and pain management  - Notify physician/advanced practitioner if interventions unsuccessful or patient reports new pain  - Educate patient/family on pain management process including their role and importance of  reporting pain   - Provide non-pharmacologic/complimentary pain relief interventions  Outcome: Progressing     Problem: INFECTION - ADULT  Goal: Absence or prevention of progression during hospitalization  Description: INTERVENTIONS:  - Assess and monitor for signs and symptoms of infection  - Monitor lab/diagnostic results  - Monitor all insertion sites, i.e. indwelling lines, tubes, and drains  - Monitor endotracheal if appropriate and nasal secretions for changes in amount and color  - Crocker appropriate cooling/warming therapies per order  - Administer medications as ordered  - Instruct and encourage patient and family to use good hand hygiene technique  Outcome: Progressing  Goal: Absence of fever/infection during neutropenic period  Description: INTERVENTIONS:  - Monitor WBC  - Perform strict hand hygiene  - Limit to healthy visitors only  - No plants, dried, fresh or silk flowers with asif in patient room  Outcome: Progressing     Problem: SAFETY ADULT  Goal: Patient will remain free of falls  Description: INTERVENTIONS:  - Educate patient/family on patient safety including physical limitations  - Instruct patient to call for assistance with activity   - Consider consulting OT/PT to assist with strengthening/mobility based on AM PAC & JH-HLM score  -  Consult OT/PT to assist with strengthening/mobility   - Keep Call bell within reach  - Keep bed low and locked with side rails adjusted as appropriate  - Keep care items and personal belongings within reach  - Initiate and maintain comfort rounds  - Make Fall Risk Sign visible to staff  - Offer Toileting every 2 Hours, in advance of need  - Initiate/Maintain bed/chair alarm  - Obtain necessary fall risk management equipment  - Apply yellow socks and bracelet for high fall risk patients  - Consider moving patient to room near nurses station  Outcome: Progressing  Goal: Maintain or return to baseline ADL function  Description: INTERVENTIONS:  -  Assess patient's ability to carry out ADLs; assess patient's baseline for ADL function and identify physical deficits which impact ability to perform ADLs (bathing, care of mouth/teeth, toileting, grooming, dressing, etc.)  - Assess/evaluate cause of self-care deficits   - Assess range of motion  - Assess patient's mobility; develop plan if impaired  - Assess patient's need for assistive devices and provide as appropriate  - Encourage maximum independence but intervene and supervise when necessary  - Involve family in performance of ADLs  - Assess for home care needs following discharge   - Consider OT consult to assist with ADL evaluation and planning for discharge  - Provide patient education as appropriate  - Monitor functional capacity and physical performance, use of AM PAC & JH-HLM   - Monitor gait, balance and fatigue with ambulation    Outcome: Progressing  Goal: Maintains/Returns to pre admission functional level  Description: INTERVENTIONS:  - Perform AM-PAC 6 Click Basic Mobility/ Daily Activity assessment daily.  - Set and communicate daily mobility goal to care team and patient/family/caregiver.   - Collaborate with rehabilitation services on mobility goals if consulted  - Perform Range of Motion 3 times a day.  - Reposition patient every 2 hours.  - Dangle  patient 3 times a day  - Stand patient 3 times a day  - Out of bed to chair 3 times a day   - Out of bed for meals 3 times a day  - Out of bed for toileting  - Record patient progress and toleration of activity level   Outcome: Progressing     Problem: DISCHARGE PLANNING  Goal: Discharge to home or other facility with appropriate resources  Description: INTERVENTIONS:  - Identify barriers to discharge w/patient and caregiver  - Arrange for needed discharge resources and transportation as appropriate  - Identify discharge learning needs (meds, wound care, etc.)  - Refer to Case Management Department for coordinating discharge planning if the patient needs post-hospital services based on physician/advanced practitioner order or complex needs related to functional status, cognitive ability, or social support system  Outcome: Progressing     Problem: Knowledge Deficit  Goal: Patient/family/caregiver demonstrates understanding of disease process, treatment plan, medications, and discharge instructions  Description: Complete learning assessment and assess knowledge base.  Interventions:  - Provide teaching at level of understanding  - Provide teaching via preferred learning methods  Outcome: Progressing     Problem: Prexisting or High Potential for Compromised Skin Integrity  Goal: Skin integrity is maintained or improved  Description: INTERVENTIONS:  - Identify patients at risk for skin breakdown  - Assess and monitor skin integrity including under and around medical devices   - Assess and monitor nutrition and hydration status  - Monitor labs  - Assess for incontinence   - Turn and reposition patient  - Assist with mobility/ambulation  - Relieve pressure over roselyn prominences   - Avoid friction and shearing  - Provide appropriate hygiene as needed including keeping skin clean and dry  - Evaluate need for skin moisturizer/barrier cream  - Collaborate with interdisciplinary team  - Patient/family teaching  - Consider  wound care consult    Assess:  - Review Marcelo scale daily  - Clean and moisturize skin every   - Inspect skin when repositioning, toileting, and assisting with ADLS  - Assess under medical devices such as  every   - Assess extremities for adequate circulation and sensation     Bed Management:  - Have minimal linens on bed & keep smooth, unwrinkled  - Change linens as needed when moist or perspiring  - Avoid sitting or lying in one position for more than  hours while in bed?Keep HOB at degrees   - Toileting:  - Offer bedside commode  - Assess for incontinence every   - Use incontinent care products after each incontinent episode such as     Activity:  - Mobilize patient  times a day  - Encourage activity and walks on unit  - Encourage or provide ROM exercises   - Turn and reposition patient every  Hours  - Use appropriate equipment to lift or move patient in bed  - Instruct/ Assist with weight shifting every  when out of bed in chair  - Consider limitation of chair time  hour intervals    Skin Care:  - Avoid use of baby powder, tape, friction and shearing, hot water or constrictive clothing  - Relieve pressure over bony prominences using   - Do not massage red bony areas    Next Steps:  - Teach patient strategies to minimize risks such as   - Consider consults to  interdisciplinary teams such as  Outcome: Progressing     Problem: Decreased Cardiac Output  Goal: Cardiac output adequate for individual needs  Description: INTERVENTIONS: Monitor for signs and symptoms of decreased cardiac output   - Monitor for dyspnea with exertion and at rest  - Monitor for orthopnea  - Monitor for signs of tachycardia. Place patient on telemetry monitoring.  - Assess patient for jugular vein distention  - Assess patient for lower extremity edema and poor peripheral perfusion   - Auscultate lung sound for Fine bibasilar crackles   - Monitor for cardiac arrythmias   - Administer beta blockers, antiarrhythmic, and blood pressure  medications as ordered    Outcome: Progressing     Problem: Impaired Gas Exchange  Goal: Optimize oxygenation and ensure adequate ventilation  Description: INTERVENTIONS: Monitor for signs and symptoms of respiratory distress                - Elevate HOB or use high fowlers to promote lung expansion                - Administer oxygen as ordered to maintain adequate oxygenation                - Encourage use of IS to promote lung expansion and prevent PN                - Monitor ABGs to assess oxygenation status                - Monitor blood oxygen level to maintain adequate oxygenation                - Encourage cough and deep breathing exercises to promote lung expansion                - Monitor patient's mental status for increased confusion    Outcome: Progressing     Problem: Excess Fluid Volume  Goal: Patient is able to achieve and maintain homeostasis  Description: INTERVENTIONS: Monitor for sign and symptoms of fluid overload  - Evaluate LE edema every shift  - Elevate LE to prevent dependent edema  - Apply EZEKIEL stockings as ordered   - Monitor ankle circumference daily  - Assess for jugular vein distention  - Evaluate provider orders for the CHF diuretic algorithm. Administer diuretics as ordered  - Weigh the patient daily at 0600 and report a weight gain of five pounds or more   - Strict intake and output  - Monitor fluid intake and adhere to fluid restrictions  - Assess lung sounds every shift and as needed  - Monitor vital signs and lab values (CBC, chem, BUN, BNP)  - Measure and document urine output    Outcome: Progressing     Problem: Activity Intolerance  Goal: Patient is able to perform activities within their limitations  Description: INTERVENTIONS:                       -   Alternate periods of activity with periods of rest                 -   Patients is able to maintain normal vitals heart rhythm during activity                 -   Gradually increase activity and exercise as patient can  tolerate                 -   Monitor blood pressure and heart before and after exercise                  -   Monitor blood oxygen saturation during activity and apply oxygen as needed    Outcome: Progressing     Problem: Knowledge Deficit  Goal: Patient is able to verbalize understanding of Heart Failure after education  Description: INTERVENTIONS:  - Educate the patient and family on signs and symptoms of HF  - Provide the patient with HF education and HF zone tool  - Educate on the importance of daily weight in the AM and reporting a weight gain               of 3 or more pounds to their primary care physician  - Monitor for SOB  - Maintain and sodium and fluid restriction  - Educate the patient on the importance of medications such as: diuretics, betablockers,               antiarrhythmics and their purpose, dose, route, side effects and labs               if they are needed    Outcome: Progressing     Problem: Potential for Falls  Goal: Patient will remain free of falls  Description: INTERVENTIONS:  - Educate patient/family on patient safety including physical limitations  - Instruct patient to call for assistance with activity   - Consider consulting OT/PT to assist with strengthening/mobility based on AM PAC & JH-HLM score  - Consult OT/PT to assist with strengthening/mobility   - Keep Call bell within reach  - Keep bed low and locked with side rails adjusted as appropriate  - Keep care items and personal belongings within reach  - Initiate and maintain comfort rounds  - Make Fall Risk Sign visible to staff  - Offer Toileting every 2 Hours, in advance of need  - Initiate/Maintain bed/chair alarm  - Obtain necessary fall risk management equipment  - Apply yellow socks and bracelet for high fall risk patients  - Consider moving patient to room near nurses station  Outcome: Progressing

## 2025-05-21 NOTE — PROGRESS NOTES
Progress Note - Cardiology   Name: Riri Cuellar 85 y.o. female I MRN: [id number]  Unit/Bed#: -01 I Date of Admission: 5/17/2025   Date of Service: 5/21/2025 I Hospital Day: 4    Assessment & Plan  Acute on chronic diastolic congestive heart failure (HCC)  Wt Readings from Last 3 Encounters:   05/21/25 95.8 kg (211 lb 3.2 oz)     EF preserved, BNP minimally elevated, though patient still examines mildly hypervolemic  Patient declined invasive or aggressive treatments or strategies including ischemic evaluation but is agreeable to medical therapy  Currently on IV diuretic    Would transition to PO Lasix and plan for discharge in the next 24 hours    Lasix 40 mg BID   Weight stable net loss -1.94  Continue metoprolol succinate 25 mg daily  Low-sodium diet, fluid restriction, daily weights on a standing scale if able, I/O  Paroxysmal atrial fibrillation (HCC)  Rate controlled  Continue metoprolol 25 mg daily  On Xarelto for stroke risk reduction   Hemoglobin stable, patient denies bleeding  Anemia  Presenting hemoglobin 6.6  S/p transfusion  Hgb stable, 9.1 today  Stage 4 chronic kidney disease (HCC)  Lab Results   Component Value Date    EGFR 23 05/21/2025    EGFR 25 05/20/2025    EGFR 21 05/19/2025    CREATININE 1.90 (H) 05/21/2025    CREATININE 1.81 (H) 05/20/2025    CREATININE 2.04 (H) 05/19/2025     Baseline creatinine unknown  Appreciate nephrology recommendations  Summary Comments:  Patient appears less volume overloaded today. Only mild edema present and breathing is improved. Would transition to PO diuretic and plan for discharge in the next 24 hours. Continue low-sodium fluid restricted diet Daily weights and strict I's and O's    Outpatient Cardiologist: we will make arrangements     Subjective:  Patient seen and examined at the bedside.  No events overnight.  Feeling better. No chest pain dyspnea, or palpitations. Mild edema present.     Objective:   Vitals: Blood pressure 98/58, pulse 77,  temperature (!) 96.8 °F (36 °C), temperature source Axillary, resp. rate 18, height 5' (1.524 m), weight 95.8 kg (211 lb 3.2 oz), SpO2 97%.,   Orthostatic Blood Pressures      Flowsheet Row Most Recent Value   Blood Pressure 98/58 filed at 05/21/2025 0795   Patient Position - Orthostatic VS Lying filed at 05/21/2025 0725            Telemetry/ECG/Cardiac Testing:   No tele    Echo 5/19/2025: EF 55% mild DEVONTE, mild-moderate AI moderate AAS peak velocity 3.01 m/s moderate MR mild to moderate TR    Physical Exam:  Physical Exam  Vitals and nursing note reviewed.   Constitutional:       Appearance: She is well-developed.   HENT:      Head: Normocephalic and atraumatic.      Mouth/Throat:      Mouth: Mucous membranes are moist.     Eyes:      General: No scleral icterus.     Conjunctiva/sclera: Conjunctivae normal.     Neck:      Vascular: No JVD.      Trachea: No tracheal deviation.     Cardiovascular:      Rate and Rhythm: Normal rate and regular rhythm.      Pulses: Intact distal pulses.      Heart sounds: S1 normal and S2 normal. Murmur heard.      Harsh systolic murmur is present with a grade of 3/6 at the upper right sternal border radiating to the neck.      No friction rub. No gallop.   Pulmonary:      Effort: Pulmonary effort is normal. No respiratory distress.      Breath sounds: Normal breath sounds. No wheezing or rales.      Comments: CBS  Chest:      Chest wall: No tenderness.   Abdominal:      General: Bowel sounds are normal. There is no distension.      Palpations: Abdomen is soft.      Tenderness: There is no abdominal tenderness.      Comments: Aorta not palpable      Musculoskeletal:         General: No tenderness. Normal range of motion.      Cervical back: Normal range of motion and neck supple.      Right lower leg: Edema present.      Left lower leg: Edema present.     Skin:     General: Skin is warm and dry.      Coloration: Skin is not pale.      Findings: No erythema or rash.     Neurological:       General: No focal deficit present.      Mental Status: She is alert and oriented to person, place, and time.     Psychiatric:         Mood and Affect: Mood normal.         Behavior: Behavior normal.         Judgment: Judgment normal.         Medications:  Current Medications[1]    Labs & Results:  Results from last 7 days   Lab Units 05/17/25  1551   BNP pg/mL 233*     Results from last 7 days   Lab Units 05/21/25  0442   WBC Thousand/uL 4.16*   HEMOGLOBIN g/dL 9.1*   HEMATOCRIT % 30.4*   PLATELETS Thousands/uL 188         Results from last 7 days   Lab Units 05/21/25  0442 05/20/25  0625 05/19/25  0526   POTASSIUM mmol/L 3.9   < > 4.0   CHLORIDE mmol/L 106   < > 112*   CO2 mmol/L 25   < > 25   BUN mg/dL 51*   < > 48*   CREATININE mg/dL 1.90*   < > 2.04*   MAGNESIUM mg/dL  --   --  2.0    < > = values in this interval not displayed.     Results from last 7 days   Lab Units 05/17/25  1551   INR  1.65*                  [1]   Current Facility-Administered Medications:     acetaminophen (TYLENOL) tablet 650 mg, 650 mg, Oral, Q6H PRN, Elaine Lucas MD, 650 mg at 05/20/25 1719    allopurinol (ZYLOPRIM) tablet 100 mg, 100 mg, Oral, Daily, Juan A Shirley PA-C, 100 mg at 05/20/25 0957    atorvastatin (LIPITOR) tablet 40 mg, 40 mg, Oral, Daily With Dinner, Juan A Fowler DO, 40 mg at 05/20/25 1713    furosemide (LASIX) tablet 40 mg, 40 mg, Oral, BID (diuretic), Kay Shah PA-C    HYDROcodone Bit-Homatrop MBr (HYCODAN) oral syrup 5 mL, 5 mL, Oral, Q4H PRN, Surendra Ramirez PA-C, 5 mL at 05/20/25 0028    metoprolol succinate (TOPROL-XL) 24 hr tablet 25 mg, 25 mg, Oral, Daily, Juan A Fowler DO, 25 mg at 05/19/25 0933    nystatin (MYCOSTATIN) powder, , Topical, BID, Juan A Fowler DO, Given at 05/20/25 1712    ondansetron (ZOFRAN) injection 4 mg, 4 mg, Intravenous, Q6H PRN, Juan A Fowler DO    pantoprazole (PROTONIX) EC tablet 40 mg, 40 mg, Oral, Early Morning, Juan A Fowler DO, 40 mg at 05/20/25  0632    rivaroxaban (XARELTO) tablet 15 mg, 15 mg, Oral, Daily With Dinner, Juan A Fowler DO, 15 mg at 05/20/25 9735

## 2025-05-21 NOTE — ANESTHESIA POSTPROCEDURE EVALUATION
Post-Op Assessment Note    CV Status:  Stable  Pain Score: 0    Pain management: adequate       Mental Status:  Alert   Hydration Status:  Stable   PONV Controlled:  None   Airway Patency:  Patent  Two or more mitigation strategies used for obstructive sleep apnea   Post Op Vitals Reviewed: Yes    No anethesia notable event occurred.            Last Filed PACU Vitals:  Vitals Value Taken Time   Temp 97.6 °F (36.4 °C) 05/21/25 14:49   Pulse 89 05/21/25 14:49   BP 95/53 05/21/25 14:49   Resp 18 05/21/25 14:49   SpO2 94 % 05/21/25 14:49

## 2025-05-21 NOTE — ASSESSMENT & PLAN NOTE
Rate controlled  Continue metoprolol 25 mg daily  On Xarelto for stroke risk reduction   Hemoglobin stable, patient denies bleeding

## 2025-05-21 NOTE — ASSESSMENT & PLAN NOTE
Patient was seen by speech therapy  and had a video swallow evaluation-recommendations included thin liquids and a dysphagia level 2 diet mechanical soft  Nursing reports that despite this the patient continues to extensively cough with every meal  GI was consulted on 5/20/2025  Status post an EGD earlier today on 5/21/2025-patient was found to have a Large type III hiatal hernia   Continue diet as outlined above  Speech therapy to reevaluate tomorrow  Anticipate discharge planning to rehab tomorrow, and over the next couple of weeks if the patient does not do well patient will need referral to meet with cardiothoracic surgery if indeed the patient and family choose to do that

## 2025-05-21 NOTE — ASSESSMENT & PLAN NOTE
Urine culture from admission revealed an ESBL Klebsiella pneumoniae UTI  Patient is completely asymptomatic, has had no fevers, and does not have a leukocytosis  Will start IV ertapenem

## 2025-05-21 NOTE — ASSESSMENT & PLAN NOTE
Lab Results   Component Value Date    EGFR 23 05/21/2025    EGFR 25 05/20/2025    EGFR 21 05/19/2025    CREATININE 1.90 (H) 05/21/2025    CREATININE 1.81 (H) 05/20/2025    CREATININE 2.04 (H) 05/19/2025     Baseline creatinine unknown  Appreciate nephrology recommendations

## 2025-05-22 VITALS
HEART RATE: 89 BPM | HEIGHT: 60 IN | SYSTOLIC BLOOD PRESSURE: 115 MMHG | DIASTOLIC BLOOD PRESSURE: 67 MMHG | RESPIRATION RATE: 18 BRPM | WEIGHT: 207.45 LBS | BODY MASS INDEX: 40.73 KG/M2 | OXYGEN SATURATION: 94 % | TEMPERATURE: 98.4 F

## 2025-05-22 LAB
ANION GAP SERPL CALCULATED.3IONS-SCNC: 7 MMOL/L (ref 4–13)
BASOPHILS # BLD AUTO: 0.03 THOUSANDS/ÂΜL (ref 0–0.1)
BASOPHILS NFR BLD AUTO: 1 % (ref 0–1)
BUN SERPL-MCNC: 47 MG/DL (ref 5–25)
CALCIUM SERPL-MCNC: 8.9 MG/DL (ref 8.4–10.2)
CHLORIDE SERPL-SCNC: 104 MMOL/L (ref 96–108)
CO2 SERPL-SCNC: 31 MMOL/L (ref 21–32)
CREAT SERPL-MCNC: 1.57 MG/DL (ref 0.6–1.3)
EOSINOPHIL # BLD AUTO: 0.1 THOUSAND/ÂΜL (ref 0–0.61)
EOSINOPHIL NFR BLD AUTO: 3 % (ref 0–6)
ERYTHROCYTE [DISTWIDTH] IN BLOOD BY AUTOMATED COUNT: 19.9 % (ref 11.6–15.1)
GFR SERPL CREATININE-BSD FRML MDRD: 30 ML/MIN/1.73SQ M
GLUCOSE SERPL-MCNC: 84 MG/DL (ref 65–140)
HCT VFR BLD AUTO: 30.5 % (ref 34.8–46.1)
HGB BLD-MCNC: 9.3 G/DL (ref 11.5–15.4)
IMM GRANULOCYTES # BLD AUTO: 0.01 THOUSAND/UL (ref 0–0.2)
IMM GRANULOCYTES NFR BLD AUTO: 0 % (ref 0–2)
LYMPHOCYTES # BLD AUTO: 1.01 THOUSANDS/ÂΜL (ref 0.6–4.47)
LYMPHOCYTES NFR BLD AUTO: 33 % (ref 14–44)
MCH RBC QN AUTO: 29.1 PG (ref 26.8–34.3)
MCHC RBC AUTO-ENTMCNC: 30.5 G/DL (ref 31.4–37.4)
MCV RBC AUTO: 95 FL (ref 82–98)
MONOCYTES # BLD AUTO: 0.33 THOUSAND/ÂΜL (ref 0.17–1.22)
MONOCYTES NFR BLD AUTO: 11 % (ref 4–12)
NEUTROPHILS # BLD AUTO: 1.62 THOUSANDS/ÂΜL (ref 1.85–7.62)
NEUTS SEG NFR BLD AUTO: 52 % (ref 43–75)
NRBC BLD AUTO-RTO: 0 /100 WBCS
PLATELET # BLD AUTO: 196 THOUSANDS/UL (ref 149–390)
PMV BLD AUTO: 10.7 FL (ref 8.9–12.7)
POTASSIUM SERPL-SCNC: 3.8 MMOL/L (ref 3.5–5.3)
RBC # BLD AUTO: 3.2 MILLION/UL (ref 3.81–5.12)
SODIUM SERPL-SCNC: 142 MMOL/L (ref 135–147)
WBC # BLD AUTO: 3.1 THOUSAND/UL (ref 4.31–10.16)

## 2025-05-22 PROCEDURE — 92526 ORAL FUNCTION THERAPY: CPT

## 2025-05-22 PROCEDURE — 99232 SBSQ HOSP IP/OBS MODERATE 35: CPT | Performed by: INTERNAL MEDICINE

## 2025-05-22 PROCEDURE — 99232 SBSQ HOSP IP/OBS MODERATE 35: CPT | Performed by: PHYSICIAN ASSISTANT

## 2025-05-22 PROCEDURE — 97116 GAIT TRAINING THERAPY: CPT

## 2025-05-22 PROCEDURE — 85025 COMPLETE CBC W/AUTO DIFF WBC: CPT | Performed by: HOSPITALIST

## 2025-05-22 PROCEDURE — 97110 THERAPEUTIC EXERCISES: CPT

## 2025-05-22 PROCEDURE — 97535 SELF CARE MNGMENT TRAINING: CPT

## 2025-05-22 PROCEDURE — 80048 BASIC METABOLIC PNL TOTAL CA: CPT | Performed by: HOSPITALIST

## 2025-05-22 PROCEDURE — 99239 HOSP IP/OBS DSCHRG MGMT >30: CPT | Performed by: HOSPITALIST

## 2025-05-22 RX ORDER — FUROSEMIDE 40 MG/1
40 TABLET ORAL 2 TIMES DAILY
Start: 2025-05-22 | End: 2025-06-21

## 2025-05-22 RX ADMIN — METOPROLOL SUCCINATE 25 MG: 25 TABLET, EXTENDED RELEASE ORAL at 09:05

## 2025-05-22 RX ADMIN — NYSTATIN: 100000 POWDER TOPICAL at 09:05

## 2025-05-22 RX ADMIN — FUROSEMIDE 40 MG: 40 TABLET ORAL at 09:05

## 2025-05-22 RX ADMIN — ALLOPURINOL 100 MG: 100 TABLET ORAL at 09:05

## 2025-05-22 RX ADMIN — PANTOPRAZOLE SODIUM 40 MG: 40 TABLET, DELAYED RELEASE ORAL at 05:01

## 2025-05-22 NOTE — ASSESSMENT & PLAN NOTE
Patient is currently on Invanz 500 mg every 24 hours, continue care according to our medical colleagues.

## 2025-05-22 NOTE — ASSESSMENT & PLAN NOTE
Wt Readings from Last 3 Encounters:   05/22/25 94.1 kg (207 lb 7.3 oz)   EF 70%, 4/14/17. Repeat ECHO pending.  Continue management per cardiology/hospitalist    Patient compensated at this time, transition to furosemide 40 mg by mouth once daily.  Continue with current care.

## 2025-05-22 NOTE — PLAN OF CARE
Problem: PAIN - ADULT  Goal: Verbalizes/displays adequate comfort level or baseline comfort level  Description: Interventions:  - Encourage patient to monitor pain and request assistance  - Assess pain using appropriate pain scale  - Administer analgesics as ordered based on type and severity of pain and evaluate response  - Implement non-pharmacological measures as appropriate and evaluate response  - Consider cultural and social influences on pain and pain management  - Notify physician/advanced practitioner if interventions unsuccessful or patient reports new pain  - Educate patient/family on pain management process including their role and importance of  reporting pain   - Provide non-pharmacologic/complimentary pain relief interventions  Outcome: Progressing     Problem: INFECTION - ADULT  Goal: Absence or prevention of progression during hospitalization  Description: INTERVENTIONS:  - Assess and monitor for signs and symptoms of infection  - Monitor lab/diagnostic results  - Monitor all insertion sites, i.e. indwelling lines, tubes, and drains  - Monitor endotracheal if appropriate and nasal secretions for changes in amount and color  - [place] appropriate cooling/warming therapies per order  - Administer medications as ordered  - Instruct and encourage patient and family to use good hand hygiene technique  Outcome: Progressing  Goal: Absence of fever/infection during neutropenic period  Description: INTERVENTIONS:  - Monitor WBC  - Perform strict hand hygiene  - Limit to healthy visitors only  - No plants, dried, fresh or silk flowers with asif in patient room  Outcome: Progressing  Goal: Infection Control Precautions  Outcome: Progressing     Problem: SAFETY ADULT  Goal: Patient will remain free of falls  Description: INTERVENTIONS:  - Educate patient/family on patient safety including physical limitations  - Instruct patient to call for assistance with activity   - Consider consulting OT/PT to assist  with strengthening/mobility based on AM PAC & JH-HLM score  - Consult OT/PT to assist with strengthening/mobility   - Keep Call bell within reach  - Keep bed low and locked with side rails adjusted as appropriate  - Keep care items and personal belongings within reach  - Initiate and maintain comfort rounds  - Make Fall Risk Sign visible to staff  - Offer Toileting every 2 Hours, in advance of need  - Initiate/Maintain bed/chair alarm  - Obtain necessary fall risk management equipment  - Apply yellow socks and bracelet for high fall risk patients  - Consider moving patient to room near nurses station  Outcome: Progressing  Goal: Maintain or return to baseline ADL function  Description: INTERVENTIONS:  -  Assess patient's ability to carry out ADLs; assess patient's baseline for ADL function and identify physical deficits which impact ability to perform ADLs (bathing, care of mouth/teeth, toileting, grooming, dressing, etc.)  - Assess/evaluate cause of self-care deficits   - Assess range of motion  - Assess patient's mobility; develop plan if impaired  - Assess patient's need for assistive devices and provide as appropriate  - Encourage maximum independence but intervene and supervise when necessary  - Involve family in performance of ADLs  - Assess for home care needs following discharge   - Consider OT consult to assist with ADL evaluation and planning for discharge  - Provide patient education as appropriate  - Monitor functional capacity and physical performance, use of AM PAC & JH-HLM   - Monitor gait, balance and fatigue with ambulation    Outcome: Progressing  Goal: Maintains/Returns to pre admission functional level  Description: INTERVENTIONS:  - Perform AM-PAC 6 Click Basic Mobility/ Daily Activity assessment daily.  - Set and communicate daily mobility goal to care team and patient/family/caregiver.   - Collaborate with rehabilitation services on mobility goals if consulted  - Perform Range of Motion 3  times a day.  - Reposition patient every 2 hours.  - Dangle patient 3 times a day  - Stand patient 3 times a day  - Out of bed to chair 3 times a day   - Out of bed for meals 3 times a day  - Out of bed for toileting  - Record patient progress and toleration of activity level   Outcome: Progressing     Problem: DISCHARGE PLANNING  Goal: Discharge to home or other facility with appropriate resources  Description: INTERVENTIONS:  - Identify barriers to discharge w/patient and caregiver  - Arrange for needed discharge resources and transportation as appropriate  - Identify discharge learning needs (meds, wound care, etc.)  - Refer to Case Management Department for coordinating discharge planning if the patient needs post-hospital services based on physician/advanced practitioner order or complex needs related to functional status, cognitive ability, or social support system  Outcome: Progressing     Problem: Knowledge Deficit  Goal: Patient/family/caregiver demonstrates understanding of disease process, treatment plan, medications, and discharge instructions  Description: Complete learning assessment and assess knowledge base.  Interventions:  - Provide teaching at level of understanding  - Provide teaching via preferred learning methods  Outcome: Progressing     Problem: Prexisting or High Potential for Compromised Skin Integrity  Goal: Skin integrity is maintained or improved  Description: INTERVENTIONS:  - Identify patients at risk for skin breakdown  - Assess and monitor skin integrity including under and around medical devices   - Assess and monitor nutrition and hydration status  - Monitor labs  - Assess for incontinence   - Turn and reposition patient  - Assist with mobility/ambulation  - Relieve pressure over roselyn prominences   - Avoid friction and shearing  - Provide appropriate hygiene as needed including keeping skin clean and dry  - Evaluate need for skin moisturizer/barrier cream  - Collaborate with  interdisciplinary team  - Patient/family teaching  - Consider wound care consult    Assess:  - Review Marcelo scale daily  - Clean and moisturize skin every   - Inspect skin when repositioning, toileting, and assisting with ADLS  - Assess under medical devices such as  every   - Assess extremities for adequate circulation and sensation     Bed Management:  - Have minimal linens on bed & keep smooth, unwrinkled  - Change linens as needed when moist or perspiring  - Avoid sitting or lying in one position for more than  hours while in bed?Keep HOB at degrees   - Toileting:  - Offer bedside commode  - Assess for incontinence every   - Use incontinent care products after each incontinent episode such as     Activity:  - Mobilize patient  times a day  - Encourage activity and walks on unit  - Encourage or provide ROM exercises   - Turn and reposition patient every  Hours  - Use appropriate equipment to lift or move patient in bed  - Instruct/ Assist with weight shifting every  when out of bed in chair  - Consider limitation of chair time  hour intervals    Skin Care:  - Avoid use of baby powder, tape, friction and shearing, hot water or constrictive clothing  - Relieve pressure over bony prominences using   - Do not massage red bony areas    Next Steps:  - Teach patient strategies to minimize risks such as   - Consider consults to  interdisciplinary teams such as  Outcome: Progressing     Problem: Decreased Cardiac Output  Goal: Cardiac output adequate for individual needs  Description: INTERVENTIONS: Monitor for signs and symptoms of decreased cardiac output   - Monitor for dyspnea with exertion and at rest  - Monitor for orthopnea  - Monitor for signs of tachycardia. Place patient on telemetry monitoring.  - Assess patient for jugular vein distention  - Assess patient for lower extremity edema and poor peripheral perfusion   - Auscultate lung sound for Fine bibasilar crackles   - Monitor for cardiac arrythmias    - Administer beta blockers, antiarrhythmic, and blood pressure medications as ordered    Outcome: Progressing     Problem: Impaired Gas Exchange  Goal: Optimize oxygenation and ensure adequate ventilation  Description: INTERVENTIONS: Monitor for signs and symptoms of respiratory distress                - Elevate HOB or use high fowlers to promote lung expansion                - Administer oxygen as ordered to maintain adequate oxygenation                - Encourage use of IS to promote lung expansion and prevent PN                - Monitor ABGs to assess oxygenation status                - Monitor blood oxygen level to maintain adequate oxygenation                - Encourage cough and deep breathing exercises to promote lung expansion                - Monitor patient's mental status for increased confusion    Outcome: Progressing     Problem: Excess Fluid Volume  Goal: Patient is able to achieve and maintain homeostasis  Description: INTERVENTIONS: Monitor for sign and symptoms of fluid overload  - Evaluate LE edema every shift  - Elevate LE to prevent dependent edema  - Apply EZEKIEL stockings as ordered   - Monitor ankle circumference daily  - Assess for jugular vein distention  - Evaluate provider orders for the CHF diuretic algorithm. Administer diuretics as ordered  - Weigh the patient daily at 0600 and report a weight gain of five pounds or more   - Strict intake and output  - Monitor fluid intake and adhere to fluid restrictions  - Assess lung sounds every shift and as needed  - Monitor vital signs and lab values (CBC, chem, BUN, BNP)  - Measure and document urine output    Outcome: Progressing     Problem: Activity Intolerance  Goal: Patient is able to perform activities within their limitations  Description: INTERVENTIONS:                       -   Alternate periods of activity with periods of rest                 -   Patients is able to maintain normal vitals heart rhythm during activity                 -    Gradually increase activity and exercise as patient can tolerate                 -   Monitor blood pressure and heart before and after exercise                  -   Monitor blood oxygen saturation during activity and apply oxygen as needed    Outcome: Progressing     Problem: Knowledge Deficit  Goal: Patient is able to verbalize understanding of Heart Failure after education  Description: INTERVENTIONS:  - Educate the patient and family on signs and symptoms of HF  - Provide the patient with HF education and HF zone tool  - Educate on the importance of daily weight in the AM and reporting a weight gain               of 3 or more pounds to their primary care physician  - Monitor for SOB  - Maintain and sodium and fluid restriction  - Educate the patient on the importance of medications such as: diuretics, betablockers,               antiarrhythmics and their purpose, dose, route, side effects and labs               if they are needed    Outcome: Progressing     Problem: Potential for Falls  Goal: Patient will remain free of falls  Description: INTERVENTIONS:  - Educate patient/family on patient safety including physical limitations  - Instruct patient to call for assistance with activity   - Consider consulting OT/PT to assist with strengthening/mobility based on AM PAC & JH-HLM score  - Consult OT/PT to assist with strengthening/mobility   - Keep Call bell within reach  - Keep bed low and locked with side rails adjusted as appropriate  - Keep care items and personal belongings within reach  - Initiate and maintain comfort rounds  - Make Fall Risk Sign visible to staff  - Offer Toileting every 2 Hours, in advance of need  - Initiate/Maintain bed/chair alarm  - Obtain necessary fall risk management equipment  - Apply yellow socks and bracelet for high fall risk patients  - Consider moving patient to room near nurses station  Outcome: Progressing

## 2025-05-22 NOTE — NURSING NOTE
Report called to [place]. All belongings with daughter who is following transporter to facility. Patient changed and put into personal clothes and fresh pull up prior to leaving.

## 2025-05-22 NOTE — DISCHARGE SUMMARY
Discharge Summary - Hospitalist   Name: Riri Cuellar 85 y.o. female I MRN: [id number]  Unit/Bed#: -01 I Date of Admission: 5/17/2025   Date of Service: 5/22/2025 I Hospital Day: 5     Assessment & Plan  Acute on chronic diastolic congestive heart failure (HCC)  Patient presents with shortness of breath and significant lower extremity edema  Arrival   Appreciate cardiology input  Patient diuresed almost 2 L of fluid on this admission  2D echocardiogram results reviewed-EF of 55%, see the full report for additional details  Patient has been deemed medically stable for discharge  In the discharge setting will bontinue goal-directed medical therapy with the following medications:-  #1.  Xarelto 15 mg p.o. daily  #2.  Toprol-XL 25 mg p.o. daily  #3.  Atorvastatin 40 mg p.o. daily  #4.  Lasix 40 mg p.o. twice daily  Elevated troponins-  Troponin trend as follows-53, 43  Patient declined any further invasive testing, treatment, and/or workup such as a cardiac catheterization  Most likely non myocardial injury related elevated troponins in the setting of having CHF, and chronic kidney disease  Outpatient follow-up with cardiology, and PCP  Anemia  Hemoglobin was 6.6 at time of arrival which might have been a contributing factor to her initial complaint of shortness of breath   Patient has been transfused 2 units of packed red blood cells on this admission   Follow-up hemoglobins have remained stable  Suspect that this is multifactorial in the setting of having anemia of chronic kidney disease, rheumatoid arthritis and iron deficiency anemia  Patient denies any visible blood loss, denies hematemesis, hemoptysis, and/or blood loss per rectum hemoglobin 6.6 on presentation   IV Venofer was not given in the setting of significant volume overload IV Venofer was not given to the patient in the setting of having significant volume overload  Patient will need continued periodic outpatient CBC monitoring  Hemoglobin  is stable at time of discharge  Stage 4 chronic kidney disease (HCC)  Lab Results   Component Value Date    EGFR 30 05/22/2025    EGFR 23 05/21/2025    EGFR 25 05/20/2025    CREATININE 1.57 (H) 05/22/2025    CREATININE 1.90 (H) 05/21/2025    CREATININE 1.81 (H) 05/20/2025   Unclear baseline as patient has not had BMP since 2017  Renal function stable at time of discharge  Will need continued periodic outpatient BMP monitoring via her PCP  Dysphagia  Patient was seen by speech therapy  and had a video swallow evaluation-recommendations included thin liquids and a dysphagia level 2 diet mechanical soft  GI was consulted on 5/20/2025  Status post an EGD on 5/21/2025-patient was found to have a Large type III hiatal hernia   Patient has been tolerating her diet well with minimal to no coughing and no dysphagia  DC to rehab today discharge diet will be a dysphagia level 2 mechanical soft with thin liquids  Outpatient follow-up with GI, and/or thoracic surgery in the future if needed  Paroxysmal atrial fibrillation (HCC)  Continue Toprol-XL for rate control in the postdischarge setting  Continue Xarelto for anticoagulation purposes in the postdischarge setting  Gastroesophageal reflux disease without esophagitis  Continue Protonix in the postdischarge setting  Rheumatoid arthritis (HCC)  History of RA on methotrexate  Outpatient follow-up with Rheumatology  Acute cystitis without hematuria  Urine culture from admission revealed an ESBL Klebsiella pneumoniae UTI  Patient is completely asymptomatic, has had no fevers, and does not have a leukocytosis  Patient otherwise asymptomatic, patient was given a dose of IV ertapenem, no further antibiotics needed     Medical Problems       Resolved Problems  Date Reviewed: 5/17/2025   None       Discharging Physician / Practitioner: Elaine Lucas MD  PCP: Femi Mcpherson DO  Admission Date:   Admission Orders (From admission, onward)       Ordered        05/17/25 5453   Inpatient Admission  Once                          Discharge Date: 05/22/25    Next Steps for Physician/AP Assuming Care:  Please ensure routine posthospital discharge blood work is ordered and monitored  Please ensure that the patient is continuing to tolerate a diet well -if not patient may need a referral to visit with thoracic surgery in the setting of having a large hiatal hernia discovered on EGD testing  Please ensure that the patient routinely follows up with cardiology    Test Results Pending at Discharge (will require follow up):  None    Medication Changes for Discharge & Rationale:   New prescription Lasix 40 mg twice a day this was prescribed for congestive heart failure purposes  See after visit summary for reconciled discharge medications provided to patient and/or family.     Consultations During Hospital Stay:  Cardiology  Gastroenterology  Nephrology    Procedures Performed:   5/21/2025 EGD by GI-large hiatal hernia-see the full report for additional details    Significant Findings / Test Results:   Chest x-ray-mild pulmonary vascular congestion  Ultrasound kidney and bladder-no hydronephrosis  Video barium swallow    Incidental Findings:   None    Hospital Course:   Riri Cuellar is a 85 y.o. female patient who originally presented to the hospital on 5/17/2025 due to shortness of breath and lower extremity edema.  Please refer to the initial history and physical examination completed by Dr. Juan A Fowler for the initial presenting features and complaints.  In brief, the patient is an 85-year-old female who was admitted with the complaints as outlined above.  She was diagnosed with an acute exacerbation of diastolic dysfunction CHF.    CHF-patient was seen in conjunction with cardiology.  She was diuresed with 50 mg of IV Lasix twice daily.  Echocardiogram testing was performed which revealed an EF of 55% and diastolic dysfunction.  She was ultimately cleared by cardiology on 5/22/2025 for  discharge on 40 mg of Lasix p.o. twice daily.    Dysphagia-patient initially was seen by speech therapy.  A video barium swallow was concerning for the possibility of aspiration.  An endoscopy was performed after GI got involved, patient was found to have a large hiatal hernia.  Patient tolerated a modified diet well and was discharged on a mechanical soft dysphagia level 2 with thin liquids to a rehab facility.  If she has further issues in the future she can follow-up with GI and/or thoracic surgery.    Acute on chronic kidney injury-most of this was related to intravascular depletion caused by diuretics.  Patient was seen in conjunction with nephrology.    Patient was seen by PT and OT they recommended postacute rehab, case management were able to set the patient up to go to [facility] on 5/22/2025.    A new prescription was provided for Lasix.  Please refer to the assessment/plan portion of this discharge summary as outlined above for additional details regarding her stay here in the hospital.      Please see above list of diagnoses and related plan for additional information.     Discharge Day Visit / Exam:   Subjective: Patient seen, doing okay, feels well, no new complaints  Vitals: Blood Pressure: 115/67 (05/22/25 0835)  Pulse: 89 (05/22/25 0835)  Temperature: 98.4 °F (36.9 °C) (05/22/25 0835)  Temp Source: Temporal (05/21/25 1449)  Respirations: 18 (05/21/25 2342)  Height: 5' (152.4 cm) (05/19/25 0646)  Weight - Scale: 94.1 kg (207 lb 7.3 oz) (05/22/25 0558)  SpO2: 94 % (05/22/25 0845)  Physical Exam  Constitutional:       General: She is not in acute distress.     Appearance: Normal appearance. She is normal weight. She is not ill-appearing.   HENT:      Head: Normocephalic and atraumatic.      Nose: Nose normal.      Mouth/Throat:      Mouth: Mucous membranes are moist.     Eyes:      Extraocular Movements: Extraocular movements intact.      Pupils: Pupils are equal, round, and reactive to light.        Cardiovascular:      Rate and Rhythm: Normal rate and regular rhythm.      Pulses: Normal pulses.      Heart sounds: Normal heart sounds. No murmur heard.     No friction rub. No gallop.   Pulmonary:      Effort: Pulmonary effort is normal. No respiratory distress.      Breath sounds: Normal breath sounds. No wheezing, rhonchi or rales.   Abdominal:      General: There is no distension.      Palpations: Abdomen is soft. There is no mass.      Tenderness: There is no abdominal tenderness.      Hernia: No hernia is present.     Musculoskeletal:         General: No swelling or tenderness. Normal range of motion.      Cervical back: Normal range of motion and neck supple. No rigidity.      Right lower leg: No edema.      Left lower leg: No edema.     Skin:     General: Skin is warm.      Capillary Refill: Capillary refill takes less than 2 seconds.      Findings: No erythema or rash.     Neurological:      General: No focal deficit present.      Mental Status: She is alert and oriented to person, place, and time. Mental status is at baseline.      Cranial Nerves: No cranial nerve deficit.      Motor: No weakness.     Psychiatric:         Mood and Affect: Mood normal.         Behavior: Behavior normal.          Discussion with Family: Updated  (daughter) at bedside.    Discharge instructions/Information to patient and family:   See after visit summary for information provided to patient and family.      Provisions for Follow-Up Care:  See after visit summary for information related to follow-up care and any pertinent home health orders.      Mobility at time of Discharge:   Basic Mobility Inpatient Raw Score: 16  JH-HLM Goal: 5: Stand one or more mins  JH-HLM Achieved: 5: Stand (1 or more minutes)  HLM Goal NOT achieved. Continue to encourage mobility in post discharge setting.     Disposition:   Other Skilled Nursing Facility at [place]    Planned Readmission: None    Administrative Statements    Discharge Statement:  I have spent a total time of 35 minutes in caring for this patient on the day of the visit/encounter. >30 minutes of time was spent on: Diagnostic results, Prognosis, Risks and benefits of tx options, Instructions for management, Patient and family education, Importance of tx compliance, Risk factor reductions, Impressions, Counseling / Coordination of care, Documenting in the medical record, Reviewing / ordering tests, medicine, procedures  , and Communicating with other healthcare professionals .    **Please Note: This note may have been constructed using a voice recognition system**

## 2025-05-22 NOTE — ASSESSMENT & PLAN NOTE
Wt Readings from Last 3 Encounters:   05/22/25 94.1 kg (207 lb 7.3 oz)     EF preserved, BNP minimally elevated  Patient declined invasive or aggressive treatments or strategies including ischemic evaluation but is agreeable to medical therapy  Responded well to PO diuretic    Lasix 40 mg BID    Ok to discharge to home on this dose   Weight stable net loss -1.942  Continue metoprolol succinate 25 mg daily  Low-sodium diet, fluid restriction, daily weights on a standing scale if able, I/O

## 2025-05-22 NOTE — ASSESSMENT & PLAN NOTE
Patient presents with shortness of breath and significant lower extremity edema  Arrival   Appreciate cardiology input  Patient diuresed almost 2 L of fluid on this admission  2D echocardiogram results reviewed-EF of 55%, see the full report for additional details  Patient has been deemed medically stable for discharge  In the discharge setting will bontinue goal-directed medical therapy with the following medications:-  #1.  Xarelto 15 mg p.o. daily  #2.  Toprol-XL 25 mg p.o. daily  #3.  Atorvastatin 40 mg p.o. daily  #4.  Lasix 40 mg p.o. twice daily  Elevated troponins-  Troponin trend as follows-53, 43  Patient declined any further invasive testing, treatment, and/or workup such as a cardiac catheterization  Most likely non myocardial injury related elevated troponins in the setting of having CHF, and chronic kidney disease  Outpatient follow-up with cardiology, and PCP

## 2025-05-22 NOTE — ASSESSMENT & PLAN NOTE
Patient is status post 2 into packed red blood cells during this admission, hemoglobin has been stable.

## 2025-05-22 NOTE — ASSESSMENT & PLAN NOTE
Lab Results   Component Value Date    EGFR 30 05/22/2025    EGFR 23 05/21/2025    EGFR 25 05/20/2025    CREATININE 1.57 (H) 05/22/2025    CREATININE 1.90 (H) 05/21/2025    CREATININE 1.81 (H) 05/20/2025   Baseline creatinine unclear.  Last known labs from 2017 with creatinine 1.2-1.5.  No previous nephrology records available in Care Everywhere.    Etiology presumed cardiorenal pathophysiology, obesity related FSGS and age-related nephron loss.  Creatinine 1.68 mg/dL, GFR 27 mL/min.  Creatinine previously 1.95 on 5/17 and 1.31 on 5/1.  UA trace intact blood, 1+ leukocytes, 30-50 WBC, innumerable bacteria.    Will check urine creatinine, urine sodium and renal ultrasound.  Chest x-ray-wet read noting bilateral pulmonary vascular congestion, 5/17.  Volume status examines hypervolemic  Continue to avoid nephrotoxins and NSAIDs.  Avoid hypotension.  Receiving Lasix 50 mg twice daily IV.    5/19  Creatinine slightly elevated from 1.7-2.0, suspected to be on the basis of dehydration from being n.p.o. and receiving diuretics.  Status post 1 L of D5W bolus.  Continue with current management and supportive care measures    5/20  The creatinine is slightly better today after a liter of fluids today, sodium level is also improved. She was encouraged to stay well hydrated as she is undergoing an aggressive diuresis for appropriate reasons as noted in the principal problem.     May 22   creatinine continues to improve, continue supportive care at this time, patient is doing well with current dosing of furosemide, and appears octavio euvolemic at this time.

## 2025-05-22 NOTE — ASSESSMENT & PLAN NOTE
Urine culture from admission revealed an ESBL Klebsiella pneumoniae UTI  Patient is completely asymptomatic, has had no fevers, and does not have a leukocytosis  Patient otherwise asymptomatic, patient was given a dose of IV ertapenem, no further antibiotics needed

## 2025-05-22 NOTE — ASSESSMENT & PLAN NOTE
Continue Toprol-XL for rate control in the postdischarge setting  Continue Xarelto for anticoagulation purposes in the postdischarge setting

## 2025-05-22 NOTE — ASSESSMENT & PLAN NOTE
Hemoglobin was 6.6 at time of arrival which might have been a contributing factor to her initial complaint of shortness of breath   Patient has been transfused 2 units of packed red blood cells on this admission   Follow-up hemoglobins have remained stable  Suspect that this is multifactorial in the setting of having anemia of chronic kidney disease, rheumatoid arthritis and iron deficiency anemia  Patient denies any visible blood loss, denies hematemesis, hemoptysis, and/or blood loss per rectum hemoglobin 6.6 on presentation   IV Venofer was not given in the setting of significant volume overload IV Venofer was not given to the patient in the setting of having significant volume overload  Patient will need continued periodic outpatient CBC monitoring  Hemoglobin is stable at time of discharge

## 2025-05-22 NOTE — ASSESSMENT & PLAN NOTE
Status post endoscopy, no specific issues noted, continue with dysphagia diet.  Patient claims to be doing better with the coughing associated with eating.

## 2025-05-22 NOTE — ASSESSMENT & PLAN NOTE
Lab Results   Component Value Date    EGFR 30 05/22/2025    EGFR 23 05/21/2025    EGFR 25 05/20/2025    CREATININE 1.57 (H) 05/22/2025    CREATININE 1.90 (H) 05/21/2025    CREATININE 1.81 (H) 05/20/2025     Baseline creatinine unknown  Appreciate nephrology recommendations  Creatinine improving

## 2025-05-22 NOTE — CASE MANAGEMENT
Case Management Discharge Planning Note    Patient name Riri Cuellar  Location /-01 MRN [id number]  : 1940 Date 2025       Current Admission Date: 2025  Current Admission Diagnosis:Acute on chronic diastolic congestive heart failure (HCC)   Patient Active Problem List    Diagnosis Date Noted    Acute cystitis without hematuria 2025    Dysphagia 2025    Acute on chronic diastolic congestive heart failure (HCC) 2025    Paroxysmal atrial fibrillation (HCC) 2025    Gastroesophageal reflux disease without esophagitis 2025    Rheumatoid arthritis (HCC) 2025    Anemia 2025    Stage 4 chronic kidney disease (HCC) 2025      LOS (days): 4  Geometric Mean LOS (GMLOS) (days): 3  Days to GMLOS:-1.1     OBJECTIVE:  Risk of Unplanned Readmission Score: 19.47         Current admission status: Inpatient   Preferred Pharmacy:   [facility] [address] [address]  Phone: [phone] Fax: [phone]    Primary Care Provider: Femi Mpcherson DO    Primary Insurance: [employer]  Secondary Insurance:     DISCHARGE DETAILS:    Discharge planning discussed with:: patient & daughter called cm at 10:05 am  Freedom of Choice: Yes  Comments - Freedom of Choice: pt accepted to [facility]- family & pt's choice - authorization was received- pt not stable to be d/c today - pt needs an EGD  CM contacted family/caregiver?: Yes             Contacts  Patient Contacts: Norma Sandhu  Relationship to Patient:: Family (daughter)  Contact Method: Phone  Phone Number: [phone]  Reason/Outcome: Discharge Planning              Other Referral/Resources/Interventions Provided:  Interventions: Short Term Rehab  Referral Comments: pt accepteed to [facility] - no covid is needed- report needs to be called  and avs needs to be faxed- S transport  set up for 15:30pm on 2024- pt needs to see speech  tomorrow - if pt is not able to be d/c tomorrow transport will need to be cx    Would you like to participate in our [facility] service program?  : No - Declined    Treatment Team Recommendation: Short Term Rehab ([facility] auth - 15:30pm JOHNY Mcconnell pt on oxygen no auth needed)

## 2025-05-22 NOTE — PROGRESS NOTES
Progress Note - Cardiology   Name: Riri Cuellar 85 y.o. female I MRN: [id number]  Unit/Bed#: -01 I Date of Admission: 5/17/2025   Date of Service: 5/22/2025 I Hospital Day: 5    Assessment & Plan  Acute on chronic diastolic congestive heart failure (HCC)  Wt Readings from Last 3 Encounters:   05/22/25 94.1 kg (207 lb 7.3 oz)     EF preserved, BNP minimally elevated  Patient declined invasive or aggressive treatments or strategies including ischemic evaluation but is agreeable to medical therapy  Responded well to PO diuretic    Lasix 40 mg BID    Ok to discharge to home on this dose   Weight stable net loss -1.942  Continue metoprolol succinate 25 mg daily  Low-sodium diet, fluid restriction, daily weights on a standing scale if able, I/O  Paroxysmal atrial fibrillation (HCC)  Rate controlled  Continue metoprolol 25 mg daily  On Xarelto for stroke risk reduction   Hemoglobin stable, patient denies bleeding  Anemia  Presenting hemoglobin 6.6  S/p transfusion  Hgb stable, 9.3 today  Stage 4 chronic kidney disease (HCC)  Lab Results   Component Value Date    EGFR 30 05/22/2025    EGFR 23 05/21/2025    EGFR 25 05/20/2025    CREATININE 1.57 (H) 05/22/2025    CREATININE 1.90 (H) 05/21/2025    CREATININE 1.81 (H) 05/20/2025     Baseline creatinine unknown  Appreciate nephrology recommendations  Creatinine improving   Summary Comments:  Responded well to po diuretic. Continue Lasix 40 mg BID and okay to discharge to home on this dose.  Continue low-sodium fluid restricted diet and daily weights.    Outpatient Cardiologist: We will make arrangements.    Subjective:  Patient seen and examined at the bedside.  No events overnight.  Feeling better. No chest pain dyspnea, or palpitations.  Trace edema still present.    Objective:   Vitals: Blood pressure 115/67, pulse 89, temperature 98.4 °F (36.9 °C), resp. rate 18, height 5' (1.524 m), weight 94.1 kg (207 lb 7.3 oz), SpO2 92%.,   Orthostatic Blood Pressures       Flowsheet Row Most Recent Value   Blood Pressure 115/67 filed at 05/22/2025 0835   Patient Position - Orthostatic VS Lying filed at 05/21/2025 0773            Telemetry/ECG/Cardiac Testing:   No tele     Echo 5/19/2025: EF 55% mild DEVONTE, mild-moderate AI moderate AAS peak velocity 3.01 m/s moderate MR mild to moderate TR    Physical Exam:  Physical Exam  Vitals and nursing note reviewed.   Constitutional:       Appearance: She is well-developed.   HENT:      Head: Normocephalic and atraumatic.      Mouth/Throat:      Mouth: Mucous membranes are moist.     Eyes:      General: No scleral icterus.     Conjunctiva/sclera: Conjunctivae normal.     Neck:      Vascular: No JVD.      Trachea: No tracheal deviation.     Cardiovascular:      Rate and Rhythm: Normal rate and regular rhythm.      Pulses: Intact distal pulses.      Heart sounds: S1 normal and S2 normal. Murmur heard.      Harsh systolic murmur is present with a grade of 3/6 at the upper right sternal border radiating to the neck.      No friction rub. No gallop.   Pulmonary:      Effort: Pulmonary effort is normal. No respiratory distress.      Breath sounds: Normal breath sounds. No wheezing or rales.      Comments: CBS  Chest:      Chest wall: No tenderness.   Abdominal:      General: Bowel sounds are normal. There is no distension.      Palpations: Abdomen is soft.      Tenderness: There is no abdominal tenderness.      Comments: Aorta not palpable      Musculoskeletal:         General: No tenderness. Normal range of motion.      Cervical back: Normal range of motion and neck supple.      Right lower leg: Edema (Trace) present.      Left lower leg: Edema (Trace) present.     Skin:     General: Skin is warm and dry.      Coloration: Skin is not pale.      Findings: No erythema or rash.     Neurological:      General: No focal deficit present.      Mental Status: She is alert and oriented to person, place, and time.     Psychiatric:         Mood and Affect:  Mood normal.         Behavior: Behavior normal.         Judgment: Judgment normal.         Medications:  Current Medications[1]    Labs & Results:  Results from last 7 days   Lab Units 05/17/25  1551   BNP pg/mL 233*     Results from last 7 days   Lab Units 05/22/25  0501   WBC Thousand/uL 3.10*   HEMOGLOBIN g/dL 9.3*   HEMATOCRIT % 30.5*   PLATELETS Thousands/uL 196         Results from last 7 days   Lab Units 05/22/25  0501 05/20/25  0625 05/19/25  0526   POTASSIUM mmol/L 3.8   < > 4.0   CHLORIDE mmol/L 104   < > 112*   CO2 mmol/L 31   < > 25   BUN mg/dL 47*   < > 48*   CREATININE mg/dL 1.57*   < > 2.04*   MAGNESIUM mg/dL  --   --  2.0    < > = values in this interval not displayed.     Results from last 7 days   Lab Units 05/17/25  1551   INR  1.65*                  [1]   Current Facility-Administered Medications:     acetaminophen (TYLENOL) tablet 650 mg, 650 mg, Oral, Q6H PRN, Elaine Lucas MD, 650 mg at 05/20/25 1719    allopurinol (ZYLOPRIM) tablet 100 mg, 100 mg, Oral, Daily, Juan A Shirley PA-C, 100 mg at 05/20/25 0957    atorvastatin (LIPITOR) tablet 40 mg, 40 mg, Oral, Daily With Dinner, Juan A Fowler DO, 40 mg at 05/21/25 1613    ertapenem (INVanz) 500 mg in sodium chloride 0.9 % 50 mL IVPB, 500 mg, Intravenous, Q24H, Elaine Lucas MD, Last Rate: 100 mL/hr at 05/21/25 1732, 500 mg at 05/21/25 1732    furosemide (LASIX) tablet 40 mg, 40 mg, Oral, BID (diuretic), Kay Shah PA-C, 40 mg at 05/21/25 1613    HYDROcodone Bit-Homatrop MBr (HYCODAN) oral syrup 5 mL, 5 mL, Oral, Q4H PRN, Surendra Ramirez PA-C, 5 mL at 05/21/25 1613    metoprolol succinate (TOPROL-XL) 24 hr tablet 25 mg, 25 mg, Oral, Daily, Juan A Fowler DO, 25 mg at 05/19/25 0933    nystatin (MYCOSTATIN) powder, , Topical, BID, Juan A Fowler DO, Given at 05/21/25 1734    ondansetron (ZOFRAN) injection 4 mg, 4 mg, Intravenous, Q6H PRN, Juan A Fowler DO    pantoprazole (PROTONIX) EC tablet 40 mg, 40 mg, Oral, Early  Morning, Juan A Fowler DO, 40 mg at 05/22/25 0501    rivaroxaban (XARELTO) tablet 15 mg, 15 mg, Oral, Daily With Dinner, Juan A Fowler DO, 15 mg at 05/21/25 1617

## 2025-05-22 NOTE — PHYSICAL THERAPY NOTE
"   PT Treatment Note    NAME:  Riri Cuellar  DATE: 05/22/25    AGE:   85 y.o.  Mrn:   [id number]  ADMIT DX:  Shortness of breath [R06.02]  Cough [R05.9]  Anemia [D64.9]  CHF exacerbation (HCC) [I50.9]  Acute on chronic diastolic congestive heart failure (HCC) [I50.33]  Stage 4 chronic kidney disease (HCC) [N18.4]  Performed at least 2 patient identifiers during session: Name and Epic photo       05/22/25 0828   PT Last Visit   PT Visit Date 05/22/25   Note Type   Note Type Treatment   Pain Assessment   Pain Assessment Tool 0-10   Pain Score No Pain   Patient's Stated Pain Goal No pain   Restrictions/Precautions   Weight Bearing Precautions Per Order No   Other Precautions Chair Alarm;Bed Alarm;Fall Risk;Pain;Contact/isolation   General   Chart Reviewed Yes   Family/Caregiver Present No   Cognition   Overall Cognitive Status WFL   Arousal/Participation Alert;Responsive;Cooperative   Attention Attends with cues to redirect   Orientation Level Oriented X4   Memory Within functional limits   Following Commands Follows all commands and directions without difficulty   Subjective   Subjective \"I am feeling better slowly\"   Bed Mobility   Supine to Sit 4  Minimal assistance   Additional items Assist x 1;Increased time required;Bedrails;LE management;Verbal cues   Sit to Supine   (dnt pt in recliner to [facility] session)   Additional Comments vc for bed rail utilization   Transfers   Sit to Stand 4  Minimal assistance   Additional items Assist x 1;Increased time required;Bedrails;Verbal cues   Stand to Sit 4  Minimal assistance   Additional items Assist x 1;Increased time required;Armrests;Verbal cues   Stand pivot 3  Moderate assistance   Additional items Assist x 1;Increased time required;Verbal cues   Additional Comments rw used,pt needed increased cuing for LE advancement, O2 satsstayed above 90 throughout transfer   Balance   Static Sitting Fair +   Dynamic Sitting Fair   Static Standing Fair -   Dynamic Standing Fair - "   Ambulatory Fair -   Activity Tolerance   Activity Tolerance Patient limited by fatigue;Patient limited by pain   Medical Staff Made Aware cm made aware   Nurse Made Aware bria pierce   Exercises   Glute Sets Sitting;15 reps;Bilateral  (2 x 15)   Hip Abduction Sitting;15 reps;Bilateral  (2 x 15)   Hip Adduction Sitting;15 reps;Bilateral  (2 x 15)   Knee AROM Long Arc Quad Sitting;15 reps;Bilateral  (2x15)   Ankle Pumps Sitting;15 reps;Bilateral  (2x15)   Squat   (2 sit to stands performed to allow for proper pericare)   Marching Sitting;15 reps;Bilateral  (2 x 15)   Assessment   Prognosis Fair   Problem List Decreased strength;Decreased mobility;Impaired balance;Decreased endurance   Assessment Pt seen for PT treatment session this date with interventions consisting of gait training to normalize gait pattern to decrease fall risk and therapeutic exercise to improve strength to improve functional mobility. Pt agreeable to PT treatment session upon arrival, pt found supine in bed, in no apparent distress, A&O x 4, and responsive. Since previous session, pt has made fair progress as evidenced by increased activity tolerance  Barriers during this session include pain and fatigue.  Pt continues to be functioning below baseline level, and remains limited 2* factors listed above and including decreased strength, impaired activity tolerance, impaired  balance, poor safety awareness, and decreased endurance.  Pt prognosis for achieving goals is fair, pending pt progress with hospitalization/medical status improvements, and indicated by motivated to participate in therapy, ability to follow cues, and supportive family. PT will continue to see pt during current hospitalization in order to address the deficits listed above and provide interventions consistent w/ POC in effort to achieve goals. Current goals and POC remain appropriate, pt continues to have rehab potential  Upon conclusion pt seated OOB in recliner. The patient's  AM-PAC Basic Mobility Inpatient Short Form Raw Score is 16.  A Raw score of less than or equal to 16 suggests the patient may benefit from discharge to post-acute rehabilitation services. Based on patient presentations and impairments, pt would most appropriately benefit from Level 2 resource intensity upon discharge.  Please also refer to the recommendation of the Physical Therapist for safe discharge planning. RN verbalized pt appropriate for PT session.   Goals   Patient Goals to feel better   LTG Expiration Date 05/29/25   PT Treatment Day 2   Plan   Treatment/Interventions Functional transfer training;LE strengthening/ROM;Elevations;Therapeutic exercise;Endurance training;Gait training   Progress Progressing toward goals   PT Frequency 3-5x/wk   Discharge Recommendation   Rehab Resource Intensity Level, PT II (Moderate Resource Intensity)   Equipment Recommended Walker   [facility] Basic Mobility Inpatient   Turning in Flat Bed Without Bedrails 3   Lying on Back to Sitting on Edge of Flat Bed Without Bedrails 3   Moving Bed to Chair 3   Standing Up From Chair Using Arms 3   Walk in Room 3   Climb 3-5 Stairs With Railing 1   Basic Mobility Inpatient Raw Score 16   Basic Mobility Standardized Score 38.32   [facility] Highest Level Of Mobility   -HLM Goal 5: Stand one or more mins   -HLM Achieved 5: Stand (1 or more minutes)       Time In: 0820  Time Out: 0900  Total Treatment Minutes: 40    Yovani Gonzales, PT

## 2025-05-22 NOTE — SPEECH THERAPY NOTE
Speech Language/Pathology    Speech/Language Pathology Progress Note    Patient Name: Riri Cuellar  Today's Date: 5/22/2025     Problem List  Principal Problem:    Acute on chronic diastolic congestive heart failure (HCC)  Active Problems:    Paroxysmal atrial fibrillation (HCC)    Gastroesophageal reflux disease without esophagitis    Rheumatoid arthritis (HCC)    Anemia    Stage 4 chronic kidney disease (HCC)    Dysphagia    Acute cystitis without hematuria       Past Medical History  Past Medical History[1]     Past Surgical History  Past Surgical History[2]      Subjective:  Pt was positioned upright and alert.   Objective:  Pt was seen for f/u dysphagia therapy at breakfast. Upon ST arrival pt laying flat attempted to feed self. Repositioned. Verbal education provided on importance of sitting upright as aspiration and reflux precaution. Pt fed herself mech soft sausage, banana (cut into bite sized pieces) and thin liquids via cup sip. Mastication was functional for current diet level. Bolus control, formation, and transfer were WNL. Swallows appeared prompt. Independently alternated liquids with solids. No overt s/s of aspiration. Completed 100% of tray. ST stepped out of room following meal completion did note brief dry successive cough.  EGD 05/21/2025  IMPRESSION:  The upper third of the esophagus and middle third of the esophagus appeared normal.  Large type III hiatal hernia  The body of the stomach, greater curve of the stomach, lesser curve of the stomach, incisura and antrum appeared normal.  The duodenum appeared normal.  RECOMMENDATION:  Return to floor; modified diet per speech recs as tolerated  Aspiration precautions   If dysphagia persists and limits PO, consider barium esophagram/upper GI to delineate hernia anatomy and referral to thoracic surgery to discuss hernia repair     Assessment:  Demonstrated functional oral and pharyngeal stages of swallow with current diet level. No overt s/s of  aspiration. Completed 100% of tray. Brief cough following meal completion.   Plan/Recommendations:  Recommend continue dysphagia 2 mech soft and thin liquids.   May benefit from smaller meals more frequently throughout the day  Ensure good oral care  Aspiration precautions and reflux precautions   ST continue to f/u as indicated     Time In: 8:03  Time Out: 8:16            [1] No past medical history on file.  [2] No past surgical history on file.

## 2025-05-22 NOTE — OCCUPATIONAL THERAPY NOTE
"   05/22/25 0930   OT Last Visit   OT Visit Date 05/22/25   Note Type   Note Type Treatment   Pain Assessment   Pain Assessment Tool 0-10   Pain Score No Pain   Restrictions/Precautions   Other Precautions Contact/isolation;Fall Risk;Chair Alarm;Bed Alarm   Lifestyle   Intrinsic Gratification likes to read and do \"stitching\"   ADL   Where Assessed Other (Comment)  (in room recliner)   Grooming Comments patient applied upper dentures during session   UB Bathing Assistance 4  Minimal Assistance   UB Bathing Deficit Setup;Supervision/safety;Increased time to complete;Right arm  (and Left axilla)   LB Bathing Assistance 2  Maximal Assistance   LB Bathing Deficit Setup;Supervision/safety;Increased time to complete;Perineal area;Buttocks;Right lower leg including foot;Left lower leg including foot   LB Bathing Comments patient indicates pain with leg movement   UB Dressing Comments *moderate assist. with hospital gown change   LB Dressing Comments Dep. for doff/don socks this session   Transfers   Additional Comments **please see PT note for mobility status   Therapeutic Excerise-Strength   UE Strength Yes   Right Upper Extremity- Strength   R Shoulder Horizontal ABduction;Other (Comment)  (horizontal adduction (NOTE: patient is familiar with flex/ext and pro/re-traction exercise from home therapy experience))   R Elbow Elbow flexion;Elbow extension  (in forward and reverse;  Also: supin/pron-ation)   R Weight/Reps/Sets 10 reps shoulders ; With 2 pound weight > 10 reps 2 sets elbow ext/flex, And 20 reps supin/pron   RUE Strength Comment *patient reports use of dowel for bilateral [facility]   Left Upper Extremity-Strength   L Elbow Elbow flexion;Elbow extension  (in forward and reverse; Also: supin/pron-ation)   L Weights/Reps/Sets 10 reps elbows done Without weight   LUE Strength Comment educated in table top exercise for shoulder exercise   Coordination   Gross Motor *has impaired L shoulder " movement   Subjective   Subjective patient was very appreciative of today's treatment   Cognition   Overall Cognitive Status WFL   Arousal/Participation Alert;Responsive;Cooperative   Attention Attends with cues to redirect   Orientation Level Oriented X4   Memory Within functional limits   Following Commands Follows all commands and directions without difficulty   Activity Tolerance   Activity Tolerance Patient limited by fatigue;Patient limited by pain   Medical Staff Made Aware nurse Scarlett aware of session   Assessment   Assessment Patient participated in Skilled OT session this date with interventions consisting of ADL re training with the use of correct body mechnaics, Energy Conservation techniques, therapeutic exercise to: increase functional use of BUEs, increase BUE muscle strength , increase postural control, increase trunk control, and increase OOB/ sitting tolerance (including self-AA exercise appropriate for LUE movement, specifically table top exercise.)  Patient agreeable to OT treatment session, upon arrival patient was found seated OOB to Recliner.  Patient requiring verbal cues for correct technique, verbal cues for pacing thru activity steps, one step directives, and frequent rest periods, and self-care assistance as noted in flow sheet/AM-PAC. Patient continues to be functioning below baseline level, occupational performance remains limited secondary to factors listed above and increased risk for falls and injury.  Patient to benefit from continued Occupational Therapy treatment while in the hospital to address deficits as defined above and maximize level of functional independence with ADLs and functional mobility.   Plan   Treatment Interventions ADL retraining;UE strengthening/ROM;Patient/family training;Compensatory technique education;Energy conservation;Activityengagement   Goal Expiration Date 05/29/25   OT Treatment Day 1   Discharge Recommendation   Rehab Resource Intensity Level, OT  II (Moderate Resource Intensity)   Additional Comments 2 The patient's raw score on the AM-PAC Daily Activity Inpatient Short Form is 16. A raw score of less than 19 suggests the patient may benefit from discharge to post-acute rehabilitation services. Please refer to the recommendation of the Occupational Therapist for safe discharge planning.   AM-PAC Daily Activity Inpatient   Lower Body Dressing 2   Bathing 2   Toileting 2   Upper Body Dressing 3   Grooming 3   Eating 4   Daily Activity Raw Score 16   Daily Activity Standardized Score (Calc for Raw Score >=11) 35.96   AM-PAC Applied Cognition Inpatient   Following a Speech/Presentation 3   Understanding Ordinary Conversation 4   Taking Medications 3   Remembering Where Things Are Placed or Put Away 2   Remembering List of 4-5 Errands 2   Taking Care of Complicated Tasks 2   Applied Cognition Raw Score 16   Applied Cognition Standardized Score 35.03     *patient reported shaking of legs when standing/walking and feeling that she will fall.   SIMIN Adams

## 2025-05-22 NOTE — PLAN OF CARE
Problem: PHYSICAL THERAPY ADULT  Goal: Performs mobility at highest level of function for planned discharge setting.  See evaluation for individualized goals.  Description: Treatment/Interventions: Functional transfer training, LE strengthening/ROM, Elevations, Therapeutic exercise, Endurance training, Gait training  Equipment Recommended: Walker       See flowsheet documentation for full assessment, interventions and recommendations.  Outcome: Progressing  Note: Prognosis: Fair  Problem List: Decreased strength, Decreased mobility, Impaired balance, Decreased endurance  Assessment: Pt seen for PT treatment session this date with interventions consisting of gait training to normalize gait pattern to decrease fall risk and therapeutic exercise to improve strength to improve functional mobility. Pt agreeable to PT treatment session upon arrival, pt found supine in bed, in no apparent distress, A&O x 4, and responsive. Since previous session, pt has made fair progress as evidenced by increased activity tolerance  Barriers during this session include pain and fatigue.  Pt continues to be functioning below baseline level, and remains limited 2* factors listed above and including decreased strength, impaired activity tolerance, impaired  balance, poor safety awareness, and decreased endurance.  Pt prognosis for achieving goals is fair, pending pt progress with hospitalization/medical status improvements, and indicated by motivated to participate in therapy, ability to follow cues, and supportive family. PT will continue to see pt during current hospitalization in order to address the deficits listed above and provide interventions consistent w/ POC in effort to achieve goals. Current goals and POC remain appropriate, pt continues to have rehab potential  Upon conclusion pt seated OOB in recliner. The patient's AM-PAC Basic Mobility Inpatient Short Form Raw Score is 16.  A Raw score of less than or equal to 16 suggests  the patient may benefit from discharge to post-acute rehabilitation services. Based on patient presentations and impairments, pt would most appropriately benefit from Level 2 resource intensity upon discharge.  Please also refer to the recommendation of the Physical Therapist for safe discharge planning. RN verbalized pt appropriate for PT session.        Rehab Resource Intensity Level, PT: II (Moderate Resource Intensity)    See flowsheet documentation for full assessment.

## 2025-05-22 NOTE — QUICK NOTE
GASTROENTEROLOGY QUICK NOTE:     We performed EGD yesterday 5/21 which showed a large type III hiatal hernia.  Per speech therapy, patient demonstrated completed 100% of her tray this morning with improved swallow function.  She is to remain on dysphagia 2 diet and should continue aspiration precautions.  Recommend outpatient referral to thoracic surgery to discuss hernia repair if within patient's goals of care.  Of course this can be deferred if patient's swallow function continues to improve and will not affect her quality of life given her advanced age.  No further GI needs at this time.    Thank you for involving us in the care of Riri Cuellar. No further GI intervention warranted at this time. GI will sign off. Please reach out with any questions or concerns.      DO ZULEYKA Bains Gastroenterology Fellow, PGY-4

## 2025-05-22 NOTE — DISCHARGE INSTR - AVS FIRST PAGE
Dear Riri Cuellar,     It was our pleasure to care for you here at [facility].  It is our hope that we were always able to exceed the expected standards for your care during your stay.  You were hospitalized due to anemia, congestive heart failure, and coughing.  You were cared for on the medical/surgical floor by Elaine Lucas MD with the [facility] who covers for your primary care physician (PCP), Femi Mcpherson DO, while you were hospitalized.    You were additionally seen by the following physicians:-    #1.  [facility]   #2.  [facility]   #3.  [facility]     if you have any questions or concerns related to this hospitalization, you may contact us at .  For follow up as well as any medication refills, we recommend that you follow up with your primary care physician.  A registered nurse will reach out to you by phone within a few days after your discharge to answer any additional questions that you may have after going home.  However, at this time we provide for you here, the most important instructions / recommendations at discharge:     Notable Medication Adjustments -   New prescription Lasix 40 mg take 1 tablet twice a day  Okay to continue all other preadmission medications at the preadmission doses without change  Testing Required after Discharge -   To be further determined in the near future in the outpatient setting by your PCP  Important follow up information -   Please follow-up with the providers as outlined in this discharge packet  Other Instructions -   Please be seated upright for all meals  Dietary instructions moving forward dysphagia level 2 mechanical soft diet with thin liquids  Please review this entire after visit summary as additional general instructions including medication list, appointments, activity, diet, any pertinent wound  care, and other additional recommendations from your care team that may be provided for you.      Sincerely,     Elaine Lucas MD

## 2025-05-22 NOTE — ANESTHESIA POSTPROCEDURE EVALUATION
Post-Op Assessment Note            No anethesia notable event occurred.    Staff: Anesthesiologist           Last Filed PACU Vitals:  Vitals Value Taken Time   Temp 97.6 °F (36.4 °C) 05/21/25 14:49   Pulse 89 05/21/25 15:14   /51 05/21/25 15:14   Resp 20 05/21/25 15:14   SpO2 95 % 05/21/25 15:14       Modified Shahana:     Vitals Value Taken Time   Activity 2 05/21/25 15:14   Respiration 2 05/21/25 15:14   Circulation 2 05/21/25 15:14   Consciousness 2 05/21/25 15:14   Oxygen Saturation 2 05/21/25 15:14     Modified Shahana Score: 10

## 2025-05-22 NOTE — ASSESSMENT & PLAN NOTE
Patient was seen by speech therapy  and had a video swallow evaluation-recommendations included thin liquids and a dysphagia level 2 diet mechanical soft  GI was consulted on 5/20/2025  Status post an EGD on 5/21/2025-patient was found to have a Large type III hiatal hernia   Patient has been tolerating her diet well with minimal to no coughing and no dysphagia  DC to rehab today discharge diet will be a dysphagia level 2 mechanical soft with thin liquids  Outpatient follow-up with GI, and/or thoracic surgery in the future if needed

## 2025-05-22 NOTE — ASSESSMENT & PLAN NOTE
Lab Results   Component Value Date    EGFR 30 05/22/2025    EGFR 23 05/21/2025    EGFR 25 05/20/2025    CREATININE 1.57 (H) 05/22/2025    CREATININE 1.90 (H) 05/21/2025    CREATININE 1.81 (H) 05/20/2025   Unclear baseline as patient has not had BMP since 2017  Renal function stable at time of discharge  Will need continued periodic outpatient BMP monitoring via her PCP

## 2025-05-22 NOTE — CASE MANAGEMENT
Case Management Discharge Planning Note    Patient name Riri Cuellar  Location /-01 MRN [id number]  : 1940 Date 2025       Current Admission Date: 2025  Current Admission Diagnosis:Acute on chronic diastolic congestive heart failure (HCC)   Patient Active Problem List    Diagnosis Date Noted    Acute cystitis without hematuria 2025    Dysphagia 2025    Acute on chronic diastolic congestive heart failure (HCC) 2025    Paroxysmal atrial fibrillation (HCC) 2025    Gastroesophageal reflux disease without esophagitis 2025    Rheumatoid arthritis (HCC) 2025    Anemia 2025    Stage 4 chronic kidney disease (HCC) 2025      LOS (days): 5  Geometric Mean LOS (GMLOS) (days): 3  Days to GMLOS:-1.9     OBJECTIVE:  Risk of Unplanned Readmission Score: 18.79         Current admission status: Inpatient   Preferred Pharmacy:   [facility] - [address] [address]  Phone: [phone] Fax: [phone]    Primary Care Provider: Femi Mcpherson DO    Primary Insurance: [employer]  Secondary Insurance:     DISCHARGE DETAILS:    Discharge planning discussed with:: patietn & daughter at [facility] bedside  Freedom of Choice: Yes  Comments - Freedom of Choice: pt accepted to [facility]- family & pt's choice- auth received-  pt & family are in agreement with the d/c & d/c plan  CM contacted family/caregiver?: Yes  Were Treatment Team discharge recommendations reviewed with patient/caregiver?: Yes  Did patient/caregiver verbalize understanding of patient care needs?: Yes  Were patient/caregiver advised of the risks associated with not following Treatment Team discharge recommendations?: Yes    Contacts  Patient Contacts: Norma Sandhu  Relationship to Patient:: Family  Contact Method: In Person  Reason/Outcome: Discharge Planning    Requested Home Health Care         Is the patient interested in  HHC at discharge?: No    DME Referral Provided  Referral made for DME?: No    Other Referral/Resources/Interventions Provided:  Interventions: Short Term Rehab  Referral Comments: pt accepted to [facility]-  auth received- pt is not on oxygen today - [facility] daughter was made aware that there is a fee- rn was given the report & fax #  no covid test needed    Would you like to participate in our [facility] service program?  : No - Declined    Treatment Team Recommendation: Short Term Rehab ([facility] auth received - 15:30 pm w/c annamarie Mcconnell)  Discharge Destination Plan:: Short Term Rehab ([facility] auth received- 15;30 pm w/c annamarie Mcconnell)  Transport at Discharge : Wheelchair van     Number/Name of Dispatcher: [phone]  Transported by (Company and Unit #): [employer]  ETA of Transport (Date): 05/22/25  ETA of Transport (Time): 1530                 IMM Given to:: Family (daughter)  Family notified:: daughter was at the bedside

## 2025-05-22 NOTE — PLAN OF CARE
Problem: OCCUPATIONAL THERAPY ADULT  Goal: Performs self-care activities at highest level of function for planned discharge setting.  See evaluation for individualized goals.  Description: Treatment Interventions: ADL retraining, Functional transfer training, UE strengthening/ROM, Endurance training, Patient/family training, Compensatory technique education, Energy conservation          See flowsheet documentation for full assessment, interventions and recommendations.   Outcome: Progressing, gradually   Note: Limitation: Decreased ADL status, Decreased UE strength, Decreased Safe judgement during ADL, Decreased endurance, Decreased self-care trans, Decreased high-level ADLs  Prognosis: Good  Assessment: Patient participated in Skilled OT session this date with interventions consisting of ADL re training with the use of correct body mechnaics, Energy Conservation techniques, therapeutic exercise to: increase functional use of BUEs, increase BUE muscle strength , increase postural control, increase trunk control, and increase OOB/ sitting tolerance (including self-AA exercise appropriate for LUE movement, specifically table top exercise.)  Patient agreeable to OT treatment session, upon arrival patient was found seated OOB to Recliner.  Patient requiring verbal cues for correct technique, verbal cues for pacing thru activity steps, one step directives, and frequent rest periods, and self-care assistance as noted in flow sheet/AM-PAC. Patient continues to be functioning below baseline level, occupational performance remains limited secondary to factors listed above and increased risk for falls and injury.  Patient to benefit from continued Occupational Therapy treatment while in the hospital to address deficits as defined above and maximize level of functional independence with ADLs and functional mobility.     Rehab Resource Intensity Level, OT: II (Moderate Resource Intensity)        SIMIN Adams

## 2025-05-22 NOTE — PROGRESS NOTES
Progress Note - Nephrology   Name: Riri Cuellar 85 y.o. female I MRN: [id number]  Unit/Bed#: -01 I Date of Admission: 5/17/2025   Date of Service: 5/22/2025 I Hospital Day: 5     Assessment & Plan  Stage 4 chronic kidney disease (HCC)  Lab Results   Component Value Date    EGFR 30 05/22/2025    EGFR 23 05/21/2025    EGFR 25 05/20/2025    CREATININE 1.57 (H) 05/22/2025    CREATININE 1.90 (H) 05/21/2025    CREATININE 1.81 (H) 05/20/2025   Baseline creatinine unclear.  Last known labs from 2017 with creatinine 1.2-1.5.  No previous nephrology records available in Care Everywhere.    Etiology presumed cardiorenal pathophysiology, obesity related FSGS and age-related nephron loss.  Creatinine 1.68 mg/dL, GFR 27 mL/min.  Creatinine previously 1.95 on 5/17 and 1.31 on 5/1.  UA trace intact blood, 1+ leukocytes, 30-50 WBC, innumerable bacteria.    Will check urine creatinine, urine sodium and renal ultrasound.  Chest x-ray-wet read noting bilateral pulmonary vascular congestion, 5/17.  Volume status examines hypervolemic  Continue to avoid nephrotoxins and NSAIDs.  Avoid hypotension.  Receiving Lasix 50 mg twice daily IV.    5/19  Creatinine slightly elevated from 1.7-2.0, suspected to be on the basis of dehydration from being n.p.o. and receiving diuretics.  Status post 1 L of D5W bolus.  Continue with current management and supportive care measures    5/20  The creatinine is slightly better today after a liter of fluids today, sodium level is also improved. She was encouraged to stay well hydrated as she is undergoing an aggressive diuresis for appropriate reasons as noted in the principal problem.     May 22   creatinine continues to improve, continue supportive care at this time, patient is doing well with current dosing of furosemide, and appears octavio euvolemic at this time.    Acute on chronic diastolic congestive heart failure (HCC)  Wt Readings from Last 3 Encounters:   05/22/25 94.1 kg (207 lb 7.3 oz)   EF  70%, 4/14/17. Repeat ECHO pending.  Continue management per cardiology/hospitalist    Patient compensated at this time, transition to furosemide 40 mg by mouth once daily.  Continue with current care.  Paroxysmal atrial fibrillation (HCC)  Patient is on metoprolol for rate control, continue with Xarelto for anticoagulation.  Gastroesophageal reflux disease without esophagitis    Rheumatoid arthritis (HCC)    Anemia  Patient is status post 2 into packed red blood cells during this admission, hemoglobin has been stable.  Dysphagia  Status post endoscopy, no specific issues noted, continue with dysphagia diet.  Patient claims to be doing better with the coughing associated with eating.  Acute cystitis without hematuria  Patient is currently on Invanz 500 mg every 24 hours, continue care according to our medical colleagues.    Case discussed with hospitalist.  No changes from the renal standpoint, continue with furosemide, optimize care.    Follow up reason for today's visit: Chronic kidney disease    Acute on chronic diastolic congestive heart failure (HCC)    Problem List[1]      Subjective:   No acute issues at this time, patient is feeling significantly proved over the last 24 hours.    Objective:     Vitals: Blood pressure 115/67, pulse 89, temperature 98.4 °F (36.9 °C), resp. rate 18, height 5' (1.524 m), weight 94.1 kg (207 lb 7.3 oz), SpO2 92%.,Body mass index is 40.52 kg/m².    Weight (last 2 days)       Date/Time Weight    05/22/25 0558 94.1 (207.45)    05/22/25 0502 94.1 (207.45)    05/21/25 0547 95.8 (211.2)    05/21/25 0507 95.8 (211.2)    05/20/25 0600 97.9 (215.83)            No intake or output data in the 24 hours ending 05/22/25 0941  I/O last 3 completed shifts:  In: -   Out: 500 [Urine:500]         Physical Exam: /67   Pulse 89   Temp 98.4 °F (36.9 °C)   Resp 18   Ht 5' (1.524 m)   Wt 94.1 kg (207 lb 7.3 oz)   SpO2 92%   BMI 40.52 kg/m²     General Appearance:    Alert, cooperative, no  distress, appears stated age   Head:    Normocephalic, without obvious abnormality, atraumatic   Eyes:    Conjunctiva/corneas clear   Ears:    Normal external ears   Nose:   Nares normal, septum midline, mucosa normal, no drainage    or sinus tenderness   Throat:   Lips, mucosa, and tongue normal; teeth and gums normal   Neck:   Supple   Back:     Symmetric, no curvature, ROM normal, no CVA tenderness   Lungs:     Clear to auscultation bilaterally, respirations unlabored   Chest wall:    No tenderness or deformity   Heart:    Regular rate and rhythm, S1 and S2 normal, no murmur, rub   or gallop   Abdomen:     Soft, non-tender, bowel sounds active   Extremities:   Extremities normal, atraumatic, no cyanosis or edema   Skin:   Skin color, texture, turgor normal, no rashes or lesions   Lymph nodes:   Cervical normal   Neurologic:   CNII-XII intact            Lab, Imaging and other studies: I have personally reviewed pertinent labs.  CBC:   Lab Results   Component Value Date    WBC 3.10 (L) 05/22/2025    HGB 9.3 (L) 05/22/2025    HCT 30.5 (L) 05/22/2025    MCV 95 05/22/2025     05/22/2025    RBC 3.20 (L) 05/22/2025    MCH 29.1 05/22/2025    MCHC 30.5 (L) 05/22/2025    RDW 19.9 (H) 05/22/2025    MPV 10.7 05/22/2025    NRBC 0 05/22/2025     CMP:   Lab Results   Component Value Date    K 3.8 05/22/2025     05/22/2025    CO2 31 05/22/2025    BUN 47 (H) 05/22/2025    CREATININE 1.57 (H) 05/22/2025    CALCIUM 8.9 05/22/2025    EGFR 30 05/22/2025       .  Results from last 7 days   Lab Units 05/22/25  0501 05/21/25  0442 05/20/25  0625 05/18/25  0512 05/17/25  1551   POTASSIUM mmol/L 3.8 3.9 3.9   < > 4.0   CHLORIDE mmol/L 104 106 105   < > 109*   CO2 mmol/L 31 25 29   < > 21   BUN mg/dL 47* 51* 44*   < > 47*   CREATININE mg/dL 1.57* 1.90* 1.81*   < > 1.95*   CALCIUM mg/dL 8.9 8.7 9.0   < > 8.6   ALK PHOS U/L  --   --   --   --  63   ALT U/L  --   --   --   --  20   AST U/L  --   --   --   --  29    < > = values  "in this interval not displayed.         Phosphorus: No results found for: \"PHOS\"  Magnesium: No results found for: \"MG\"  Urinalysis: No results found for: \"COLORU\", \"CLARITYU\", \"SPECGRAV\", \"PHUR\", \"LEUKOCYTESUR\", \"NITRITE\", \"PROTEINUA\", \"GLUCOSEU\", \"KETONESU\", \"BILIRUBINUR\", \"BLOODU\"  Ionized Calcium: No results found for: \"CAION\"  Coagulation: No results found for: \"PT\", \"INR\", \"APTT\"  Troponin: No results found for: \"TROPONINI\"  ABG: No results found for: \"PHART\", \"SXI8DCG\", \"PO2ART\", \"EGK2ZHI\", \"L6LMLYPD\", \"BEART\", \"SOURCE\"  Radiology review:     IMAGING  Procedure: EGD  Result Date: 5/21/2025  Narrative: Table formatting from the original result was not included. [facility] [address] [place] [id number] DATE OF SERVICE: 5/21/25 PHYSICIAN(S): Attending: Yolanda Castillo MD Fellow: Jessica Sagastume DO INDICATION: Oral phase dysphagia POST-OP DIAGNOSIS: See the impression below. PREPROCEDURE: Informed consent was obtained for the procedure, including sedation.  Risks of perforation, hemorrhage, adverse drug reaction and aspiration were discussed. The patient was placed in the left lateral decubitus position. Patient was explained about the risks and benefits of the procedure. Risks including but not limited to bleeding, infection, and perforation were explained in detail. Also explained about less than 100% sensitivity with the exam and other alternatives. PROCEDURE: EGD DETAILS OF PROCEDURE: Patient was taken to the procedure room where a time out was performed to confirm correct patient and correct procedure. The patient underwent monitored anesthesia care, which was administered by an anesthesia professional. The patient's blood pressure, heart rate, level of consciousness, respirations, oxygen, ECG and ETCO2 were monitored throughout the procedure. The scope was introduced through the mouth and advanced to the second part of the duodenum. Retroflexion was performed in the " fundus. The patient experienced no blood loss. The procedure was not difficult. The patient tolerated the procedure well. There were no apparent adverse events. ANESTHESIA INFORMATION: ASA: III Anesthesia Type: Anesthesia type not filed in the log. MEDICATIONS: No administrations occurring from 1406 to 1450 on 05/21/25 FINDINGS: The upper third of the esophagus and middle third of the esophagus appeared normal. Large herniation of both GE junction and stomach (type III hiatal hernia) - GE junction 37 cm from the incisors, diaphragmatic impression 45 cm from the incisors, confirmed by retroflexion The body of the stomach, greater curve of the stomach, lesser curve of the stomach, incisura and antrum appeared normal. The duodenum appeared normal. SPECIMENS: * No specimens in log *     Impression: The upper third of the esophagus and middle third of the esophagus appeared normal. Large type III hiatal hernia The body of the stomach, greater curve of the stomach, lesser curve of the stomach, incisura and antrum appeared normal. The duodenum appeared normal. RECOMMENDATION: Return to floor; modified diet per speech recs as tolerated Aspiration precautions If dysphagia persists and limits PO, consider barium esophagram/upper GI to delineate hernia anatomy and referral to thoracic surgery to discuss hernia repair    Yolanda Castillo MD     Procedure: FL barium swallow video w speech  Result Date: 5/19/2025  Narrative: A video barium swallow study was performed by the Department of Speech Pathology. Please refer to the report for the official interpretation. The images are stored for archival purposes only. Study images were not formally reviewed by the Radiology Department.    Procedure: US kidney and bladder  Result Date: 5/19/2025  Narrative: RENAL ULTRASOUND INDICATION: NATANAEL, rule out obstruction. COMPARISON: None TECHNIQUE: Ultrasound of the retroperitoneum was performed with a curvilinear transducer utilizing volumetric  sweeps and still imaging techniques. FINDINGS: KIDNEYS: Right kidney: 8.9 x 3.8 x 4.5 cm. Volume 78.8 mL Left kidney: Suboptimal visualization. Right kidney Normal echogenicity and contour. No mass is identified. Multiple simple cysts, 2.5 cm and smaller. No hydronephrosis. No shadowing calculi. No perinephric fluid collections. Left kidney Suboptimal visualization 2.8 cm simple cyst. No hydronephrosis. URETERS: Nonvisualized. BLADDER: Normally distended. No focal thickening or mass lesions. Minimal layering debris. Bilateral ureteral jets detected.     Impression: No hydronephrosis. Bilateral ureteral jets are visualized. Minimal bladder debris. Correlate with UA. Workstation performed: [id number]         Current Facility-Administered Medications:     acetaminophen (TYLENOL) tablet 650 mg, Q6H PRN    allopurinol (ZYLOPRIM) tablet 100 mg, Daily    atorvastatin (LIPITOR) tablet 40 mg, Daily With Dinner    ertapenem (INVanz) 500 mg in sodium chloride 0.9 % 50 mL IVPB, Q24H, Last Rate: 500 mg (05/21/25 1732)    furosemide (LASIX) tablet 40 mg, BID (diuretic)    HYDROcodone Bit-Homatrop MBr (HYCODAN) oral syrup 5 mL, Q4H PRN    metoprolol succinate (TOPROL-XL) 24 hr tablet 25 mg, Daily    nystatin (MYCOSTATIN) powder, BID    ondansetron (ZOFRAN) injection 4 mg, Q6H PRN    pantoprazole (PROTONIX) EC tablet 40 mg, Early Morning    rivaroxaban (XARELTO) tablet 15 mg, Daily With Dinner  Medications Discontinued During This Encounter   Medication Reason    levofloxacin (LEVAQUIN) IVPB (premix in dextrose) 750 mg 150 mL     metoprolol tartrate (LOPRESSOR) tablet 25 mg     furosemide (LASIX) injection 40 mg     atorvastatin (LIPITOR) tablet 40 mg Duplicate order    metoprolol succinate (TOPROL-XL) 24 hr tablet 25 mg Duplicate order    bumetanide (BUMEX) tablet 0.5 mg Duplicate order    rivaroxaban (XARELTO) tablet 15 mg     phenazopyridine (PYRIDIUM) tablet 100 mg     furosemide (LASIX) injection 50 mg     doxycycline hyclate  (VIBRAMYCIN) capsule 100 mg     furosemide (LASIX) injection 50 mg        Eamon Castillo,       This progress note was produced in part using a dictation device which may document imprecise wording from author's original intent.           [1]   Patient Active Problem List  Diagnosis    Acute on chronic diastolic congestive heart failure (HCC)    Paroxysmal atrial fibrillation (HCC)    Gastroesophageal reflux disease without esophagitis    Rheumatoid arthritis (HCC)    Anemia    Stage 4 chronic kidney disease (HCC)    Dysphagia    Acute cystitis without hematuria

## 2025-05-23 ENCOUNTER — TELEPHONE (OUTPATIENT)
Dept: OTHER | Facility: HOSPITAL | Age: 85
End: 2025-05-23

## 2025-05-23 DIAGNOSIS — N18.4 STAGE 4 CHRONIC KIDNEY DISEASE (HCC): Primary | ICD-10-CM

## 2025-05-23 LAB
BACTERIA BLD CULT: NORMAL
BACTERIA BLD CULT: NORMAL

## 2025-06-11 ENCOUNTER — APPOINTMENT (OUTPATIENT)
Dept: CARDIOLOGY | Facility: CLINIC | Age: 85
End: 2025-06-11

## 2025-07-18 NOTE — HISTORY OF PRESENT ILLNESS
Refill Request     CONFIRM preferred pharmacy with the patient.    If Mail Order Rx - Pend for 90 day refill.      Last Seen: Last Seen Department: 7/22/2024  Last Seen by PCP: 7/22/2024    Last Written: 7/22/24 3 refills #90    If no future appointment scheduled:  Review the last OV with PCP and review information for follow-up visit,  Route STAFF MESSAGE with patient name to the  Pool for scheduling with the following information:            -  Timing of next visit           -  Visit type ie Physical, OV, etc           -  Diagnoses/Reason ie. COPD, HTN - Do not use MEDICATION, Follow-up or CHECK UP - Give reason for visit      Next Appointment:   Future Appointments   Date Time Provider Department Center   7/29/2025  2:30 PM Alyssa Tiwari DO [facility]       Message sent to  to schedule appt with patient?  NO      Requested Prescriptions     Pending Prescriptions Disp Refills    sertraline (ZOLOFT) 100 MG tablet [Pharmacy Med Name: SERTRALINE  MG TABLET] 90 tablet 3     Sig: TAKE 1 TABLET BY MOUTH DAILY        [FreeTextEntry1] : \par